# Patient Record
Sex: MALE | Race: WHITE | NOT HISPANIC OR LATINO
[De-identification: names, ages, dates, MRNs, and addresses within clinical notes are randomized per-mention and may not be internally consistent; named-entity substitution may affect disease eponyms.]

---

## 2016-11-08 LAB — GLUCOSE: 220

## 2017-01-11 ENCOUNTER — LAB SERVICES (OUTPATIENT)
Dept: OTHER | Age: 52
End: 2017-01-11

## 2017-01-12 ENCOUNTER — CHARTING TRANS (OUTPATIENT)
Dept: OTHER | Age: 52
End: 2017-01-12

## 2017-01-12 LAB
CREATININE RANDOM URINE: 102 MG/DL
HBA1C MFR BLD: 9.9 % (ref 4.5–5.6)
MICROALBUMIN UR-MCNC: 3.7 MG/DL
MICROALBUMIN/CREAT UR: 36.3 MCG/MG

## 2017-01-15 LAB
TESTOST FREE MFR SERPL: NORMAL
TESTOST FREE SERPL-MCNC: 9.77
TESTOST SERPL-MCNC: 444

## 2017-01-16 ENCOUNTER — APPOINTMENT (OUTPATIENT)
Dept: GENERAL RADIOLOGY | Age: 52
End: 2017-01-16
Attending: EMERGENCY MEDICINE
Payer: MEDICAID

## 2017-01-16 ENCOUNTER — HOSPITAL ENCOUNTER (OUTPATIENT)
Facility: HOSPITAL | Age: 52
Setting detail: OBSERVATION
Discharge: HOME | End: 2017-01-17
Attending: EMERGENCY MEDICINE | Admitting: HOSPITALIST
Payer: MEDICAID

## 2017-01-16 DIAGNOSIS — R73.9 HYPERGLYCEMIA: Primary | ICD-10-CM

## 2017-01-16 DIAGNOSIS — E10.10 TYPE 1 DIABETES MELLITUS WITH KETOACIDOSIS WITHOUT COMA (HCC): ICD-10-CM

## 2017-01-16 LAB
ALBUMIN SERPL-MCNC: 4.2 G/DL (ref 3.5–4.8)
ALP LIVER SERPL-CCNC: 76 U/L (ref 45–117)
ALT SERPL-CCNC: 70 U/L (ref 17–63)
AST SERPL-CCNC: 38 U/L (ref 15–41)
ATRIAL RATE: 113 BPM
BASOPHILS # BLD AUTO: 0.1 X10(3) UL (ref 0–0.1)
BASOPHILS NFR BLD AUTO: 1 %
BILIRUB SERPL-MCNC: 0.6 MG/DL (ref 0.1–2)
BUN BLD-MCNC: 17 MG/DL (ref 8–20)
CALCIUM BLD-MCNC: 9.8 MG/DL (ref 8.3–10.3)
CHLORIDE: 98 MMOL/L (ref 101–111)
CLARITY UR REFRACT.AUTO: CLEAR
CO2: 28 MMOL/L (ref 22–32)
COLOR UR AUTO: YELLOW
CREAT BLD-MCNC: 1.28 MG/DL (ref 0.7–1.3)
EOSINOPHIL # BLD AUTO: 0.05 X10(3) UL (ref 0–0.3)
EOSINOPHIL NFR BLD AUTO: 0.5 %
ERYTHROCYTE [DISTWIDTH] IN BLOOD BY AUTOMATED COUNT: 12.9 % (ref 11.5–16)
EST. AVERAGE GLUCOSE BLD GHB EST-MCNC: 232 MG/DL (ref 68–126)
GLUCOSE BLD-MCNC: 137 MG/DL (ref 65–99)
GLUCOSE BLD-MCNC: 151 MG/DL (ref 65–99)
GLUCOSE BLD-MCNC: 171 MG/DL (ref 65–99)
GLUCOSE BLD-MCNC: 227 MG/DL (ref 65–99)
GLUCOSE BLD-MCNC: 269 MG/DL (ref 65–99)
GLUCOSE BLD-MCNC: 321 MG/DL (ref 65–99)
GLUCOSE BLD-MCNC: 343 MG/DL (ref 70–99)
GLUCOSE UR STRIP.AUTO-MCNC: 500 MG/DL
HBA1C MFR BLD HPLC: 9.7 % (ref ?–5.7)
HCT VFR BLD AUTO: 46.1 % (ref 37–53)
HGB BLD-MCNC: 15.8 G/DL (ref 13–17)
IMMATURE GRANULOCYTE COUNT: 0.03 X10(3) UL (ref 0–1)
IMMATURE GRANULOCYTE RATIO %: 0.3 %
ISTAT BLOOD GAS BASE DEFICIT: -1 MMOL/L
ISTAT BLOOD GAS FIO2: 21 %
ISTAT BLOOD GAS HCO3: 24.2 MEQ/L (ref 22–26)
ISTAT BLOOD GAS O2 SATURATION: 68 % (ref 92–100)
ISTAT BLOOD GAS PCO2: 38 MMHG (ref 35–45)
ISTAT BLOOD GAS PH: 7.41 (ref 7.35–7.45)
ISTAT BLOOD GAS PO2: 35 MMHG (ref 80–105)
ISTAT BLOOD GAS TCO2: 25 MMOL/L (ref 22–32)
ISTAT PATIENT TEMPERATURE: 98.11 DEGREE
KETONES UR STRIP.AUTO-MCNC: 80 MG/DL
LEUKOCYTE ESTERASE UR QL STRIP.AUTO: NEGATIVE
LYMPHOCYTES # BLD AUTO: 1.95 X10(3) UL (ref 0.9–4)
LYMPHOCYTES NFR BLD AUTO: 19.5 %
M PROTEIN MFR SERPL ELPH: 8.1 G/DL (ref 6.1–8.3)
MCH RBC QN AUTO: 31.7 PG (ref 27–33.2)
MCHC RBC AUTO-ENTMCNC: 34.3 G/DL (ref 31–37)
MCV RBC AUTO: 92.4 FL (ref 80–99)
MONOCYTES # BLD AUTO: 0.75 X10(3) UL (ref 0.1–0.6)
MONOCYTES NFR BLD AUTO: 7.5 %
NEUTROPHIL ABS PRELIM: 7.12 X10 (3) UL (ref 1.3–6.7)
NEUTROPHILS # BLD AUTO: 7.12 X10(3) UL (ref 1.3–6.7)
NEUTROPHILS NFR BLD AUTO: 71.2 %
NITRITE UR QL STRIP.AUTO: NEGATIVE
P AXIS: 28 DEGREES
P-R INTERVAL: 138 MS
PH UR STRIP.AUTO: 5 [PH] (ref 4.5–8)
PLATELET # BLD AUTO: 190 10(3)UL (ref 150–450)
POTASSIUM SERPL-SCNC: 4 MMOL/L (ref 3.6–5.1)
PROT UR STRIP.AUTO-MCNC: NEGATIVE MG/DL
Q-T INTERVAL: 332 MS
QRS DURATION: 86 MS
QTC CALCULATION (BEZET): 455 MS
R AXIS: 56 DEGREES
RBC # BLD AUTO: 4.99 X10(6)UL (ref 4.3–5.7)
RBC UR QL AUTO: NEGATIVE
RED CELL DISTRIBUTION WIDTH-SD: 43.8 FL (ref 35.1–46.3)
SODIUM SERPL-SCNC: 135 MMOL/L (ref 136–144)
SP GR UR STRIP.AUTO: 1.01 (ref 1–1.03)
T AXIS: 32 DEGREES
TROPONIN: <0.046 NG/ML (ref ?–0.05)
UROBILINOGEN UR STRIP.AUTO-MCNC: 0.2 MG/DL
VENTRICULAR RATE: 113 BPM
WBC # BLD AUTO: 10 X10(3) UL (ref 4–13)

## 2017-01-16 PROCEDURE — 71020 XR CHEST PA + LAT CHEST (CPT=71020): CPT

## 2017-01-16 PROCEDURE — 99219 INITIAL OBSERVATION CARE,LEVL II: CPT | Performed by: INTERNAL MEDICINE

## 2017-01-16 RX ORDER — LISINOPRIL 20 MG/1
20 TABLET ORAL DAILY
Status: DISCONTINUED | OUTPATIENT
Start: 2017-01-16 | End: 2017-01-17

## 2017-01-16 RX ORDER — DEXTROSE MONOHYDRATE 25 G/50ML
50 INJECTION, SOLUTION INTRAVENOUS
Status: DISCONTINUED | OUTPATIENT
Start: 2017-01-16 | End: 2017-01-16

## 2017-01-16 RX ORDER — PANTOPRAZOLE SODIUM 20 MG/1
20 TABLET, DELAYED RELEASE ORAL
Status: DISCONTINUED | OUTPATIENT
Start: 2017-01-17 | End: 2017-01-17

## 2017-01-16 RX ORDER — POLYETHYLENE GLYCOL 3350 17 G/17G
17 POWDER, FOR SOLUTION ORAL DAILY PRN
Status: DISCONTINUED | OUTPATIENT
Start: 2017-01-16 | End: 2017-01-17

## 2017-01-16 RX ORDER — SODIUM CHLORIDE 450 MG/100ML
INJECTION, SOLUTION INTRAVENOUS CONTINUOUS
Status: DISCONTINUED | OUTPATIENT
Start: 2017-01-16 | End: 2017-01-17

## 2017-01-16 RX ORDER — ONDANSETRON 2 MG/ML
4 INJECTION INTRAMUSCULAR; INTRAVENOUS EVERY 6 HOURS PRN
Status: DISCONTINUED | OUTPATIENT
Start: 2017-01-16 | End: 2017-01-17

## 2017-01-16 RX ORDER — HYDROCODONE BITARTRATE AND ACETAMINOPHEN 10; 325 MG/1; MG/1
1 TABLET ORAL EVERY 4 HOURS PRN
Status: DISCONTINUED | OUTPATIENT
Start: 2017-01-16 | End: 2017-01-17

## 2017-01-16 RX ORDER — ATORVASTATIN CALCIUM 40 MG/1
40 TABLET, FILM COATED ORAL DAILY
Status: DISCONTINUED | OUTPATIENT
Start: 2017-01-16 | End: 2017-01-17

## 2017-01-16 RX ORDER — HYDROCODONE BITARTRATE AND ACETAMINOPHEN 5; 325 MG/1; MG/1
2 TABLET ORAL ONCE
Status: COMPLETED | OUTPATIENT
Start: 2017-01-16 | End: 2017-01-16

## 2017-01-16 RX ORDER — MIRTAZAPINE 15 MG/1
30 TABLET, FILM COATED ORAL NIGHTLY
Status: DISCONTINUED | OUTPATIENT
Start: 2017-01-16 | End: 2017-01-17

## 2017-01-16 RX ORDER — INSULIN ASPART 100 [IU]/ML
0.05 INJECTION, SOLUTION INTRAVENOUS; SUBCUTANEOUS ONCE
Status: DISCONTINUED | OUTPATIENT
Start: 2017-01-16 | End: 2017-01-16

## 2017-01-16 RX ORDER — DEXTROSE MONOHYDRATE 25 G/50ML
50 INJECTION, SOLUTION INTRAVENOUS
Status: DISCONTINUED | OUTPATIENT
Start: 2017-01-16 | End: 2017-01-17

## 2017-01-16 RX ORDER — ONDANSETRON 2 MG/ML
4 INJECTION INTRAMUSCULAR; INTRAVENOUS EVERY 4 HOURS PRN
Status: DISCONTINUED | OUTPATIENT
Start: 2017-01-16 | End: 2017-01-16 | Stop reason: ALTCHOICE

## 2017-01-16 RX ORDER — METOPROLOL TARTRATE 50 MG/1
50 TABLET, FILM COATED ORAL NIGHTLY
Status: DISCONTINUED | OUTPATIENT
Start: 2017-01-16 | End: 2017-01-17

## 2017-01-16 RX ORDER — BISACODYL 10 MG
10 SUPPOSITORY, RECTAL RECTAL
Status: DISCONTINUED | OUTPATIENT
Start: 2017-01-16 | End: 2017-01-17

## 2017-01-16 RX ORDER — FLUOXETINE HYDROCHLORIDE 20 MG/1
20 CAPSULE ORAL 2 TIMES DAILY
Status: DISCONTINUED | OUTPATIENT
Start: 2017-01-16 | End: 2017-01-17

## 2017-01-16 RX ORDER — INSULIN LISPRO 100 [IU]/ML
INJECTION, SOLUTION INTRAVENOUS; SUBCUTANEOUS
Status: ON HOLD | COMMUNITY
End: 2017-01-17

## 2017-01-16 RX ORDER — CLONAZEPAM 0.5 MG/1
0.5 TABLET ORAL 2 TIMES DAILY PRN
COMMUNITY

## 2017-01-16 RX ORDER — SODIUM CHLORIDE 9 MG/ML
INJECTION, SOLUTION INTRAVENOUS CONTINUOUS
Status: ACTIVE | OUTPATIENT
Start: 2017-01-16 | End: 2017-01-16

## 2017-01-16 RX ORDER — ACETAMINOPHEN 325 MG/1
650 TABLET ORAL EVERY 6 HOURS PRN
Status: DISCONTINUED | OUTPATIENT
Start: 2017-01-16 | End: 2017-01-17

## 2017-01-16 RX ORDER — CLONAZEPAM 0.5 MG/1
0.5 TABLET ORAL 2 TIMES DAILY PRN
Status: DISCONTINUED | OUTPATIENT
Start: 2017-01-16 | End: 2017-01-17

## 2017-01-16 RX ORDER — FOLIC ACID 1 MG/1
1 TABLET ORAL DAILY
Status: DISCONTINUED | OUTPATIENT
Start: 2017-01-16 | End: 2017-01-17

## 2017-01-16 RX ORDER — SODIUM PHOSPHATE, DIBASIC AND SODIUM PHOSPHATE, MONOBASIC 7; 19 G/133ML; G/133ML
1 ENEMA RECTAL ONCE AS NEEDED
Status: ACTIVE | OUTPATIENT
Start: 2017-01-16 | End: 2017-01-16

## 2017-01-16 RX ORDER — BUPROPION HYDROCHLORIDE 100 MG/1
200 TABLET, EXTENDED RELEASE ORAL 2 TIMES DAILY
Status: DISCONTINUED | OUTPATIENT
Start: 2017-01-16 | End: 2017-01-16

## 2017-01-16 RX ORDER — ENOXAPARIN SODIUM 100 MG/ML
40 INJECTION SUBCUTANEOUS DAILY
Status: DISCONTINUED | OUTPATIENT
Start: 2017-01-16 | End: 2017-01-17

## 2017-01-16 RX ORDER — GABAPENTIN 300 MG/1
300 CAPSULE ORAL 3 TIMES DAILY
Status: DISCONTINUED | OUTPATIENT
Start: 2017-01-16 | End: 2017-01-17

## 2017-01-16 NOTE — PLAN OF CARE
NURSING ADMISSION NOTE      Patient admitted via ED. Oriented to room. Safety precautions initiated. Bed in low position. Call light in reach.

## 2017-01-16 NOTE — ED PROVIDER NOTES
Patient Seen in: [facility] In [place]    History   Patient presents with:  Hyperglycemia (metabolic)    Stated Complaint: ketoacidosis per pt    HPI    This is a 28-year-old male who presents with complaints of hyperglycemia. .  The p Gastroesophageal Reflux Disease with Current Symptoms   cholecalciferol 11411 UNITS Oral Cap,  Take 1po qweek  Indications: Deficiency Disease   LISINOPRIL,  Take 20 mg by mouth daily.    Meloxicam (MOBIC) 15 MG Oral Tab,  Take 1 tablet (15 mg total) by gordy Clear to auscultation, there is no wheezing or retraction. No crackles. CV: Cardiovascular is regular without murmurs or rubs. ABD: The abdomen is soft nondistended nontender. There is no rebound. There is no guarding.     EXT: There is good pulses -----------         ------                     CBC W/ DIFFERENTIAL[[id number]]          Abnormal            Final result                 Please view results for these tests on the individual orders.    VENOUS BLOOD GAS   RAINBOW DRAW FREDY admitted. At this present time he has had no episodes of vomiting he is looks comfortable he is in no apparent respiratory distress. He is in no respiratory distress.     Disposition and Plan     Clinical Impression:  Hyperglycemia  (primary encounter ronit

## 2017-01-16 NOTE — PLAN OF CARE
Diabetes/Glucose Control    • Glucose maintained within prescribed range Progressing        Patient admitted from [place] ER  Patient alert and oriented x 3  Pleasant and cooperative  Noted to have flushed face stated ( DERMATITIS)  Blood sugar is 137

## 2017-01-16 NOTE — H&P
[facility] HOSPITALIST  History and Physical     Jerome Sibley Patient Status:  Observation    1965 MRN [id number]   [facility] 5NW-A Attending Jonnie Cedeno MD   Hosp Day # 0 PCP Chance Jameson     Chief Complaint: Hyperglycemia facility-administered medications on file prior to encounter. Current Outpatient Prescriptions on File Prior to Encounter:  MIRTAZAPINE OR Take 30 mg by mouth nightly. Disp:  Rfl:    gabapentin 300 MG Oral Cap Take 300 mg by mouth 3 (three) times daily. gallops. Equal pulses. Chest and Back: No tenderness or deformity. Abdomen: Soft, nontender, nondistended. Positive bowel sounds. No rebound, guarding or organomegaly. Neurologic: No focal neurological deficits. CNII-XII grossly intact.   Musculoskelet

## 2017-01-16 NOTE — ED NOTES
i-STAT Testing  Site: line draw  Time:11:35  Navjot's test result:NA  Clinical data: VBG  Results given to Dr Ness Post

## 2017-01-17 VITALS
BODY MASS INDEX: 25.67 KG/M2 | WEIGHT: 200 LBS | DIASTOLIC BLOOD PRESSURE: 68 MMHG | HEART RATE: 75 BPM | HEIGHT: 74 IN | TEMPERATURE: 98 F | OXYGEN SATURATION: 95 % | RESPIRATION RATE: 20 BRPM | SYSTOLIC BLOOD PRESSURE: 101 MMHG

## 2017-01-17 LAB
BASOPHILS # BLD AUTO: 0.1 X10(3) UL (ref 0–0.1)
BASOPHILS NFR BLD AUTO: 1.8 %
BUN BLD-MCNC: 12 MG/DL (ref 8–20)
CALCIUM BLD-MCNC: 8.5 MG/DL (ref 8.3–10.3)
CHLORIDE: 104 MMOL/L (ref 101–111)
CO2: 29 MMOL/L (ref 22–32)
CREAT BLD-MCNC: 0.92 MG/DL (ref 0.7–1.3)
EOSINOPHIL # BLD AUTO: 0.38 X10(3) UL (ref 0–0.3)
EOSINOPHIL NFR BLD AUTO: 6.8 %
ERYTHROCYTE [DISTWIDTH] IN BLOOD BY AUTOMATED COUNT: 12.6 % (ref 11.5–16)
GLUCOSE BLD-MCNC: 117 MG/DL (ref 65–99)
GLUCOSE BLD-MCNC: 207 MG/DL (ref 70–99)
GLUCOSE BLD-MCNC: 222 MG/DL (ref 65–99)
GLUCOSE BLD-MCNC: 243 MG/DL (ref 65–99)
GLUCOSE BLD-MCNC: 55 MG/DL (ref 65–99)
HCT VFR BLD AUTO: 39 % (ref 37–53)
HGB BLD-MCNC: 13.6 G/DL (ref 13–17)
IMMATURE GRANULOCYTE COUNT: 0.01 X10(3) UL (ref 0–1)
IMMATURE GRANULOCYTE RATIO %: 0.2 %
LYMPHOCYTES # BLD AUTO: 2.39 X10(3) UL (ref 0.9–4)
LYMPHOCYTES NFR BLD AUTO: 42.7 %
MCH RBC QN AUTO: 32.4 PG (ref 27–33.2)
MCHC RBC AUTO-ENTMCNC: 34.9 G/DL (ref 31–37)
MCV RBC AUTO: 92.9 FL (ref 80–99)
MONOCYTES # BLD AUTO: 0.5 X10(3) UL (ref 0.1–0.6)
MONOCYTES NFR BLD AUTO: 8.9 %
NEUTROPHIL ABS PRELIM: 2.22 X10 (3) UL (ref 1.3–6.7)
NEUTROPHILS # BLD AUTO: 2.22 X10(3) UL (ref 1.3–6.7)
NEUTROPHILS NFR BLD AUTO: 39.6 %
PLATELET # BLD AUTO: 139 10(3)UL (ref 150–450)
POTASSIUM SERPL-SCNC: 4.9 MMOL/L (ref 3.6–5.1)
RBC # BLD AUTO: 4.2 X10(6)UL (ref 4.3–5.7)
RED CELL DISTRIBUTION WIDTH-SD: 42.8 FL (ref 35.1–46.3)
SODIUM SERPL-SCNC: 138 MMOL/L (ref 136–144)
WBC # BLD AUTO: 5.6 X10(3) UL (ref 4–13)

## 2017-01-17 PROCEDURE — 99217 OBSERVATION CARE DISCHARGE: CPT | Performed by: INTERNAL MEDICINE

## 2017-01-17 RX ORDER — METOPROLOL SUCCINATE 50 MG/1
50 TABLET, EXTENDED RELEASE ORAL NIGHTLY
COMMUNITY
End: 2017-10-26

## 2017-01-17 RX ORDER — INSULIN LISPRO 100 [IU]/ML
INJECTION, SOLUTION INTRAVENOUS; SUBCUTANEOUS DAILY
Qty: 1 VIAL | Refills: 0 | Status: SHIPPED | COMMUNITY
Start: 2017-01-17 | End: 2018-02-14

## 2017-01-17 NOTE — CM/SW NOTE
Patient was screened during rounds and no needs are identified at this time. RN to contact SW/CM if needs arise. 01/17/17 1200   CM/SW Screening   Referral Source Social Work (self-referral);    [place] [facility] staff; Chart review

## 2017-01-17 NOTE — CONSULTS
[facility]  Diabetes Clinical Nurse Specialist Consult Note    Charline Rice Patient Status:  Observation    1965 MRN [id number]   [facility] 5NW-A Attending Nely Eason MD   Hosp Day # 1 PCP Radhika Ny     Reason for C 31.775 (54%)  Insulin to Carbohydrate Ratios:  1:7.6  Correction Insulin:  12A = 20; 12P=25; 6P=20  Total Daily Dose Bolus (14 day average):  26.625 (46%)  Target Goal(s):   Active Insulin:  4    [facility]    [facility]

## 2017-01-17 NOTE — PROGRESS NOTES
[facility]PITAL[facility]  Progress Note     Brenda Medicus Patient Status:  Observation    1965 MRN [id number]   [facility] 5NW-A Attending Gudelia Phan MD   Hosp Day # 1 PCP BRAULIO DIAMOND     Chief Complaint: hyperglycemia    S: Patie lisinopril  20 mg Oral Daily   • insulin aspart  1-68 Units Subcutaneous TID CC   • insulin aspart  2-10 Units Subcutaneous TID CC and HS   • insulin detemir  25 Units Subcutaneous Daily       ASSESSMENT / PLAN:     Hyperglycemia - appreciate endo recs, gi

## 2017-01-17 NOTE — PLAN OF CARE
Diabetes/Glucose Control    • Glucose maintained within prescribed range Progressing        Patient/Family Goals    • Patient/Family Long Term Goal Progressing    • Patient/Family Short Term Goal Progressing          PROBLEM:HYPERGLYCEMIA    ASSESSMENT:PT

## 2017-01-17 NOTE — PROGRESS NOTES
NURSING DISCHARGE NOTE    Discharged Home via Wheelchair. Accompanied by Support staff  Belongings Taken by patient/family. Patient discharged home with brother. PIV removed. Hypoglycemia resolved, ok to DC per endo.  All discharge medications, inst

## 2017-01-17 NOTE — PAYOR COMM NOTE
Attending Physician: Ro Shields MD    Review Type: ADMISSION   Reviewer: Patricia Harrell       Date: January 17, 2017 - 12:58 PM  Payor: [address] [place] [address]  Authorization Number: [id number]  Admit date: 1/16/2017 10:55 AM   Admitted from E Petra De La Rosa, RN      HYDROcodone-acetaminophen (NORCO)  MG per tab 1 tablet     Date Action Dose Route User    1/17/2017 1208 Given 1 tablet Oral Lyn Irons, RN    1/17/2017 0827 Given 1 tablet Oral Lyn Irons, RN    1/17/2017 0515 G (14) - Abnormal; Notable for the following:     Glucose 343 (*)     Alt 70 (*)     Sodium 135 (*)     Chloride 98 (*)     All other components within normal limits   URINALYSIS WITH CULTURE REFLEX - Abnormal; Notable for the following:     Glucose Urine 50 insulin pump, appreciate recs from Endo, most likely due to viral uri/illness GI upset                  Check q 1 hr accuchecks upon admission to monitor for hypoglycemia as he is dropping since er  2. DMI - per endo  3.  Viral illness - supportive tx

## 2017-01-17 NOTE — PROGRESS NOTES
[facility]  Progress Note    Robin Dacostath Patient Status:  Observation    1965 MRN [id number]   [facility] 5NW-A Attending Linda Jaramillo MD   Hosp Day # 1 PCP BRAULIO DIAMOND       SUBJECTIVE:  No acute events overnight.  01/17/2017   CO2 29.0 01/17/2017    01/17/2017   CA 8.5 01/17/2017   ALB 4.2 01/16/2017   ALKPHO 76 01/16/2017   BILT 0.6 01/16/2017   TP 8.1 01/16/2017   AST 38 01/16/2017   ALT 70 01/16/2017   TROP <0.046 01/16/2017   PGLU 243 01/17/2017 (PRINIVIL,ZESTRIL) tab 20 mg 20 mg Oral Daily   glucose (DEX4) oral liquid 15 g 15 g Oral Q15 Min PRN   Or      Glucose-Vitamin C (DEX-4) 4-0.006 G chewable tab 4 tablet 4 tablet Oral Q15 Min PRN   Or      dextrose injection 50 mL 50 mL Intravenous Q15 Min free to contact me with any questions or concerns.       Johanna Cosme MD  Endocrinology, Diabetes, and Metabolism  [facility]  1/17/2017  8:08 AM

## 2017-01-17 NOTE — DISCHARGE SUMMARY
[facility] HOSPITAL[facility]  DISCHARGE SUMMARY     Euell Asp Patient Status:  Observation    1965 MRN [id number]   [facility] 5NW-A Attending Monica Pradhan MD   [facility] Day # 1 PCP Sherif Sender     Date of Admission: 2017  Date of descriptions):  • none    Lab/Test results pending at Discharge:   · none    Consultants:  • Dr Erna Jeffers - endocrinology     Antibiotic: none   End date: none    Discharge Medication List:     Discharge Medications      CHANGE how you take these medications Meloxicam 15 MG Tabs   Commonly known as:  MOBIC        Take 1 tablet (15 mg total) by mouth daily.     Quantity:  30 tablet   Refills:  2       METOPROLOL TARTRATE OR   Last time this was given:  50 mg on 1/16/2017  8:30 PM        Take 50 mg by mouth night

## 2017-01-19 ENCOUNTER — CHARTING TRANS (OUTPATIENT)
Dept: OTHER | Age: 52
End: 2017-01-19

## 2017-01-20 LAB
ALBUMIN SERPL-MCNC: 4.2 G/DL (ref 3.6–5.1)
ALBUMIN/GLOB SERPL: 1.4 (ref 1–2.4)
ALP SERPL-CCNC: 81 UNITS/L (ref 45–117)
ALT SERPL-CCNC: 48 UNITS/L
ANION GAP SERPL CALC-SCNC: 14 MMOL/L (ref 10–20)
APPEARANCE UR: CLEAR
AST SERPL-CCNC: 36 UNITS/L
BACTERIA #/AREA URNS HPF: ABNORMAL /HPF
BASOPHILS # BLD: 0.1 K/MCL (ref 0–0.3)
BASOPHILS NFR BLD: 2 %
BILIRUB SERPL-MCNC: 0.4 MG/DL (ref 0.2–1)
BILIRUB UR QL: NEGATIVE
BUN SERPL-MCNC: 20 MG/DL (ref 10–20)
BUN/CREAT SERPL: 22 (ref 7–25)
CALCIUM SERPL-MCNC: 9.6 MG/DL (ref 8.4–10.2)
CHLORIDE SERPL-SCNC: 100 MMOL/L (ref 98–107)
CHOLEST SERPL-MCNC: 97 MG/DL (ref 100–200)
CHOLEST/HDLC SERPL: 2.9
CO2 SERPL-SCNC: 27 MMOL/L (ref 21–32)
COLOR UR: YELLOW
CREAT SERPL-MCNC: 0.89 MG/DL (ref 0.67–1.17)
DIFFERENTIAL METHOD BLD: ABNORMAL
EOSINOPHIL # BLD: 0.5 K/MCL (ref 0.1–0.5)
EOSINOPHIL NFR BLD: 8 %
ERYTHROCYTE [DISTWIDTH] IN BLOOD: 13.8 % (ref 11–15)
GLOBULIN SER-MCNC: 3.1 G/DL (ref 2–4)
GLUCOSE SERPL-MCNC: 285 MG/DL (ref 65–99)
GLUCOSE UR-MCNC: >500 MG/DL
HDLC SERPL-MCNC: 33 MG/DL
HEMATOCRIT: 45.7 % (ref 39–51)
HEMOGLOBIN: 15.2 G/DL (ref 13–17)
HYALINE CASTS #/AREA URNS LPF: ABNORMAL /LPF (ref 0–5)
KETONES UR-MCNC: NEGATIVE MG/DL
LDLC SERPL CALC-MCNC: 37 MG/DL
LENGTH OF FAST TIME PATIENT: 12 HRS
LENGTH OF FAST TIME PATIENT: 12 HRS
LYMPHOCYTES # BLD: 2.1 K/MCL (ref 1–4.8)
LYMPHOCYTES NFR BLD: 35 %
MEAN CORPUSCULAR HEMOGLOBIN: 31.7 PG (ref 26–34)
MEAN CORPUSCULAR HGB CONC: 33.3 G/DL (ref 32–36.5)
MEAN CORPUSCULAR VOLUME: 95.4 FL (ref 78–100)
MONOCYTES # BLD: 0.5 K/MCL (ref 0.3–0.9)
MONOCYTES NFR BLD: 9 %
NEUTROPHILS # BLD: 2.8 K/MCL (ref 1.8–7.7)
NEUTROPHILS NFR BLD: 46 %
NITRITE UR QL: NEGATIVE
NONHDLC SERPL-MCNC: 64 MG/DL
PH UR: 5 UNITS (ref 5–7)
PLATELET COUNT: 208 K/MCL (ref 140–450)
POTASSIUM SERPL-SCNC: 4.9 MMOL/L (ref 3.4–5.1)
PROT UR QL: NEGATIVE MG/DL
RBC #/AREA URNS HPF: ABNORMAL /HPF (ref 0–3)
RBC-URINE: NEGATIVE
RED CELL COUNT: 4.79 MIL/MCL (ref 4.5–5.9)
SODIUM SERPL-SCNC: 136 MMOL/L (ref 135–145)
SP GR UR: 1.02 (ref 1–1.03)
SPECIMEN SOURCE: ABNORMAL
SQUAMOUS #/AREA URNS HPF: ABNORMAL /HPF (ref 0–5)
TOTAL PROTEIN: 7.3 G/DL (ref 6.4–8.2)
TRIGL SERPL-MCNC: 136 MG/DL
TSH SERPL-ACNC: 1.47 MCUNITS/ML (ref 0.35–5)
UROBILINOGEN UR QL: 0.2 MG/DL (ref 0–1)
WBC #/AREA URNS HPF: ABNORMAL /HPF (ref 0–5)
WBC-URINE: NEGATIVE
WHITE BLOOD COUNT: 6 K/MCL (ref 4.2–11)

## 2017-01-21 ENCOUNTER — CHARTING TRANS (OUTPATIENT)
Dept: OTHER | Age: 52
End: 2017-01-21

## 2017-01-23 ENCOUNTER — LAB SERVICES (OUTPATIENT)
Dept: OTHER | Age: 52
End: 2017-01-23

## 2017-01-25 LAB
AMPHETAMINES UR QL SCN>500 NG/ML: NEGATIVE
BARBITURATES UR QL SCN>200 NG/ML: NEGATIVE
BENZODIAZ UR QL SCN>200 NG/ML: NEGATIVE
BZE UR QL SCN>150 NG/ML: NEGATIVE
CANNABINOIDS UR QL SCN>50 NG/ML: NEGATIVE
METHADONE UR QL SCN>300 NG/ML: NEGATIVE NG/ML
OPIATES UR QL SCN>300 NG/ML: POSITIVE
PCP UR QL SCN>25 NG/ML: NEGATIVE

## 2017-01-30 ENCOUNTER — CHARTING TRANS (OUTPATIENT)
Dept: OTHER | Age: 52
End: 2017-01-30

## 2017-02-01 ENCOUNTER — LAB SERVICES (OUTPATIENT)
Dept: OTHER | Age: 52
End: 2017-02-01

## 2017-02-01 ENCOUNTER — CHARTING TRANS (OUTPATIENT)
Dept: OTHER | Age: 52
End: 2017-02-01

## 2017-02-01 LAB — GLUCOSE: 309

## 2017-02-17 ENCOUNTER — IMAGING SERVICES (OUTPATIENT)
Dept: OTHER | Age: 52
End: 2017-02-17

## 2017-02-17 ENCOUNTER — HOSPITAL (OUTPATIENT)
Dept: OTHER | Age: 52
End: 2017-02-17

## 2017-02-17 ENCOUNTER — CHARTING TRANS (OUTPATIENT)
Dept: OTHER | Age: 52
End: 2017-02-17

## 2017-03-02 ENCOUNTER — CHARTING TRANS (OUTPATIENT)
Dept: OTHER | Age: 52
End: 2017-03-02

## 2017-03-06 ENCOUNTER — SURGERY (OUTPATIENT)
Age: 52
End: 2017-03-06

## 2017-03-06 ENCOUNTER — HOSPITAL ENCOUNTER (OUTPATIENT)
Facility: HOSPITAL | Age: 52
Setting detail: HOSPITAL OUTPATIENT SURGERY
Discharge: HOME | End: 2017-03-06
Attending: OPHTHALMOLOGY | Admitting: OPHTHALMOLOGY
Payer: MEDICAID

## 2017-03-06 VITALS
RESPIRATION RATE: 20 BRPM | TEMPERATURE: 98 F | SYSTOLIC BLOOD PRESSURE: 125 MMHG | HEART RATE: 60 BPM | DIASTOLIC BLOOD PRESSURE: 88 MMHG | BODY MASS INDEX: 26.31 KG/M2 | HEIGHT: 74 IN | OXYGEN SATURATION: 100 % | WEIGHT: 205 LBS

## 2017-03-06 LAB
GLUCOSE BLD-MCNC: 160 MG/DL (ref 65–99)
GLUCOSE BLD-MCNC: 258 MG/DL (ref 65–99)

## 2017-03-06 PROCEDURE — 82962 GLUCOSE BLOOD TEST: CPT

## 2017-03-06 PROCEDURE — 08RK3JZ REPLACEMENT OF LEFT LENS WITH SYNTHETIC SUBSTITUTE, PERCUTANEOUS APPROACH: ICD-10-PCS | Performed by: OPHTHALMOLOGY

## 2017-03-06 RX ORDER — TROPICAMIDE 10 MG/ML
SOLUTION/ DROPS OPHTHALMIC
Status: DISCONTINUED
Start: 2017-03-06 | End: 2017-03-06

## 2017-03-06 RX ORDER — FLURBIPROFEN SODIUM 0.3 MG/ML
1 SOLUTION/ DROPS OPHTHALMIC SEE ADMIN INSTRUCTIONS
Status: COMPLETED | OUTPATIENT
Start: 2017-03-06 | End: 2017-03-06

## 2017-03-06 RX ORDER — TETRACAINE HYDROCHLORIDE 5 MG/ML
1 SOLUTION OPHTHALMIC SEE ADMIN INSTRUCTIONS
Status: COMPLETED | OUTPATIENT
Start: 2017-03-06 | End: 2017-03-06

## 2017-03-06 RX ORDER — BALANCED SALT SOLUTION 6.4; .75; .48; .3; 3.9; 1.7 MG/ML; MG/ML; MG/ML; MG/ML; MG/ML; MG/ML
SOLUTION OPHTHALMIC AS NEEDED
Status: DISCONTINUED | OUTPATIENT
Start: 2017-03-06 | End: 2017-03-06 | Stop reason: HOSPADM

## 2017-03-06 RX ORDER — TROPICAMIDE 10 MG/ML
1 SOLUTION/ DROPS OPHTHALMIC SEE ADMIN INSTRUCTIONS
Status: COMPLETED | OUTPATIENT
Start: 2017-03-06 | End: 2017-03-06

## 2017-03-06 RX ORDER — MOXIFLOXACIN 5 MG/ML
SOLUTION/ DROPS OPHTHALMIC AS NEEDED
Status: DISCONTINUED | OUTPATIENT
Start: 2017-03-06 | End: 2017-03-06 | Stop reason: HOSPADM

## 2017-03-06 RX ORDER — LIDOCAINE HYDROCHLORIDE 10 MG/ML
INJECTION, SOLUTION EPIDURAL; INFILTRATION; INTRACAUDAL; PERINEURAL AS NEEDED
Status: DISCONTINUED | OUTPATIENT
Start: 2017-03-06 | End: 2017-03-06 | Stop reason: HOSPADM

## 2017-03-06 RX ORDER — TETRACAINE HYDROCHLORIDE 5 MG/ML
SOLUTION OPHTHALMIC
Status: DISCONTINUED
Start: 2017-03-06 | End: 2017-03-06

## 2017-03-06 RX ORDER — FLURBIPROFEN SODIUM 0.3 MG/ML
SOLUTION/ DROPS OPHTHALMIC
Status: DISCONTINUED
Start: 2017-03-06 | End: 2017-03-06

## 2017-03-06 RX ORDER — DIAZEPAM 5 MG/1
10 TABLET ORAL ONCE
Status: COMPLETED | OUTPATIENT
Start: 2017-03-06 | End: 2017-03-06

## 2017-03-06 RX ORDER — PREDNISOLONE ACETATE 10 MG/ML
SUSPENSION/ DROPS OPHTHALMIC AS NEEDED
Status: DISCONTINUED | OUTPATIENT
Start: 2017-03-06 | End: 2017-03-06 | Stop reason: HOSPADM

## 2017-03-06 RX ORDER — PHENYLEPHRINE HCL 2.5 %
1 DROPS OPHTHALMIC (EYE) SEE ADMIN INSTRUCTIONS
Status: COMPLETED | OUTPATIENT
Start: 2017-03-06 | End: 2017-03-06

## 2017-03-06 NOTE — OPERATIVE REPORT
[facility]    Post Procedure Staging Note    Vijay Antoine Patient Status:  Hospital Outpatient Surgery    1965 MRN [id number]   [facility] SURGERY Attending Kevin Dorantes MD   Hosp Day # 0 PCP Ricardo Milton       Procedure Da the I&A tip. The corneal wounds were hydrated, tested, and found to be nonleaking. Antibiotic and anti inflammatory eyedrops were given immediately post procedure.   The patient tolerated the procedure well and was taken to the recovery room in good condi

## 2017-03-16 ENCOUNTER — IMAGING SERVICES (OUTPATIENT)
Dept: OTHER | Age: 52
End: 2017-03-16

## 2017-03-16 ENCOUNTER — HOSPITAL (OUTPATIENT)
Dept: OTHER | Age: 52
End: 2017-03-16
Attending: INTERNAL MEDICINE

## 2017-03-16 ENCOUNTER — LAB SERVICES (OUTPATIENT)
Dept: OTHER | Age: 52
End: 2017-03-16

## 2017-03-16 ENCOUNTER — CHARTING TRANS (OUTPATIENT)
Dept: OTHER | Age: 52
End: 2017-03-16

## 2017-03-16 LAB — GLUCOSE: 247

## 2017-03-29 ENCOUNTER — IMAGING SERVICES (OUTPATIENT)
Dept: OTHER | Age: 52
End: 2017-03-29

## 2017-03-29 ENCOUNTER — HOSPITAL (OUTPATIENT)
Dept: OTHER | Age: 52
End: 2017-03-29
Attending: PODIATRIST

## 2017-04-04 ENCOUNTER — CHARTING TRANS (OUTPATIENT)
Dept: OTHER | Age: 52
End: 2017-04-04

## 2017-04-14 ENCOUNTER — OFFICE VISIT (OUTPATIENT)
Dept: SURGERY | Facility: CLINIC | Age: 52
End: 2017-04-14

## 2017-04-14 VITALS
SYSTOLIC BLOOD PRESSURE: 118 MMHG | DIASTOLIC BLOOD PRESSURE: 76 MMHG | RESPIRATION RATE: 16 BRPM | WEIGHT: 205 LBS | HEART RATE: 84 BPM | HEIGHT: 74 IN | BODY MASS INDEX: 26.31 KG/M2

## 2017-04-14 DIAGNOSIS — M54.14 THORACIC RADICULOPATHY: Primary | ICD-10-CM

## 2017-04-14 DIAGNOSIS — Z98.1 HISTORY OF LUMBAR FUSION: ICD-10-CM

## 2017-04-14 DIAGNOSIS — M48.02 NEURAL FORAMINAL STENOSIS OF CERVICAL SPINE: ICD-10-CM

## 2017-04-14 DIAGNOSIS — M54.12 CERVICAL RADICULOPATHY: ICD-10-CM

## 2017-04-14 DIAGNOSIS — Z98.890 H/O CERVICAL SPINE SURGERY: ICD-10-CM

## 2017-04-14 PROCEDURE — 99204 OFFICE O/P NEW MOD 45 MIN: CPT | Performed by: NURSE PRACTITIONER

## 2017-04-14 RX ORDER — GABAPENTIN 600 MG/1
600 TABLET ORAL 3 TIMES DAILY
COMMUNITY
End: 2017-06-26 | Stop reason: DRUGHIGH

## 2017-04-14 NOTE — PROGRESS NOTES
Location of Pain: Mid thoracic pain, radiates around left side of ribcage.   Also having left shoulder pain    Date Pain Began: Chronic but getting worse          Work Related:   No        Receiving Work Comp/Disability:   No    Numeric Rating Scale:  Pain

## 2017-04-14 NOTE — PATIENT INSTRUCTIONS
Refill policies:    • Allow 2 business days for refills; controlled substances may take longer.   • Contact your pharmacy at least 5 days prior to running out of medication and have them send an electronic request or submit request through the “request re insurance carrier to obtain pre-certification or prior authorization. Unfortunately, [facility] has seen an increase in denial of payment even though the procedure/test has been pre-certified.   You are strongly encouraged to contact your insurance carrier to v

## 2017-04-14 NOTE — H&P
NEUROSURGERY CLINIC VISIT    Leola Castleman  11/16/1965  * No surgery found *    Patient presents with:  Low Back Pain: mid back and left shoulder pain        HPI:   Th 10.  His PCP prescribes narcotics. [place]  he has been getting #180 Norco 10/325 per month. History of EtOH abuse. Review of records he has seen Dr. Shanda Paez See for EtOH abuse treatment program and treated with Suboxone.   He is trying to get an vincent 3 (three) times daily. Disp:  Rfl:    HYDROcodone-acetaminophen  MG Oral Tab Take 1 tablet by mouth every 4 (four) hours as needed for Pain. Disp:  Rfl:    ATORVASTATIN CALCIUM OR Take 40 mg by mouth daily.    Disp:  Rfl:    folic acid (FOLVITE) 1 M 5  5  5  5  5  5   Left          5        5         5          5 5  5 5  5 5      Lower extremity strength:       Iliopsoas   Hamstrings    Quads     D-flexion  P-flexion    Right        5          5        5          5  5    Left        5          5

## 2017-04-17 ENCOUNTER — TELEPHONE (OUTPATIENT)
Dept: SURGERY | Facility: CLINIC | Age: 52
End: 2017-04-17

## 2017-04-17 NOTE — TELEPHONE ENCOUNTER
Started PA [facility] for MRI Spine Thoracic (ct code [id number]), MRI Spine Cervical (cpt code 70239)  Called insurance. Spoke to rep KEVIN. PA approved  Auth# [id number]  Valid from 04/17/17 to 06/01/17  Per rep will still need clinicals faxed in.  Faxed clin

## 2017-04-24 ENCOUNTER — HOSPITAL ENCOUNTER (OUTPATIENT)
Dept: MRI IMAGING | Age: 52
Discharge: HOME | End: 2017-04-24
Attending: NURSE PRACTITIONER
Payer: MEDICAID

## 2017-04-24 DIAGNOSIS — M54.14 THORACIC RADICULOPATHY: ICD-10-CM

## 2017-04-24 DIAGNOSIS — M48.02 NEURAL FORAMINAL STENOSIS OF CERVICAL SPINE: ICD-10-CM

## 2017-04-24 DIAGNOSIS — M54.12 CERVICAL RADICULOPATHY: ICD-10-CM

## 2017-04-24 DIAGNOSIS — Z98.890 H/O CERVICAL SPINE SURGERY: ICD-10-CM

## 2017-04-24 DIAGNOSIS — Z98.1 HISTORY OF LUMBAR FUSION: ICD-10-CM

## 2017-04-24 PROCEDURE — 72146 MRI CHEST SPINE W/O DYE: CPT

## 2017-04-24 PROCEDURE — A9575 INJ GADOTERATE MEGLUMI 0.1ML: HCPCS | Performed by: NURSE PRACTITIONER

## 2017-04-24 PROCEDURE — 72156 MRI NECK SPINE W/O & W/DYE: CPT

## 2017-04-25 ENCOUNTER — CHARTING TRANS (OUTPATIENT)
Dept: OTHER | Age: 52
End: 2017-04-25

## 2017-04-25 ASSESSMENT — PAIN SCALES - GENERAL: PAINLEVEL_OUTOF10: 8

## 2017-04-26 ENCOUNTER — OFFICE VISIT (OUTPATIENT)
Dept: SURGERY | Facility: CLINIC | Age: 52
End: 2017-04-26

## 2017-04-26 VITALS
SYSTOLIC BLOOD PRESSURE: 110 MMHG | BODY MASS INDEX: 26.44 KG/M2 | HEIGHT: 74 IN | RESPIRATION RATE: 15 BRPM | HEART RATE: 88 BPM | DIASTOLIC BLOOD PRESSURE: 70 MMHG | WEIGHT: 206 LBS

## 2017-04-26 DIAGNOSIS — M50.30 DDD (DEGENERATIVE DISC DISEASE), CERVICAL: ICD-10-CM

## 2017-04-26 DIAGNOSIS — M48.02 NEURAL FORAMINAL STENOSIS OF CERVICAL SPINE: ICD-10-CM

## 2017-04-26 DIAGNOSIS — Z98.890 HISTORY OF CERVICAL SPINAL SURGERY: Primary | ICD-10-CM

## 2017-04-26 DIAGNOSIS — M54.12 CERVICAL RADICULOPATHY: ICD-10-CM

## 2017-04-26 PROCEDURE — 99214 OFFICE O/P EST MOD 30 MIN: CPT | Performed by: NURSE PRACTITIONER

## 2017-04-26 NOTE — PROGRESS NOTES
NEUROSURGERY CLINIC VISIT    Leola Castleman  11/16/1965  * No surgery found *    Patient presents with:  Test Results: follow up after mri        HPI:   Leola Castleman elbow... Denies left arm weakness. he is right handed. States left lateral rib pain comes and goes and does not radiate anterior or posterior.  Pain is stabbing, constant, ache. This has been present for years and started after lumbar surgery.  Denies right CERVICAL SURGERY    CATARACT  10/30/16    Comment right eye    SPINE SURGERY PROCEDURE UNLISTED  8/2016    Comment Posterior cervical laminectomy, Dr. Griffiths Friday        Current Outpatient Prescriptions:  Insulin Lispro 100 UNIT/ML Subcutaneous Solution Inject mmHg  Pulse 88  Resp 15  Ht 74\"  Wt 206 lb  BMI 26.44 kg/m2  GENERAL:  Patient is in no acute distress. HEENT:  Normocephalic, atraumatic  SKIN: Warm, dry, no rashes or scars.   RESPIRATORY: easy and even  NEUROLOGICAL:  This patient is alert and orientat detail. The diagnosis, treatment options, and expectations were discussed. Bilateral neural foraminal stenosis likely generator of his left arm pain but will obtain EMG to determine if this is chronic or acute radiculopathy or cubital tunnel.  Surgical opt

## 2017-05-12 ENCOUNTER — OFFICE VISIT (OUTPATIENT)
Dept: NEUROLOGY | Facility: CLINIC | Age: 52
End: 2017-05-12

## 2017-05-12 DIAGNOSIS — M54.12 CERVICAL RADICULOPATHY: Primary | ICD-10-CM

## 2017-05-12 PROCEDURE — 95910 NRV CNDJ TEST 7-8 STUDIES: CPT | Performed by: OTHER

## 2017-05-12 PROCEDURE — 95886 MUSC TEST DONE W/N TEST COMP: CPT | Performed by: OTHER

## 2017-05-12 NOTE — PROCEDURES
Date of service: 5/12/2017    Procedure: Nerve Conduction Study and Electromyography - complete EMG study in bilateral upper extremities. History: Electrodiagnostic testing on this 49year old male was performed in bilateral upper extremities.  The patie IA Fib PSW Fasc H.F. Amp Dur. PPP Pattern   L.  DELTOID N None None None None N N N N   R. DELTOID N None None None None N N N N   L. BICEPS N None None None None N N N N   R. BICEPS N None None None None N N N N   L. TRICEPS N None None None None N N N N

## 2017-05-22 ENCOUNTER — OFFICE VISIT (OUTPATIENT)
Dept: SURGERY | Facility: CLINIC | Age: 52
End: 2017-05-22

## 2017-05-22 ENCOUNTER — LAB SERVICES (OUTPATIENT)
Dept: OTHER | Age: 52
End: 2017-05-22

## 2017-05-22 VITALS — DIASTOLIC BLOOD PRESSURE: 60 MMHG | SYSTOLIC BLOOD PRESSURE: 130 MMHG | HEART RATE: 100 BPM | RESPIRATION RATE: 16 BRPM

## 2017-05-22 DIAGNOSIS — G56.03 CARPAL TUNNEL SYNDROME ON BOTH SIDES: ICD-10-CM

## 2017-05-22 DIAGNOSIS — G56.23 ULNAR NEUROPATHY OF BOTH UPPER EXTREMITIES: ICD-10-CM

## 2017-05-22 DIAGNOSIS — M50.30 DDD (DEGENERATIVE DISC DISEASE), CERVICAL: Primary | ICD-10-CM

## 2017-05-22 PROCEDURE — 99214 OFFICE O/P EST MOD 30 MIN: CPT | Performed by: NEUROLOGICAL SURGERY

## 2017-05-22 RX ORDER — CYCLOBENZAPRINE HCL 10 MG
1 TABLET ORAL NIGHTLY PRN
Refills: 1 | COMMUNITY
Start: 2017-05-17 | End: 2018-01-02

## 2017-05-22 RX ORDER — LISINOPRIL 20 MG/1
20 TABLET ORAL NIGHTLY
Refills: 3 | Status: ON HOLD | COMMUNITY
Start: 2017-04-28 | End: 2017-10-25

## 2017-05-22 RX ORDER — FLUOXETINE HYDROCHLORIDE 40 MG/1
80 CAPSULE ORAL NIGHTLY
Refills: 1 | COMMUNITY
Start: 2017-05-03 | End: 2018-09-14

## 2017-05-22 RX ORDER — MIRTAZAPINE 45 MG/1
45 TABLET, FILM COATED ORAL NIGHTLY
Refills: 1 | COMMUNITY
Start: 2017-05-22

## 2017-05-22 NOTE — PROGRESS NOTES
Pt is here to review emg results also states pain currently 9/10 in neck, shoulder and left arm. Pt states he has already taken 6 norco today.

## 2017-05-24 ENCOUNTER — TELEPHONE (OUTPATIENT)
Dept: SURGERY | Facility: CLINIC | Age: 52
End: 2017-05-24

## 2017-05-24 NOTE — TELEPHONE ENCOUNTER
Pt states he is going to [facility]) the 31st, and wanted to see if Dr. Neli Gil could prescribe [facility] 10 for him since he will be running out of this medication.   Our records do not show Dr. Neli Gil prescribing medication, asked pt who is prescribing, pt stated PCP

## 2017-05-29 ENCOUNTER — HOSPITAL ENCOUNTER (EMERGENCY)
Age: 52
Discharge: HOME | End: 2017-05-29
Attending: EMERGENCY MEDICINE
Payer: MEDICAID

## 2017-05-29 ENCOUNTER — APPOINTMENT (OUTPATIENT)
Dept: GENERAL RADIOLOGY | Age: 52
End: 2017-05-29
Attending: EMERGENCY MEDICINE
Payer: MEDICAID

## 2017-05-29 VITALS
SYSTOLIC BLOOD PRESSURE: 123 MMHG | HEART RATE: 98 BPM | DIASTOLIC BLOOD PRESSURE: 78 MMHG | WEIGHT: 205 LBS | RESPIRATION RATE: 18 BRPM | BODY MASS INDEX: 26.31 KG/M2 | TEMPERATURE: 98 F | OXYGEN SATURATION: 98 % | HEIGHT: 74 IN

## 2017-05-29 DIAGNOSIS — E86.0 DEHYDRATION: ICD-10-CM

## 2017-05-29 DIAGNOSIS — R73.9 HYPERGLYCEMIA: ICD-10-CM

## 2017-05-29 DIAGNOSIS — E13.9 DIABETES 1.5, MANAGED AS TYPE 1 (HCC): Primary | ICD-10-CM

## 2017-05-29 PROCEDURE — 82803 BLOOD GASES ANY COMBINATION: CPT

## 2017-05-29 PROCEDURE — 99285 EMERGENCY DEPT VISIT HI MDM: CPT

## 2017-05-29 PROCEDURE — 96374 THER/PROPH/DIAG INJ IV PUSH: CPT

## 2017-05-29 PROCEDURE — 80053 COMPREHEN METABOLIC PANEL: CPT | Performed by: EMERGENCY MEDICINE

## 2017-05-29 PROCEDURE — 82962 GLUCOSE BLOOD TEST: CPT

## 2017-05-29 PROCEDURE — 85025 COMPLETE CBC W/AUTO DIFF WBC: CPT | Performed by: EMERGENCY MEDICINE

## 2017-05-29 PROCEDURE — 93005 ELECTROCARDIOGRAM TRACING: CPT

## 2017-05-29 PROCEDURE — 96361 HYDRATE IV INFUSION ADD-ON: CPT

## 2017-05-29 PROCEDURE — 82009 KETONE BODYS QUAL: CPT | Performed by: EMERGENCY MEDICINE

## 2017-05-29 PROCEDURE — 81003 URINALYSIS AUTO W/O SCOPE: CPT | Performed by: EMERGENCY MEDICINE

## 2017-05-29 PROCEDURE — 71010 XR CHEST AP PORTABLE  (CPT=71010): CPT | Performed by: EMERGENCY MEDICINE

## 2017-05-29 PROCEDURE — 93010 ELECTROCARDIOGRAM REPORT: CPT

## 2017-05-29 RX ORDER — KETOROLAC TROMETHAMINE 30 MG/ML
30 INJECTION, SOLUTION INTRAMUSCULAR; INTRAVENOUS ONCE
Status: COMPLETED | OUTPATIENT
Start: 2017-05-29 | End: 2017-05-29

## 2017-05-29 NOTE — ED PROVIDER NOTES
Patient Seen in: THE [facility] Emergency Department In [place]    History   Patient presents with:  Hyperglycemia (metabolic)    Stated Complaint: elevated BS    HPI    79-year-old male comes the hospital with complaint of having difficulties with having chuy Solution,  Inject 1-68 Units into the skin daily. For insulin pump    [employer]  Basal rate: 8 units continuous  IC - 1:15  CF -  1:50    Metoprolol Succinate ER 50 MG Oral Tablet 24 Hr,  Take 50 mg by mouth nightly. ClonazePAM 0.5 MG Oral Tab,  Take 0. tachycardia  Lungs: Clear to auscultation bilaterally  Abdomen: Soft nontender nondistended normal active bowel sounds without rebound, guarding or masses noted  Back nontender without CVA tenderness  Extremity no clubbing, cyanosis or edema noted.   Full r CBC W/ DIFFERENTIAL[[id number]]          Abnormal            Final result                 Please view results for these tests on the individual orders.    SCAN SLIDE   VENOUS BLOOD GAS   RAINBOW DRAW BLUE   RAINBOW DRAW GOLD      EKG    Rate, in

## 2017-06-13 ENCOUNTER — OFFICE VISIT (OUTPATIENT)
Dept: PHYSICAL THERAPY | Age: 52
End: 2017-06-13
Attending: NEUROLOGICAL SURGERY
Payer: MEDICAID

## 2017-06-13 DIAGNOSIS — M50.30 DDD (DEGENERATIVE DISC DISEASE), CERVICAL: Primary | ICD-10-CM

## 2017-06-13 PROCEDURE — 97163 PT EVAL HIGH COMPLEX 45 MIN: CPT | Performed by: PHYSICAL THERAPIST

## 2017-06-13 PROCEDURE — 97530 THERAPEUTIC ACTIVITIES: CPT | Performed by: PHYSICAL THERAPIST

## 2017-06-13 NOTE — PROGRESS NOTES
SPINE EVALUATION:   Referring Physician: Dr. Shadi Costa  Diagnosis: DDD, cervical     Date of Service: 6/13/2017     PATIENT SUMMARY   Leola Castleman is a 49year old y/o male who presents to therapy today with complaints of cervical pain that radiates to Marsha Holbrook would benefit from skilled Physical Therapy to address the above impairments to return to previous level of function with minimal pain and restrictions.  Pt will also benefit from pain neuroscience education for return to maximal functioning with min Total Timed Treatment: 45 min     Total Treatment Time: 45 min     PLAN OF CARE:    Goals:    Pt will improve cervical AROM flexion by 30 degrees to improve tolerance for looking down to tie shoes (12 visits)  Pt will improve cervical AROM extension by 25 care.    X___________________________________________________ Date____________________    Certification From: 9/76/0846  To:9/11/2017

## 2017-06-15 ENCOUNTER — OFFICE VISIT (OUTPATIENT)
Dept: PHYSICAL THERAPY | Age: 52
End: 2017-06-15
Attending: NEUROLOGICAL SURGERY
Payer: MEDICAID

## 2017-06-15 PROCEDURE — 97110 THERAPEUTIC EXERCISES: CPT

## 2017-06-15 NOTE — PROGRESS NOTES
Dx: DDD, cervical         Authorized # of Visits:          Next MD visit: none scheduled  Fall Risk: standard         Precautions: n/a             Subjective: Pt reports feeling hopeful about outcomes with therapy.     Objective: Pt completed Revised Neurop Tx#: 4/ Date: Tx#: 5/ Date: Tx#: 6/ Date: Tx#: 7/ Date:    Tx#: 8/   Pulleys flex X 3 min         Seated upper trap stretch 30 sec X 3         Seated levator scapulae stretch w/o hand behind back 30 sec X 3         Doorway stretch 30 sec X 3

## 2017-06-19 ENCOUNTER — OFFICE VISIT (OUTPATIENT)
Dept: SURGERY | Facility: CLINIC | Age: 52
End: 2017-06-19

## 2017-06-19 VITALS — HEART RATE: 100 BPM | RESPIRATION RATE: 17 BRPM | SYSTOLIC BLOOD PRESSURE: 140 MMHG | DIASTOLIC BLOOD PRESSURE: 90 MMHG

## 2017-06-19 DIAGNOSIS — M50.30 DDD (DEGENERATIVE DISC DISEASE), CERVICAL: Primary | ICD-10-CM

## 2017-06-19 DIAGNOSIS — Z98.890 HISTORY OF CERVICAL SPINAL SURGERY: ICD-10-CM

## 2017-06-19 DIAGNOSIS — M54.12 CERVICAL RADICULOPATHY: ICD-10-CM

## 2017-06-19 DIAGNOSIS — M48.02 NEURAL FORAMINAL STENOSIS OF CERVICAL SPINE: ICD-10-CM

## 2017-06-19 PROCEDURE — 99213 OFFICE O/P EST LOW 20 MIN: CPT | Performed by: NURSE PRACTITIONER

## 2017-06-19 NOTE — PROGRESS NOTES
Neurosurgery Cervical  Follow up      [facility] PCP:  Dorys MURPHY 1965 MRN [id number]         Patient presents with:  Neck Pain: follow up after PT    REASON FOR VISIT: f/u after PT and EMG    HISTORY OF PRESENT ILLNESS:Thomas Nichols with surgery.  He no longer angela w/ Dr Ok Mcclendon as there were no additional surgical recommendations. Dr Diana Licea did L5-S1 fusion 2006.  He was referred to our office by Dr. Maryan Simental.  He has seen orthopedics for her shoulder pain in PT was rec Warm, dry, no rashes. Clubbing to fingertips. RESPIRATORY: easy and even  NEUROLOGICAL:  This patient is alert and orientated x 3. Speech fluent. Comprehension intact. Face is symmetrical. . SPINE: MOORE spontaneously.  Gait stable      DIAGNOSTIC DATA: More than 50% spent coordinating care and counseling. 6/19/2017; Dr Sandra Paz examined and reviewed   Argueta Grady.  [facility], APN  [employer]  [address] [facility] [address] [facility], [address] [facility] [address], [address]  [phone]  6/19/2017    I have seen and exam

## 2017-06-19 NOTE — PATIENT INSTRUCTIONS
Refill policies:    • Allow 2-3 business days for refills; controlled substances may take longer.   • Contact your pharmacy at least 5 days prior to running out of medication and have them send an electronic request or submit request through the [facility] have a procedure or additional testing performed. [employer] [facility]) will contact your insurance carrier to obtain pre-certification or prior authorization.     Unfortunately, [facility] has seen an increase in denial of payment even though the p

## 2017-06-20 ENCOUNTER — APPOINTMENT (OUTPATIENT)
Dept: PHYSICAL THERAPY | Age: 52
End: 2017-06-20
Attending: NEUROLOGICAL SURGERY
Payer: MEDICAID

## 2017-06-22 ENCOUNTER — OFFICE VISIT (OUTPATIENT)
Dept: PHYSICAL THERAPY | Age: 52
End: 2017-06-22
Attending: NEUROLOGICAL SURGERY
Payer: MEDICAID

## 2017-06-22 PROCEDURE — 97110 THERAPEUTIC EXERCISES: CPT

## 2017-06-22 NOTE — PROGRESS NOTES
Dx: DDD, cervical         Authorized # of Visits:          Next MD visit: none scheduled  Fall Risk: standard         Precautions: n/a             Subjective: Pt reports he could not attend last session due to increased glucose levels.  Pt reports pain at b Date:   Tx#: 4/ Date: Tx#: 5/ Date: Tx#: 6/ Date: Tx#: 7/ Date:    Tx#: 8/   Pulleys flex X 3 min Pulleys flex/abd X 6 min        Seated upper trap stretch 30 sec X 3 Seated upper trap stretch 30 sec X 3        Seated levator scapulae stretch w/o hand

## 2017-06-23 ENCOUNTER — OFFICE VISIT (OUTPATIENT)
Dept: SURGERY | Facility: CLINIC | Age: 52
End: 2017-06-23

## 2017-06-23 VITALS
DIASTOLIC BLOOD PRESSURE: 80 MMHG | HEIGHT: 74 IN | RESPIRATION RATE: 16 BRPM | WEIGHT: 200 LBS | SYSTOLIC BLOOD PRESSURE: 136 MMHG | OXYGEN SATURATION: 99 % | HEART RATE: 92 BPM | BODY MASS INDEX: 25.67 KG/M2

## 2017-06-23 DIAGNOSIS — M47.812 FACET ARTHROPATHY, CERVICAL: Primary | ICD-10-CM

## 2017-06-23 DIAGNOSIS — M47.814 FACET ARTHROPATHY, THORACIC: ICD-10-CM

## 2017-06-23 DIAGNOSIS — M96.1 FAILED BACK SURGICAL SYNDROME: ICD-10-CM

## 2017-06-23 PROCEDURE — 99215 OFFICE O/P EST HI 40 MIN: CPT | Performed by: PHYSICIAN ASSISTANT

## 2017-06-23 NOTE — PATIENT INSTRUCTIONS
Refill policies:    • Allow 2-3 business days for refills; controlled substances may take longer.   • Contact your pharmacy at least 5 days prior to running out of medication and have them send an electronic request or submit request through the [facility] have a procedure or additional testing performed. [facility]) will contact your insurance carrier to obtain pre-certification or prior authorization.     Unfortunately, [facility] has seen an increase in denial of payment even though the p

## 2017-06-23 NOTE — H&P
Name: More Enciso   : 1965   DOS: 2017     Chief complaint: Patient presents with:  Consult: neck and back pain      History of present illness:  More Enciso is a 49year old male sent to our service for injections by Dr Miguel Angel Jackson.  Pt h Pt complaining of 1. Thoracic back pain, 2. Cervical neck pain and pain going down left shoulder lateral arm and forearm with tingling in the left fingers. He has midline low back pain with left lateral thigh and knee pain.   he has had thoracic back pain • PTSD (post-traumatic stress disorder)         Current Outpatient Prescriptions:  CUSTOM MEDICATION CMPD Flurbiprofen 3.5%, Cyclobenzaprine 0.5%, Gabapentin 1%, Lidocaine 2.25%, Prilocaine 2.25%  Apply 4 grams to affected area three to four times daily fo Comment Posterior cervical laminectomy, Dr. Rony Sanderson      Family History   Problem Relation Age of Onset   • Diabetes Father    • Heart Disorder Father    • Hypertension Mother        Smoking Status: Current Every Day Smoker        Packs/Day: 0.50  Years: 2 Motor Examination:    (R)   (L)  Deltoid:      5    5  Biceps:      5    5  Triceps:      5    5  Wrist Extension:     5    5  Wrist Flexsion:     5    5  Finger Extension:     5    5  Finger Flexsion:     5    5    Deep Tendon Reflexes:            Biceps: left-sided laminotomy noted as well as mild enhancing granulation tissue versus fibrosis extending along the surgical tract into the left neural foramina, concerning for mild perineural fibrosis.   C7-T1:  No significant disc/facet abnormality, spinal steno BONY STRUCTURES:  No acute osseous abnormalities are noted. No compression deformities. No lytic, blastic or destructive osseous changes. Mild multilevel Schmorl's node changes relating to disc disease.   DISCS:  Mild to moderate multilevel degenerative dis No orders of the defined types were placed in this encounter. Return if symptoms worsen or fail to improve. Thank you for allowing me to assist in your patient's care.    Jason Larios PA-C  Pain Management [facility]

## 2017-06-25 PROBLEM — M50.30 DDD (DEGENERATIVE DISC DISEASE), CERVICAL: Status: ACTIVE | Noted: 2017-06-25

## 2017-06-25 PROBLEM — G56.23 ULNAR NEUROPATHY OF BOTH UPPER EXTREMITIES: Status: ACTIVE | Noted: 2017-06-25

## 2017-06-25 PROBLEM — G56.03 CARPAL TUNNEL SYNDROME ON BOTH SIDES: Status: ACTIVE | Noted: 2017-06-25

## 2017-06-25 NOTE — PROGRESS NOTES
[facility]   Outpatient Neurological Surgery Follow Up    More Fernie  : 1965  PCP: Wendy Jacob  Referring Provider: No ref.  provider found    REASON FOR VISIT:Patient presents with:  Test Results: follow reflexes bilateral lower extremities. There is no Zelaya's clonus or Babinski on examination. DIAGNOSTIC DATA:  Impression:  1.  There is electrophysiologic evidence of bilateral median nerve   entrapment neuropathy (as in Carpal Tunnel Syndrome) and

## 2017-06-26 ENCOUNTER — TELEPHONE (OUTPATIENT)
Dept: SURGERY | Facility: CLINIC | Age: 52
End: 2017-06-26

## 2017-06-26 ENCOUNTER — OFFICE VISIT (OUTPATIENT)
Dept: SURGERY | Facility: CLINIC | Age: 52
End: 2017-06-26

## 2017-06-26 VITALS — SYSTOLIC BLOOD PRESSURE: 100 MMHG | DIASTOLIC BLOOD PRESSURE: 70 MMHG | HEART RATE: 78 BPM

## 2017-06-26 DIAGNOSIS — Z98.1 HISTORY OF LUMBAR FUSION: ICD-10-CM

## 2017-06-26 DIAGNOSIS — M50.30 DDD (DEGENERATIVE DISC DISEASE), CERVICAL: ICD-10-CM

## 2017-06-26 DIAGNOSIS — M54.12 CERVICAL RADICULOPATHY: Primary | ICD-10-CM

## 2017-06-26 PROCEDURE — 99213 OFFICE O/P EST LOW 20 MIN: CPT | Performed by: NURSE PRACTITIONER

## 2017-06-26 NOTE — PROGRESS NOTES
Neurosurgery Cervical  Follow up      Leola Castleman PCP:  Andi MURPHY 1965 MRN [id number]         Patient presents with:   Other: f/u after pain management visit    REASON FOR VISIT: f/u after pain clinic visit    HISTORY OF PRESENT ILL dyspnea, weight loss, or fatigue. Reports he is trying to get tighter control of his blood sugars and lower his A1C.     REVIEW OF SYSTEMS:  Denies fever, chills, shortness of breath, chest pain.     PHYSICAL EXAMINATION:  /70 (BP Location: Right arm, discussed. Advised that there are no surgical options at this time. Continue to recommend injections. Would recommend he have injections prior to considering SCS.   As our pain management service will not do injections have given him a referral to an out

## 2017-06-26 NOTE — PATIENT INSTRUCTIONS
Refill policies:    • Allow 2-3 business days for refills; controlled substances may take longer.   • Contact your pharmacy at least 5 days prior to running out of medication and have them send an electronic request or submit request through the [facility] have a procedure or additional testing performed. [facility]) will contact your insurance carrier to obtain pre-certification or prior authorization.     Unfortunately, [facility]K has seen an increase in denial of payment even though the p

## 2017-06-29 ENCOUNTER — APPOINTMENT (OUTPATIENT)
Dept: PHYSICAL THERAPY | Age: 52
End: 2017-06-29
Attending: NEUROLOGICAL SURGERY
Payer: MEDICAID

## 2017-06-29 ENCOUNTER — CHARTING TRANS (OUTPATIENT)
Dept: OTHER | Age: 52
End: 2017-06-29

## 2017-07-06 ENCOUNTER — APPOINTMENT (OUTPATIENT)
Dept: PHYSICAL THERAPY | Age: 52
End: 2017-07-06
Attending: NEUROLOGICAL SURGERY

## 2017-07-11 ENCOUNTER — TELEPHONE (OUTPATIENT)
Dept: SURGERY | Facility: CLINIC | Age: 52
End: 2017-07-11

## 2017-08-07 ENCOUNTER — HOSPITAL (OUTPATIENT)
Dept: OTHER | Age: 52
End: 2017-08-07
Attending: ANESTHESIOLOGY

## 2017-08-19 ENCOUNTER — HOSPITAL ENCOUNTER (EMERGENCY)
Age: 52
Discharge: HOME | End: 2017-08-19
Attending: EMERGENCY MEDICINE
Payer: MEDICAID

## 2017-08-19 VITALS
RESPIRATION RATE: 20 BRPM | OXYGEN SATURATION: 99 % | HEIGHT: 74 IN | SYSTOLIC BLOOD PRESSURE: 134 MMHG | DIASTOLIC BLOOD PRESSURE: 77 MMHG | WEIGHT: 200 LBS | TEMPERATURE: 98 F | HEART RATE: 90 BPM | BODY MASS INDEX: 25.67 KG/M2

## 2017-08-19 DIAGNOSIS — R73.9 HYPERGLYCEMIA: Primary | ICD-10-CM

## 2017-08-19 DIAGNOSIS — E86.0 DEHYDRATION: ICD-10-CM

## 2017-08-19 LAB
ALBUMIN SERPL-MCNC: 3.4 G/DL (ref 3.5–4.8)
ALP LIVER SERPL-CCNC: 52 U/L (ref 45–117)
ALT SERPL-CCNC: 40 U/L (ref 17–63)
AST SERPL-CCNC: 26 U/L (ref 15–41)
BASOPHILS # BLD AUTO: 0.09 X10(3) UL (ref 0–0.1)
BASOPHILS NFR BLD AUTO: 1 %
BILIRUB SERPL-MCNC: 0.6 MG/DL (ref 0.1–2)
BILIRUB UR QL STRIP.AUTO: NEGATIVE
BUN BLD-MCNC: 14 MG/DL (ref 8–20)
CALCIUM BLD-MCNC: 8.7 MG/DL (ref 8.3–10.3)
CHLORIDE: 99 MMOL/L (ref 101–111)
CLARITY UR REFRACT.AUTO: CLEAR
CO2: 26 MMOL/L (ref 22–32)
COLOR UR AUTO: YELLOW
CREAT BLD-MCNC: 1.42 MG/DL (ref 0.7–1.3)
EOSINOPHIL # BLD AUTO: 0.06 X10(3) UL (ref 0–0.3)
EOSINOPHIL NFR BLD AUTO: 0.7 %
ERYTHROCYTE [DISTWIDTH] IN BLOOD BY AUTOMATED COUNT: 14.2 % (ref 11.5–16)
GLUCOSE BLD-MCNC: 395 MG/DL (ref 65–99)
GLUCOSE BLD-MCNC: 479 MG/DL (ref 70–99)
GLUCOSE UR STRIP.AUTO-MCNC: >=1000 MG/DL
HCT VFR BLD AUTO: 45.5 % (ref 37–53)
HGB BLD-MCNC: 15.2 G/DL (ref 13–17)
IMMATURE GRANULOCYTE COUNT: 0.03 X10(3) UL (ref 0–1)
IMMATURE GRANULOCYTE RATIO %: 0.3 %
KETONES UR STRIP.AUTO-MCNC: 40 MG/DL
LEUKOCYTE ESTERASE UR QL STRIP.AUTO: NEGATIVE
LYMPHOCYTES # BLD AUTO: 1.56 X10(3) UL (ref 0.9–4)
LYMPHOCYTES NFR BLD AUTO: 17.9 %
M PROTEIN MFR SERPL ELPH: 6.6 G/DL (ref 6.1–8.3)
MCH RBC QN AUTO: 30.6 PG (ref 27–33.2)
MCHC RBC AUTO-ENTMCNC: 33.4 G/DL (ref 31–37)
MCV RBC AUTO: 91.5 FL (ref 80–99)
MONOCYTES # BLD AUTO: 0.79 X10(3) UL (ref 0.1–0.6)
MONOCYTES NFR BLD AUTO: 9 %
NEUTROPHIL ABS PRELIM: 6.2 X10 (3) UL (ref 1.3–6.7)
NEUTROPHILS # BLD AUTO: 6.2 X10(3) UL (ref 1.3–6.7)
NEUTROPHILS NFR BLD AUTO: 71.1 %
NITRITE UR QL STRIP.AUTO: NEGATIVE
PH UR STRIP.AUTO: 6 [PH] (ref 4.5–8)
PLATELET # BLD AUTO: 218 10(3)UL (ref 150–450)
POTASSIUM SERPL-SCNC: 4 MMOL/L (ref 3.6–5.1)
PROT UR STRIP.AUTO-MCNC: NEGATIVE MG/DL
RBC # BLD AUTO: 4.97 X10(6)UL (ref 4.3–5.7)
RBC UR QL AUTO: NEGATIVE
RED CELL DISTRIBUTION WIDTH-SD: 47.6 FL (ref 35.1–46.3)
SODIUM SERPL-SCNC: 132 MMOL/L (ref 136–144)
SP GR UR STRIP.AUTO: 1.01 (ref 1–1.03)
UROBILINOGEN UR STRIP.AUTO-MCNC: 0.2 MG/DL
WBC # BLD AUTO: 8.7 X10(3) UL (ref 4–13)

## 2017-08-19 PROCEDURE — 81003 URINALYSIS AUTO W/O SCOPE: CPT | Performed by: PHYSICIAN ASSISTANT

## 2017-08-19 PROCEDURE — 99284 EMERGENCY DEPT VISIT MOD MDM: CPT

## 2017-08-19 PROCEDURE — 85025 COMPLETE CBC W/AUTO DIFF WBC: CPT | Performed by: PHYSICIAN ASSISTANT

## 2017-08-19 PROCEDURE — 96361 HYDRATE IV INFUSION ADD-ON: CPT

## 2017-08-19 PROCEDURE — 82962 GLUCOSE BLOOD TEST: CPT

## 2017-08-19 PROCEDURE — 80053 COMPREHEN METABOLIC PANEL: CPT | Performed by: PHYSICIAN ASSISTANT

## 2017-08-19 PROCEDURE — 96374 THER/PROPH/DIAG INJ IV PUSH: CPT

## 2017-08-19 PROCEDURE — 82009 KETONE BODYS QUAL: CPT | Performed by: PHYSICIAN ASSISTANT

## 2017-08-19 RX ORDER — MORPHINE SULFATE 15 MG/1
15 TABLET ORAL EVERY 4 HOURS PRN
COMMUNITY
End: 2017-10-02

## 2017-08-19 RX ORDER — KETOROLAC TROMETHAMINE 30 MG/ML
15 INJECTION, SOLUTION INTRAMUSCULAR; INTRAVENOUS ONCE
Status: COMPLETED | OUTPATIENT
Start: 2017-08-19 | End: 2017-08-19

## 2017-08-19 RX ORDER — MORPHINE SULFATE 15 MG/1
15 TABLET, FILM COATED, EXTENDED RELEASE ORAL 2 TIMES DAILY
COMMUNITY
End: 2018-01-02

## 2017-08-19 NOTE — ED PROVIDER NOTES
Patient Seen in: THE [facility] Emergency Department In [place]    History   Patient presents with:  Hyperglycemia (metabolic)    Stated Complaint: fatigue- diabetic- concerned for DKA    HPI    CHIEF COMPLAINT: Fatigue, polydipsia, hyperglycemia    HISTORY O Rheumatoid arthritis(714.0) ([facility] [address]    • Sleep apnea    • Type I (juvenile type) diabetes mellitus without mention of complication, not stated as uncontrolled    • Unspecified essential hypertension        Past Surgical History:  2007: BACK SURGERY  10/30/16 Release,  Take 20 mg by mouth daily.  Indications: Gastroesophageal Reflux Disease with Current Symptoms       Family History   Problem Relation Age of Onset   • Diabetes Father    • Heart Disorder Father    • Hypertension Mother        Smoking status: Curr agitation       ED Course     Labs Reviewed   COMP METABOLIC PANEL (14) - Abnormal; Notable for the following:        Result Value    Glucose 479 (*)     Creatinine 1.42 (*)     GFR 57 (*)     Albumin 3.4 (*)     Sodium 132 (*)     Chloride 99 (*)     All 1250: Patient is alert and oriented ×4 in no acute distress. Patient states that he is feeling better status post the 2 L of normal saline.   Patient's overall laboratory workup looked well and patient is not in DKA will be discharged home with close fol

## 2017-08-19 NOTE — ED INITIAL ASSESSMENT (HPI)
Pt with fatigue for the last 2 days, pt is diabetic and has an insulin pump but hasn't been checking his sugars. Checked his ketones at home and they were high.

## 2017-08-22 ENCOUNTER — HOSPITAL ENCOUNTER (OUTPATIENT)
Facility: HOSPITAL | Age: 52
Setting detail: OBSERVATION
LOS: 1 days | Discharge: HOME | End: 2017-08-23
Attending: EMERGENCY MEDICINE | Admitting: HOSPITALIST
Payer: MEDICAID

## 2017-08-22 ENCOUNTER — APPOINTMENT (OUTPATIENT)
Dept: GENERAL RADIOLOGY | Facility: HOSPITAL | Age: 52
End: 2017-08-22
Payer: MEDICAID

## 2017-08-22 DIAGNOSIS — R19.7 DIARRHEA, UNSPECIFIED TYPE: ICD-10-CM

## 2017-08-22 DIAGNOSIS — R07.9 CHEST PAIN, RULE OUT ACUTE MYOCARDIAL INFARCTION: Primary | ICD-10-CM

## 2017-08-22 LAB
ALBUMIN SERPL-MCNC: 3.5 G/DL (ref 3.5–4.8)
ALP LIVER SERPL-CCNC: 61 U/L (ref 45–117)
ALT SERPL-CCNC: 76 U/L (ref 17–63)
AST SERPL-CCNC: 57 U/L (ref 15–41)
BASOPHILS # BLD AUTO: 0.1 X10(3) UL (ref 0–0.1)
BASOPHILS NFR BLD AUTO: 1.1 %
BILIRUB SERPL-MCNC: 0.3 MG/DL (ref 0.1–2)
BILIRUB UR QL STRIP.AUTO: NEGATIVE
BUN BLD-MCNC: 9 MG/DL (ref 8–20)
CALCIUM BLD-MCNC: 9.4 MG/DL (ref 8.3–10.3)
CHLORIDE: 102 MMOL/L (ref 101–111)
CLARITY UR REFRACT.AUTO: CLEAR
CO2: 28 MMOL/L (ref 22–32)
COLOR UR AUTO: YELLOW
CREAT BLD-MCNC: 1.23 MG/DL (ref 0.7–1.3)
EOSINOPHIL # BLD AUTO: 0.36 X10(3) UL (ref 0–0.3)
EOSINOPHIL NFR BLD AUTO: 3.9 %
ERYTHROCYTE [DISTWIDTH] IN BLOOD BY AUTOMATED COUNT: 13.9 % (ref 11.5–16)
EST. AVERAGE GLUCOSE BLD GHB EST-MCNC: 243 MG/DL (ref 68–126)
GLUCOSE BLD-MCNC: 141 MG/DL (ref 65–99)
GLUCOSE BLD-MCNC: 239 MG/DL (ref 70–99)
GLUCOSE BLD-MCNC: 80 MG/DL (ref 65–99)
GLUCOSE UR STRIP.AUTO-MCNC: >=500 MG/DL
HBA1C MFR BLD HPLC: 10.1 % (ref ?–5.7)
HCT VFR BLD AUTO: 39.9 % (ref 37–53)
HGB BLD-MCNC: 13.5 G/DL (ref 13–17)
IMMATURE GRANULOCYTE COUNT: 0.02 X10(3) UL (ref 0–1)
IMMATURE GRANULOCYTE RATIO %: 0.2 %
KETONES UR STRIP.AUTO-MCNC: NEGATIVE MG/DL
LEUKOCYTE ESTERASE UR QL STRIP.AUTO: NEGATIVE
LYMPHOCYTES # BLD AUTO: 2.54 X10(3) UL (ref 0.9–4)
LYMPHOCYTES NFR BLD AUTO: 27.4 %
M PROTEIN MFR SERPL ELPH: 6.9 G/DL (ref 6.1–8.3)
MCH RBC QN AUTO: 30.1 PG (ref 27–33.2)
MCHC RBC AUTO-ENTMCNC: 33.8 G/DL (ref 31–37)
MCV RBC AUTO: 89.1 FL (ref 80–99)
MONOCYTES # BLD AUTO: 0.94 X10(3) UL (ref 0.1–0.6)
MONOCYTES NFR BLD AUTO: 10.1 %
NEUTROPHIL ABS PRELIM: 5.32 X10 (3) UL (ref 1.3–6.7)
NEUTROPHILS # BLD AUTO: 5.32 X10(3) UL (ref 1.3–6.7)
NEUTROPHILS NFR BLD AUTO: 57.3 %
NITRITE UR QL STRIP.AUTO: NEGATIVE
PH UR STRIP.AUTO: 5 [PH] (ref 4.5–8)
PLATELET # BLD AUTO: 188 10(3)UL (ref 150–450)
POTASSIUM SERPL-SCNC: 4.2 MMOL/L (ref 3.6–5.1)
PROT UR STRIP.AUTO-MCNC: NEGATIVE MG/DL
RBC # BLD AUTO: 4.48 X10(6)UL (ref 4.3–5.7)
RBC UR QL AUTO: NEGATIVE
RED CELL DISTRIBUTION WIDTH-SD: 45 FL (ref 35.1–46.3)
SODIUM SERPL-SCNC: 138 MMOL/L (ref 136–144)
SP GR UR STRIP.AUTO: 1.03 (ref 1–1.03)
TROPONIN: <0.046 NG/ML (ref ?–0.05)
UROBILINOGEN UR STRIP.AUTO-MCNC: <2 MG/DL
WBC # BLD AUTO: 9.3 X10(3) UL (ref 4–13)

## 2017-08-22 PROCEDURE — 99223 1ST HOSP IP/OBS HIGH 75: CPT | Performed by: HOSPITALIST

## 2017-08-22 PROCEDURE — 71010 XR CHEST AP PORTABLE  (CPT=71010): CPT | Performed by: EMERGENCY MEDICINE

## 2017-08-22 RX ORDER — LISINOPRIL 20 MG/1
20 TABLET ORAL DAILY
Status: DISCONTINUED | OUTPATIENT
Start: 2017-08-22 | End: 2017-08-23

## 2017-08-22 RX ORDER — ONDANSETRON 2 MG/ML
4 INJECTION INTRAMUSCULAR; INTRAVENOUS ONCE
Status: COMPLETED | OUTPATIENT
Start: 2017-08-22 | End: 2017-08-22

## 2017-08-22 RX ORDER — NITROGLYCERIN 0.4 MG/1
0.4 TABLET SUBLINGUAL EVERY 5 MIN PRN
Status: DISCONTINUED | OUTPATIENT
Start: 2017-08-22 | End: 2017-08-23

## 2017-08-22 RX ORDER — SODIUM CHLORIDE 9 MG/ML
INJECTION, SOLUTION INTRAVENOUS CONTINUOUS
Status: ACTIVE | OUTPATIENT
Start: 2017-08-22 | End: 2017-08-22

## 2017-08-22 RX ORDER — ATORVASTATIN CALCIUM 40 MG/1
40 TABLET, FILM COATED ORAL NIGHTLY
Status: DISCONTINUED | OUTPATIENT
Start: 2017-08-22 | End: 2017-08-23

## 2017-08-22 RX ORDER — CYCLOBENZAPRINE HCL 10 MG
10 TABLET ORAL NIGHTLY
Status: DISCONTINUED | OUTPATIENT
Start: 2017-08-22 | End: 2017-08-23

## 2017-08-22 RX ORDER — ENOXAPARIN SODIUM 100 MG/ML
40 INJECTION SUBCUTANEOUS DAILY
Status: DISCONTINUED | OUTPATIENT
Start: 2017-08-22 | End: 2017-08-23

## 2017-08-22 RX ORDER — FLUOXETINE HYDROCHLORIDE 20 MG/1
40 CAPSULE ORAL NIGHTLY
Status: DISCONTINUED | OUTPATIENT
Start: 2017-08-22 | End: 2017-08-23

## 2017-08-22 RX ORDER — GABAPENTIN 300 MG/1
300 CAPSULE ORAL 3 TIMES DAILY
Status: DISCONTINUED | OUTPATIENT
Start: 2017-08-22 | End: 2017-08-23

## 2017-08-22 RX ORDER — MIRTAZAPINE 15 MG/1
45 TABLET, FILM COATED ORAL DAILY
Status: DISCONTINUED | OUTPATIENT
Start: 2017-08-22 | End: 2017-08-23

## 2017-08-22 RX ORDER — DEXTROSE MONOHYDRATE 25 G/50ML
50 INJECTION, SOLUTION INTRAVENOUS
Status: DISCONTINUED | OUTPATIENT
Start: 2017-08-22 | End: 2017-08-23

## 2017-08-22 RX ORDER — ACETAMINOPHEN 325 MG/1
650 TABLET ORAL EVERY 6 HOURS PRN
Status: DISCONTINUED | OUTPATIENT
Start: 2017-08-22 | End: 2017-08-23

## 2017-08-22 RX ORDER — MORPHINE SULFATE 15 MG/1
15 TABLET ORAL EVERY 4 HOURS PRN
Status: DISCONTINUED | OUTPATIENT
Start: 2017-08-22 | End: 2017-08-23

## 2017-08-22 RX ORDER — METOPROLOL SUCCINATE 50 MG/1
50 TABLET, EXTENDED RELEASE ORAL NIGHTLY
Status: DISCONTINUED | OUTPATIENT
Start: 2017-08-22 | End: 2017-08-23

## 2017-08-22 RX ORDER — MORPHINE SULFATE 15 MG/1
15 TABLET, FILM COATED, EXTENDED RELEASE ORAL 2 TIMES DAILY
Status: DISCONTINUED | OUTPATIENT
Start: 2017-08-22 | End: 2017-08-23

## 2017-08-22 RX ORDER — BUSPIRONE HYDROCHLORIDE 15 MG/1
15 TABLET ORAL 3 TIMES DAILY
COMMUNITY
End: 2018-02-13

## 2017-08-22 RX ORDER — ASPIRIN 325 MG
325 TABLET ORAL DAILY
Status: DISCONTINUED | OUTPATIENT
Start: 2017-08-22 | End: 2017-08-23

## 2017-08-22 RX ORDER — PANTOPRAZOLE SODIUM 20 MG/1
20 TABLET, DELAYED RELEASE ORAL
Status: DISCONTINUED | OUTPATIENT
Start: 2017-08-23 | End: 2017-08-23

## 2017-08-22 RX ORDER — CLONAZEPAM 0.5 MG/1
0.5 TABLET ORAL 2 TIMES DAILY PRN
Status: DISCONTINUED | OUTPATIENT
Start: 2017-08-22 | End: 2017-08-23

## 2017-08-22 RX ORDER — HYDROMORPHONE HYDROCHLORIDE 1 MG/ML
1 INJECTION, SOLUTION INTRAMUSCULAR; INTRAVENOUS; SUBCUTANEOUS ONCE
Status: COMPLETED | OUTPATIENT
Start: 2017-08-22 | End: 2017-08-22

## 2017-08-22 NOTE — ED INITIAL ASSESSMENT (HPI)
Pt seen at PED for diarrhea and dehydration on Saturday. Reports continued diarrhea, generalized fatigue. Chest pain started 3 hours ago radiating into neck and back. Reports blood sugars have been over 200 for last week.

## 2017-08-23 ENCOUNTER — APPOINTMENT (OUTPATIENT)
Dept: CV DIAGNOSTICS | Facility: HOSPITAL | Age: 52
End: 2017-08-23
Attending: HOSPITALIST
Payer: MEDICAID

## 2017-08-23 ENCOUNTER — APPOINTMENT (OUTPATIENT)
Dept: CV DIAGNOSTICS | Facility: HOSPITAL | Age: 52
End: 2017-08-23
Attending: INTERNAL MEDICINE
Payer: MEDICAID

## 2017-08-23 VITALS
RESPIRATION RATE: 18 BRPM | WEIGHT: 203.5 LBS | TEMPERATURE: 98 F | SYSTOLIC BLOOD PRESSURE: 140 MMHG | BODY MASS INDEX: 26.12 KG/M2 | OXYGEN SATURATION: 97 % | HEART RATE: 87 BPM | DIASTOLIC BLOOD PRESSURE: 71 MMHG | HEIGHT: 74 IN

## 2017-08-23 LAB
ATRIAL RATE: 73 BPM
ATRIAL RATE: 92 BPM
CHOLEST SMN-MCNC: 56 MG/DL (ref ?–200)
D-DIMER: 0.37 UG/ML FEU (ref 0–0.49)
GLUCOSE BLD-MCNC: 115 MG/DL (ref 65–99)
GLUCOSE BLD-MCNC: 153 MG/DL (ref 65–99)
GLUCOSE BLD-MCNC: 182 MG/DL (ref 65–99)
GLUCOSE BLD-MCNC: 216 MG/DL (ref 65–99)
GLUCOSE BLD-MCNC: 63 MG/DL (ref 65–99)
GLUCOSE BLD-MCNC: 69 MG/DL (ref 65–99)
GLUCOSE BLD-MCNC: 78 MG/DL (ref 65–99)
HDLC SERPL-MCNC: 25 MG/DL (ref 45–?)
HDLC SERPL: 2.24 {RATIO} (ref ?–4.97)
LDLC SERPL CALC-MCNC: 6 MG/DL (ref ?–130)
LDLC SERPL-MCNC: 25 MG/DL (ref 5–40)
NONHDLC SERPL-MCNC: 31 MG/DL (ref ?–130)
P AXIS: 33 DEGREES
P AXIS: 4 DEGREES
P-R INTERVAL: 130 MS
P-R INTERVAL: 130 MS
Q-T INTERVAL: 330 MS
Q-T INTERVAL: 336 MS
QRS DURATION: 86 MS
QRS DURATION: 90 MS
QTC CALCULATION (BEZET): 370 MS
QTC CALCULATION (BEZET): 408 MS
R AXIS: 51 DEGREES
R AXIS: 52 DEGREES
T AXIS: 24 DEGREES
T AXIS: 26 DEGREES
TRIGLYCERIDES: 124 MG/DL (ref ?–150)
TROPONIN: <0.046 NG/ML (ref ?–0.05)
TROPONIN: <0.046 NG/ML (ref ?–0.05)
VENTRICULAR RATE: 73 BPM
VENTRICULAR RATE: 92 BPM

## 2017-08-23 PROCEDURE — 99232 SBSQ HOSP IP/OBS MODERATE 35: CPT | Performed by: HOSPITALIST

## 2017-08-23 PROCEDURE — 93018 CV STRESS TEST I&R ONLY: CPT | Performed by: INTERNAL MEDICINE

## 2017-08-23 PROCEDURE — 93306 TTE W/DOPPLER COMPLETE: CPT | Performed by: HOSPITALIST

## 2017-08-23 PROCEDURE — 93017 CV STRESS TEST TRACING ONLY: CPT | Performed by: INTERNAL MEDICINE

## 2017-08-23 PROCEDURE — 78452 HT MUSCLE IMAGE SPECT MULT: CPT | Performed by: INTERNAL MEDICINE

## 2017-08-23 RX ORDER — DEXTROSE MONOHYDRATE 25 G/50ML
50 INJECTION, SOLUTION INTRAVENOUS
Status: DISCONTINUED | OUTPATIENT
Start: 2017-08-23 | End: 2017-08-23

## 2017-08-23 NOTE — CM/SW NOTE
Patient failed inpatient criteria. Second level of review completed and supports observation. UR committee in agreement. Discussed with Dr. Samy Toro who approves observation status. Observation letter given to the patient and order written.

## 2017-08-23 NOTE — PROGRESS NOTES
Pt. Awake and alert not in any form of distress \" I feel by BS is low \" ; check BS 63 mg/dl. Hypoglycemic protocol initiated. Will monitor.

## 2017-08-23 NOTE — PROGRESS NOTES
Preliminary Lexiscan Nuclear Stress    No ECG changes noted  Denies cardiac symptoms  No ectopy/arrhythmia noted  Nuclear pending

## 2017-08-23 NOTE — PROGRESS NOTES
Pt attempted to exercise on treadmill for nuclear stress test, c/o leg cramping after 7 minutes, HR only at 70% APMHR. S/w DMG APN, order received to change to Farnaz.

## 2017-08-23 NOTE — H&P
[facility]  History and Physical     Melida Tran Patient Status:  Emergency    1965 MRN [id number]   Location [address] [facility] Attending Montana Foreman MD   Hosp Day # 0 PCP Paulo Patton     Chief Complaint: laminectomy, Dr. Bernard Bear History:  reports that he has been smoking Cigarettes. He has a 1.00 pack-year smoking history. He has never used smokeless tobacco. He reports that he does not drink alcohol or use drugs.     Family History:   Family Hist (two) times daily as needed for Anxiety. Disp:  Rfl:    gabapentin 300 MG Oral Cap Take 300 mg by mouth 3 (three) times daily. Disp:  Rfl:    ATORVASTATIN CALCIUM OR Take 40 mg by mouth daily.    Disp:  Rfl:    omeprazole (PRILOSEC) 20 MG Oral Capsule Delay Imaging data reviewed in Epic. ASSESSMENT / PLAN:     1. Diarrhea-WBC as well as C. difficile have been sent. Will wait for results before starting Lomotil  2. Dehydration continue IV fluids  3. Depression stable  4. GERD  5.  Atypical chest discomfor

## 2017-08-23 NOTE — PROGRESS NOTES
IV and tele discontinued. Pt. Received discharge instructions and follow-up information. Paper prescription given for aspirin. Questions addressed and answered. Pt. Refused transport by wheelchair and was able to ambulate to d/c transportation.

## 2017-08-23 NOTE — CONSULTS
[facility]  Diabetes Consult Note    Nakia Spry Patient Status:  Inpatient    1965 MRN [id number]   [facility] 8NE-A Attending Cyn Garcia MD   Hosp Day # 1 PCP Spring Martin     Reason for Consult:     Insulin Pump insulin. Labs:    Recent Labs   Lab  08/22/17   2034  08/23/17   0033  08/23/17   0515  08/23/17   0535  08/23/17   0717   PGLU  80  216*  63*  115*  153*       Recent Labs   08/22/17  1511  08/23/17 0717     --   --      --   --    CO2 28. this.  Again, reinforced the need to follow-up with endocrinology. The patient stated he bolused for his food this morning, but did not correct his blood glucose because of treadmill schedule for approximately noon.   He states he has urine ketone stix a

## 2017-08-23 NOTE — PAYOR COMM NOTE
--------------  ADMISSION REVIEW     Payor: Eduardo Foy #:  [id number]  Authorization Number: N/A    Admit date: N/A  Admit time: N/A       Admitting Physician: Vanesa Stahl MD  Attending Physician:  Abdulaziz Arshad Abnormal; Notable for the following:     Monocyte Absolute 0.94 (*)     Eosinophil Absolute 0.36 (*)     All other components within normal limits   TROPONIN I - Normal   CBC WITH DIFFERENTIAL WITH PLATELET    Narrative:      The following orders were creat STOOL[EP.2]     EKG    Rate, intervals and axes as noted on EKG Report. Rate: 92 BPM  Rhythm: Sinus Rhythm  Reading: Normal sinus rhythm with T-wave inversions laterally and T-wave flattening inferiorly.   When compared to EKG dated May 29 of 2017 these ar 10 mg Oral Cheng MORRELL RN      Enoxaparin Sodium (LOVENOX) 40 MG/0.4ML injection 40 mg     Date Action Dose Route User    8/23/2017 0830 Given 40 mg Subcutaneous (Left Lower Abdomen) Dani Millan RN    8/22/2017 2111 Given 40 mg Subcutaneous ([facility] chloride 0.9% IV bolus 1,000 mL     Date Action Dose Route User    8/22/2017 1511 New Bag 1000 mL Intravenous Patrick Taylor, RN        PLEASE FAX DAYS CERTIFIED AND NEXT REVIEW DATE

## 2017-08-23 NOTE — PROGRESS NOTES
[facility]  Progress Note     Jerome Sibley Patient Status:  Observation    1965 MRN [id number]   [facility] 8NE-A Attending Irina Pond MD   Hosp Day # 0 PCP Chance Jameson     Chief Complaint: chest pain  S: Patient mirtazapine  45 mg Oral Daily   • morphINE Sulfate ER  15 mg Oral BID   • Pantoprazole Sodium  20 mg Oral QAM AC   • aspirin  325 mg Oral Daily   • enoxaparin  40 mg Subcutaneous Daily   • Insulin Aspart Pen  2-10 Units Subcutaneous TID CC and HS       ASS

## 2017-08-23 NOTE — RESPIRATORY THERAPY NOTE
Patient does wear an \"oral device\" to sleep. Has refused to wear a CPAP for tonight. He states that if he is still here tomorrow that he will reconsider the CPAP while he is here.

## 2017-08-23 NOTE — CONSULTS
[facility]  Endocrinology Consultation    Abimbola Rice Patient Status:  Observation    1965 MRN [id number]   [facility] 8NE-A Attending Tyler Daley MD   Hosp Day # 0 PCP Clementina Quintero     Reason for Consultation:  T1DM, smoking history. He has never used smokeless tobacco. He reports that he does not drink alcohol or use drugs.     Medications:    Current Facility-Administered Medications:   •  atorvastatin (LIPITOR) tab 40 mg, 40 mg, Oral, Nightly  •  ClonazePAM [facility]. nausea, vomiting, or abdominal pain  : no new dysuria or hematuria  Endo: no new polyuria or polydipsia  MSK: no new muscle weakness  Neuro: no new tremors    PE    /79 (BP Location: Right arm)   Pulse 70   Temp (!) 97.4 °F (36.3 °C) (Oral)   Resp bolus via pump  Basal:  12a=1.350 units/hr  Insulin to Carbohydrate Ratios:  12a=1:8  Correction Insulin:  12a=1:20; 12p=1:20; 6p=1:20  Target Goal(s): 12a= mg/dl  Active Insulin:  4 hours  - will continue to monitor and adjust as needed  - upon disc

## 2017-08-23 NOTE — RESPIRATORY THERAPY NOTE
KLEVER - Equipment Use Daily Summary:                  . Set Mode: AUTO CPAP WITH C-FLEX                . Usage in hours: 2:03                . 90% Pressure (EPAP) level: 17.9                . 90% Insp. Pressure (IPAP): [facility]  AHI: 15.6

## 2017-08-24 NOTE — DISCHARGE SUMMARY
[facility]ITAL[facility]  DISCHARGE SUMMARY     Charline Rice Patient Status:  Observation    1965 MRN [id number]   [facility] 8NE-A Attending No att. providers found   Hosp Day # 1 PCP Radhika Ny     Date of Admission: 2017  D chills or any sick contacts. He denies using antibiotics for the past couple of months.       Brief Synopsis: admitted for chest pain and diarrhea; diarrhea resolved. Chest pain persisted but was minor; had negative stress test and low ddimer.   No compli 1     gabapentin 300 MG Caps  Commonly known as:  NEURONTIN      Take 300 mg by mouth 3 (three) times daily. Refills:  0     Insulin Lispro 100 UNIT/ML Soln  Commonly known as:  HUMALOG      Inject 1-68 Units into the skin daily.  For insulin pump  Medtro all extremities. Extremities: No edema.   -----------------------------------------------------------------------------------------------  PATIENT DISCHARGE INSTRUCTIONS: See electronic chart    Velasquez See MD 8/24/2017    Time spent:  > 30 minutes

## 2017-09-08 ENCOUNTER — HOSPITAL ENCOUNTER (EMERGENCY)
Facility: HOSPITAL | Age: 52
Discharge: HOME | End: 2017-09-08
Attending: EMERGENCY MEDICINE
Payer: MEDICAID

## 2017-09-08 ENCOUNTER — APPOINTMENT (OUTPATIENT)
Dept: CT IMAGING | Facility: HOSPITAL | Age: 52
End: 2017-09-08
Attending: EMERGENCY MEDICINE
Payer: MEDICAID

## 2017-09-08 VITALS
RESPIRATION RATE: 16 BRPM | SYSTOLIC BLOOD PRESSURE: 102 MMHG | BODY MASS INDEX: 26.31 KG/M2 | WEIGHT: 205 LBS | OXYGEN SATURATION: 95 % | TEMPERATURE: 99 F | HEIGHT: 74 IN | HEART RATE: 87 BPM | DIASTOLIC BLOOD PRESSURE: 73 MMHG

## 2017-09-08 DIAGNOSIS — K08.89 PAIN, DENTAL: Primary | ICD-10-CM

## 2017-09-08 LAB
ALBUMIN SERPL-MCNC: 3.5 G/DL (ref 3.5–4.8)
ALP LIVER SERPL-CCNC: 57 U/L (ref 45–117)
ALT SERPL-CCNC: 61 U/L (ref 17–63)
AST SERPL-CCNC: 38 U/L (ref 15–41)
BASOPHILS # BLD AUTO: 0.08 X10(3) UL (ref 0–0.1)
BASOPHILS NFR BLD AUTO: 1.1 %
BILIRUB SERPL-MCNC: 0.4 MG/DL (ref 0.1–2)
BUN BLD-MCNC: 13 MG/DL (ref 8–20)
CALCIUM BLD-MCNC: 9.1 MG/DL (ref 8.3–10.3)
CHLORIDE: 101 MMOL/L (ref 101–111)
CO2: 29 MMOL/L (ref 22–32)
CREAT BLD-MCNC: 0.97 MG/DL (ref 0.7–1.3)
EOSINOPHIL # BLD AUTO: 0.3 X10(3) UL (ref 0–0.3)
EOSINOPHIL NFR BLD AUTO: 4.1 %
ERYTHROCYTE [DISTWIDTH] IN BLOOD BY AUTOMATED COUNT: 14.2 % (ref 11.5–16)
GLUCOSE BLD-MCNC: 216 MG/DL (ref 70–99)
HCT VFR BLD AUTO: 47.9 % (ref 37–53)
HGB BLD-MCNC: 16.1 G/DL (ref 13–17)
IMMATURE GRANULOCYTE COUNT: 0.03 X10(3) UL (ref 0–1)
IMMATURE GRANULOCYTE RATIO %: 0.4 %
LYMPHOCYTES # BLD AUTO: 2.07 X10(3) UL (ref 0.9–4)
LYMPHOCYTES NFR BLD AUTO: 28.4 %
M PROTEIN MFR SERPL ELPH: 7.2 G/DL (ref 6.1–8.3)
MCH RBC QN AUTO: 30 PG (ref 27–33.2)
MCHC RBC AUTO-ENTMCNC: 33.6 G/DL (ref 31–37)
MCV RBC AUTO: 89.2 FL (ref 80–99)
MONOCYTES # BLD AUTO: 0.85 X10(3) UL (ref 0.1–0.6)
MONOCYTES NFR BLD AUTO: 11.6 %
NEUTROPHIL ABS PRELIM: 3.97 X10 (3) UL (ref 1.3–6.7)
NEUTROPHILS # BLD AUTO: 3.97 X10(3) UL (ref 1.3–6.7)
NEUTROPHILS NFR BLD AUTO: 54.4 %
PLATELET # BLD AUTO: 230 10(3)UL (ref 150–450)
POTASSIUM SERPL-SCNC: 4.3 MMOL/L (ref 3.6–5.1)
RBC # BLD AUTO: 5.37 X10(6)UL (ref 4.3–5.7)
RED CELL DISTRIBUTION WIDTH-SD: 46.1 FL (ref 35.1–46.3)
SODIUM SERPL-SCNC: 137 MMOL/L (ref 136–144)
WBC # BLD AUTO: 7.3 X10(3) UL (ref 4–13)

## 2017-09-08 PROCEDURE — 99284 EMERGENCY DEPT VISIT MOD MDM: CPT

## 2017-09-08 PROCEDURE — 96374 THER/PROPH/DIAG INJ IV PUSH: CPT

## 2017-09-08 PROCEDURE — 96375 TX/PRO/DX INJ NEW DRUG ADDON: CPT

## 2017-09-08 PROCEDURE — 70491 CT SOFT TISSUE NECK W/DYE: CPT | Performed by: EMERGENCY MEDICINE

## 2017-09-08 PROCEDURE — 85025 COMPLETE CBC W/AUTO DIFF WBC: CPT | Performed by: EMERGENCY MEDICINE

## 2017-09-08 PROCEDURE — 99285 EMERGENCY DEPT VISIT HI MDM: CPT

## 2017-09-08 PROCEDURE — 80053 COMPREHEN METABOLIC PANEL: CPT | Performed by: EMERGENCY MEDICINE

## 2017-09-08 RX ORDER — KETOROLAC TROMETHAMINE 30 MG/ML
30 INJECTION, SOLUTION INTRAMUSCULAR; INTRAVENOUS ONCE
Status: COMPLETED | OUTPATIENT
Start: 2017-09-08 | End: 2017-09-08

## 2017-09-08 RX ORDER — HYDROMORPHONE HYDROCHLORIDE 1 MG/ML
1 INJECTION, SOLUTION INTRAMUSCULAR; INTRAVENOUS; SUBCUTANEOUS ONCE
Status: COMPLETED | OUTPATIENT
Start: 2017-09-08 | End: 2017-09-08

## 2017-09-08 RX ORDER — ONDANSETRON 2 MG/ML
4 INJECTION INTRAMUSCULAR; INTRAVENOUS ONCE
Status: COMPLETED | OUTPATIENT
Start: 2017-09-08 | End: 2017-09-08

## 2017-09-08 RX ORDER — HYDROCODONE BITARTRATE AND ACETAMINOPHEN 10; 325 MG/1; MG/1
1 TABLET ORAL EVERY 4 HOURS PRN
Qty: 15 TABLET | Refills: 0 | Status: SHIPPED | OUTPATIENT
Start: 2017-09-08 | End: 2017-09-15

## 2017-09-08 RX ORDER — CLINDAMYCIN HYDROCHLORIDE 300 MG/1
300 CAPSULE ORAL EVERY 6 HOURS
COMMUNITY
End: 2017-10-26

## 2017-09-08 RX ORDER — LEVOFLOXACIN 500 MG/1
500 TABLET, FILM COATED ORAL DAILY
Qty: 10 TABLET | Refills: 0 | Status: SHIPPED | OUTPATIENT
Start: 2017-09-08 | End: 2017-09-18

## 2017-09-08 NOTE — ED PROVIDER NOTES
Patient Seen in: [facility] Emergency Department    History   Patient presents with:  Postop/Procedure Problem  Dental Problem (dental)    Stated Complaint: post procedure pain    HPI  This is a 52-year-old male who presents with dental pain.   He st Day Smoker                                                   Packs/day: 0.50      Years: 5.00         Types: Cigarettes  Smokeless tobacco: Never Used                      Alcohol use:  No                Review of Systems    Positive for stated complaint: p -----------         ------                     CBC W/ DIFFERENTIAL[[id number]]          Abnormal            Final result                 Please view results for these tests on the individual orders.    RAINBOW DRAW BLUE   RAINBOW DRAW LA the inferior floor of the maxillary sinus. OROPHARYNX:  Negative. Oral and lingual tonsils are symmetric. HYPOPHARYNX:  Negative. No mass or other visible lesion. LARYNX:  Negative. The vocal cords are symmetric and without mass.  SINUSES:  There is mu return here or pain. Or fevers. Levaquin will be added to cover more gram negatives along with the Clinda that he has now.     Disposition and Plan     Clinical Impression:  Pain, dental  (primary encounter diagnosis)    Disposition:  There is no disposit

## 2017-09-08 NOTE — ED INITIAL ASSESSMENT (HPI)
Right sided mouth/tooth pain. Patient said he had a tooth on the right side taken out by a dentist this past Tuesday. Today, patient said the pain is worse. Patient said the dentist that did his procedure was not able to take out two of the three roots.  Wa

## 2017-09-25 ENCOUNTER — HOSPITAL (OUTPATIENT)
Dept: OTHER | Age: 52
End: 2017-09-25
Attending: ANESTHESIOLOGY

## 2017-10-05 ENCOUNTER — CHARTING TRANS (OUTPATIENT)
Dept: OTHER | Age: 52
End: 2017-10-05

## 2017-10-09 ENCOUNTER — HOSPITAL (OUTPATIENT)
Dept: OTHER | Age: 52
End: 2017-10-09
Attending: INTERNAL MEDICINE

## 2017-10-19 ENCOUNTER — CHARTING TRANS (OUTPATIENT)
Dept: OTHER | Age: 52
End: 2017-10-19

## 2017-10-23 ENCOUNTER — APPOINTMENT (OUTPATIENT)
Dept: CT IMAGING | Age: 52
End: 2017-10-23
Attending: EMERGENCY MEDICINE
Payer: MEDICAID

## 2017-10-23 ENCOUNTER — HOSPITAL ENCOUNTER (OUTPATIENT)
Facility: HOSPITAL | Age: 52
Setting detail: OBSERVATION
Discharge: HOME | End: 2017-10-26
Attending: EMERGENCY MEDICINE | Admitting: HOSPITALIST
Payer: MEDICAID

## 2017-10-23 ENCOUNTER — APPOINTMENT (OUTPATIENT)
Dept: GENERAL RADIOLOGY | Age: 52
End: 2017-10-23
Attending: EMERGENCY MEDICINE
Payer: MEDICAID

## 2017-10-23 DIAGNOSIS — R55 SYNCOPE AND COLLAPSE: Primary | ICD-10-CM

## 2017-10-23 DIAGNOSIS — R10.9 PAIN IN THE ABDOMEN: ICD-10-CM

## 2017-10-23 PROCEDURE — 71020 XR CHEST PA + LAT CHEST (CPT=71020): CPT | Performed by: EMERGENCY MEDICINE

## 2017-10-23 PROCEDURE — 71275 CT ANGIOGRAPHY CHEST: CPT | Performed by: EMERGENCY MEDICINE

## 2017-10-23 PROCEDURE — 99220 INITIAL OBSERVATION CARE,LEVL III: CPT | Performed by: HOSPITALIST

## 2017-10-23 PROCEDURE — 70450 CT HEAD/BRAIN W/O DYE: CPT | Performed by: EMERGENCY MEDICINE

## 2017-10-23 RX ORDER — LISINOPRIL 20 MG/1
20 TABLET ORAL NIGHTLY
Status: DISCONTINUED | OUTPATIENT
Start: 2017-10-23 | End: 2017-10-24

## 2017-10-23 RX ORDER — MORPHINE SULFATE 15 MG/1
15 TABLET ORAL EVERY 4 HOURS PRN
Status: DISCONTINUED | OUTPATIENT
Start: 2017-10-23 | End: 2017-10-26

## 2017-10-23 RX ORDER — CYCLOBENZAPRINE HCL 10 MG
10 TABLET ORAL NIGHTLY PRN
Status: DISCONTINUED | OUTPATIENT
Start: 2017-10-23 | End: 2017-10-26

## 2017-10-23 RX ORDER — SODIUM CHLORIDE 9 MG/ML
INJECTION, SOLUTION INTRAVENOUS ONCE
Status: COMPLETED | OUTPATIENT
Start: 2017-10-23 | End: 2017-10-23

## 2017-10-23 RX ORDER — PANTOPRAZOLE SODIUM 20 MG/1
20 TABLET, DELAYED RELEASE ORAL NIGHTLY
Status: DISCONTINUED | OUTPATIENT
Start: 2017-10-23 | End: 2017-10-26

## 2017-10-23 RX ORDER — TEMAZEPAM 15 MG/1
15 CAPSULE ORAL NIGHTLY PRN
Status: DISCONTINUED | OUTPATIENT
Start: 2017-10-23 | End: 2017-10-26

## 2017-10-23 RX ORDER — DEXTROSE MONOHYDRATE 25 G/50ML
50 INJECTION, SOLUTION INTRAVENOUS
Status: DISCONTINUED | OUTPATIENT
Start: 2017-10-23 | End: 2017-10-26

## 2017-10-23 RX ORDER — MORPHINE SULFATE 15 MG/1
15 TABLET, FILM COATED, EXTENDED RELEASE ORAL 2 TIMES DAILY
Status: DISCONTINUED | OUTPATIENT
Start: 2017-10-23 | End: 2017-10-26

## 2017-10-23 RX ORDER — METOPROLOL SUCCINATE 50 MG/1
50 TABLET, EXTENDED RELEASE ORAL NIGHTLY
Status: DISCONTINUED | OUTPATIENT
Start: 2017-10-23 | End: 2017-10-25

## 2017-10-23 RX ORDER — BUSPIRONE HYDROCHLORIDE 5 MG/1
15 TABLET ORAL 3 TIMES DAILY
Status: DISCONTINUED | OUTPATIENT
Start: 2017-10-23 | End: 2017-10-26

## 2017-10-23 RX ORDER — CLONAZEPAM 0.5 MG/1
0.5 TABLET ORAL 2 TIMES DAILY PRN
Status: DISCONTINUED | OUTPATIENT
Start: 2017-10-23 | End: 2017-10-26

## 2017-10-23 RX ORDER — ATORVASTATIN CALCIUM 40 MG/1
40 TABLET, FILM COATED ORAL NIGHTLY
Status: DISCONTINUED | OUTPATIENT
Start: 2017-10-23 | End: 2017-10-26

## 2017-10-23 RX ORDER — FLUTICASONE PROPIONATE 50 MCG
2 SPRAY, SUSPENSION (ML) NASAL DAILY
COMMUNITY
End: 2018-04-17

## 2017-10-23 RX ORDER — MORPHINE SULFATE 2 MG/ML
2 INJECTION, SOLUTION INTRAMUSCULAR; INTRAVENOUS ONCE
Status: COMPLETED | OUTPATIENT
Start: 2017-10-23 | End: 2017-10-23

## 2017-10-23 RX ORDER — SODIUM CHLORIDE 9 MG/ML
INJECTION, SOLUTION INTRAVENOUS CONTINUOUS
Status: ACTIVE | OUTPATIENT
Start: 2017-10-23 | End: 2017-10-23

## 2017-10-23 RX ORDER — ACETAMINOPHEN 325 MG/1
650 TABLET ORAL EVERY 6 HOURS PRN
Status: DISCONTINUED | OUTPATIENT
Start: 2017-10-23 | End: 2017-10-26

## 2017-10-23 RX ORDER — SODIUM CHLORIDE 9 MG/ML
INJECTION, SOLUTION INTRAVENOUS ONCE
Status: COMPLETED | OUTPATIENT
Start: 2017-10-23 | End: 2017-10-24

## 2017-10-23 RX ORDER — FLUTICASONE PROPIONATE 50 MCG
2 SPRAY, SUSPENSION (ML) NASAL NIGHTLY
Status: DISCONTINUED | OUTPATIENT
Start: 2017-10-23 | End: 2017-10-26

## 2017-10-23 RX ORDER — FLUOXETINE HYDROCHLORIDE 20 MG/1
40 CAPSULE ORAL NIGHTLY
Status: DISCONTINUED | OUTPATIENT
Start: 2017-10-23 | End: 2017-10-26

## 2017-10-23 RX ORDER — NICOTINE 21 MG/24HR
1 PATCH, TRANSDERMAL 24 HOURS TRANSDERMAL DAILY
Status: DISCONTINUED | OUTPATIENT
Start: 2017-10-23 | End: 2017-10-26

## 2017-10-23 RX ORDER — MIRTAZAPINE 15 MG/1
45 TABLET, FILM COATED ORAL NIGHTLY
Status: DISCONTINUED | OUTPATIENT
Start: 2017-10-23 | End: 2017-10-26

## 2017-10-23 RX ORDER — ONDANSETRON 2 MG/ML
4 INJECTION INTRAMUSCULAR; INTRAVENOUS EVERY 4 HOURS PRN
Status: DISCONTINUED | OUTPATIENT
Start: 2017-10-23 | End: 2017-10-26

## 2017-10-23 NOTE — CONSULTS
[facility]    PATIENT'S NAME: Jenny Smith   ATTENDING PHYSICIAN: FILIBERTO Jacques: Edwin [place] M.D.    PATIENT ACCOUNT#:   [de-identified]    LOCATION:  [address] [place] [address]  MEDICAL RECORD #:   [id number]       DATE OF BIRTH: [birth year], respirations 17, pressure 128/81. HEENT:  Normocephalic, atraumatic. NECK:  Supple. LUNGS:  Clear bilaterally. HEART:  Regular rate and rhythm. ABDOMEN:  Soft. EXTREMITIES:  No cyanosis, clubbing or edema.   NEUROLOGIC:  No focal neurologic defici

## 2017-10-23 NOTE — H&P
[facility]  History and Physical     Charna Console Patient Status:  Observation    1965 MRN [id number]   [facility] 7NE-A Attending Beata Lr MD   Hosp Day # 0 PCP Rj Anderson     Chief Complaint: Patient presen Hypertension Mother      Allergies:   Penicillin G Benzat*        Comment:Redness and swelling in the hands  Medications:    No current facility-administered medications on file prior to encounter.    Current Outpatient Prescriptions on File Prior to [facility] for treatment of pain. Dispense one 480 gram tube for 30 day supply. Disp: 1 each Rfl: 11   gabapentin 300 MG Oral Cap Take 300 mg by mouth 3 (three) times daily.  Disp:  Rfl:        Review of Systems:   A comprehensive 14 point review of systems was compl from his diabetes versus overmedication with narcotics and other sedatives  3. Check orthostatics  4. Given the patient's risk factors he may benefit from a Linq implant if his workup is otherwise unremarkable  5. CTA: neg for PE  6.  Recent echo and ST don

## 2017-10-23 NOTE — ED PROVIDER NOTES
Patient Seen in: [facility] [place]    History   Patient presents with:  Syncope (cardiovascular, neurologic)  Dizziness (neurologic)    Stated Complaint: dizziness, syncope    HPI    Patient is a 60-year-old male presents emergenc Past Surgical History:  2007: BACK SURGERY      Comment: fusion L5-S1  10/30/16: CATARACT      Comment: right eye  2016: OTHER      Comment: CERVICAL SURGERY  10/12: OTHER SURGICAL HISTORY      Comment: left knee scoped  8/2016: Lv [place] or S4. No murmur. ABDOMEN: There is no focal tenderness to palpation appreciated anywhere throughout the abdomen. There is no guarding, no rebound, no mass, and no organomegaly appreciated. There is normoactive bowel sounds. There is no hernia.   EXTREMITI ============================================================  ED Course  ------------------------------------------------------------  MDM     Xr Chest Pa + Lat Chest ([id number]=[id number])    Result Date: 10/23/2017  CONCLUSION:  No focal consolidation.  Stable c will be admitted to the hospital for further care. Patient was admitted with no further new complaints.       Disposition and Plan     Clinical Impression:  Syncope and collapse  (primary encounter diagnosis)    Disposition:  Admit    Follow-up:  No follow

## 2017-10-23 NOTE — ED INITIAL ASSESSMENT (HPI)
Syncopal episode while at home on Saturday, \"out for a few seconds\", hit head, abrasion to forehead, witnessed by family, stayed in bed, today feeling weakness/fatigue, \"chronic back pain\", has insulin pump, today 186 glucose, denies chest pain, feels

## 2017-10-23 NOTE — CONSULTS
[facility]  Diabetes Clinical Nurse Specialist Consult Note    Froilan Rice Patient Status:  Observation    1965 MRN [id number]   [facility] 7NE-A Attending Al Saini MD   Hosp Day # 0 PCP Nicole Blanco     Reason for RECOMMENDATIONS:    · Patient's current PCP  · Endocrinologist (Dr. Jordin Cross with [facility] in [place])      Larry Helton, MSN, APN, ACNS-BC  Clinical Nurse Specialist  Diabetes Educator  10/23/2017  4:30 PM

## 2017-10-24 ENCOUNTER — APPOINTMENT (OUTPATIENT)
Dept: ULTRASOUND IMAGING | Facility: HOSPITAL | Age: 52
End: 2017-10-24
Attending: HOSPITALIST
Payer: MEDICAID

## 2017-10-24 ENCOUNTER — TELEPHONE (OUTPATIENT)
Dept: CARDIOLOGY UNIT | Facility: HOSPITAL | Age: 52
End: 2017-10-24

## 2017-10-24 PROCEDURE — 93880 EXTRACRANIAL BILAT STUDY: CPT | Performed by: HOSPITALIST

## 2017-10-24 PROCEDURE — 99225 SUBSEQUENT OBSERVATION CARE: CPT | Performed by: HOSPITALIST

## 2017-10-24 RX ORDER — ASPIRIN 81 MG/1
81 TABLET, CHEWABLE ORAL DAILY
Status: DISCONTINUED | OUTPATIENT
Start: 2017-10-24 | End: 2017-10-26

## 2017-10-24 RX ORDER — LISINOPRIL 5 MG/1
5 TABLET ORAL NIGHTLY
Status: DISCONTINUED | OUTPATIENT
Start: 2017-10-24 | End: 2017-10-26

## 2017-10-24 NOTE — PLAN OF CARE
METABOLIC/FLUID AND ELECTROLYTES - ADULT    • Glucose maintained within prescribed range Progressing        PAIN - ADULT    • Verbalizes/displays adequate comfort level or patient's stated pain goal Progressing        SAFETY ADULT - FALL    • Free from fal

## 2017-10-24 NOTE — PROGRESS NOTES
[facility]  Progress Note    Walter Rice Patient Status:  Observation    1965 MRN [id number]   [facility] 7NE-A Attending Bin Bernard MD   Hosp Day # 0 PCP Ravi Pinon       Assessment/Plan:52 yo with a h/o TOM, HTN rate 101 BPM   Atrial rate 101 BPM   P-R Interval 140 ms   QRS Duration 86 ms   Q-T Interval 332 ms   QTC Calculation (Bezet) 430 ms   P Axis 34 degrees   R Axis 64 degrees   T Axis 35 degrees   -PROTHROMBIN TIME (PT)   Collection Time: 10/23/17 10:13 AM Absolute Prelim 3.25 1.30 - 6.70 x10 (3) uL   Neutrophil Absolute 3.25 1.30 - 6.70 x10(3) uL   Lymphocyte Absolute 2.45 0.90 - 4.00 x10(3) uL   Monocyte Absolute 0.61 (H) 0.10 - 0.60 x10(3) uL   Eosinophil Absolute 0.18 0.00 - 0.30 x10(3) uL   Basophil Abs

## 2017-10-24 NOTE — PLAN OF CARE
Dr. Mildred Blankenship notified per Dr. Pineda Pi request to see if pt able to be DC home tonight. Per Dr. Mildred Blankenship, pt to stay to be observed overnoc.

## 2017-10-24 NOTE — PAYOR COMM NOTE
--------------  ADMISSION REVIEW     Payor: Eduardo Foy #:  [id number]  Authorization Number: N/A    Admit date:10/23/17       Admitting Physician: Zander García MD  Attending Physician:  Zander García MD  [facility] Rheumatoid arthritis(714.0)    • Sleep apnea     pt wears oral device CPAP in past   • Type I (juvenile type) diabetes mellitus without mention of complication, not stated as uncontrolled        Past Surgical History:  2007: BACK SURGERY      Comment: jorge luis organomegaly appreciated. There is normoactive bowel sounds. There is no hernia. EXTREMITIES: There is no cyanosis, clubbing, or edema appreciated. Pulses are 2+ and equal in all 4 extremities.   NEURO: Patient is awake, alert and oriented to time place an Theresa Lord MD[JZ.2]            Patient had an IV line established and blood work drawn including CBC, chemistries, BUN and creatinine, and blood sugar all of which are unremarkable. Patient's mild elevation of liver enzymes.   Patient's troponin is unremarkab Happened sat evening. He was getting up from bed to go to the bathroom and he started to feel lightheaded and he passed out. Yesterday he felt generally weak. He has had some night sweats, chills. +sore throat, no cough. No chest pain. No dyspnea.   No Appropriate mood and affect.       Diagnostic Data:      Labs:  Recent Labs   Lab  10/23/17   1013  10/23/17   1014   WBC   --   6.6   HGB   --   14.9   MCV   --   87.2   PLT   --   172.0   INR  1.03   --        Recent Labs   Lab  10/23/17   1014   GLU  248 Action Dose Route User    10/24/2017 1100 Self administered via pump 9 Units Subcutaneous (Left Lower Abdomen) Shubham Nguyen RN    10/24/2017 0700 Self administered via pump 13.9 Units Subcutaneous (Left Lower Abdomen) Shubham Nguyen RN    1 Daily Dose Basal (14 day average): 32.225 (52%)  Insulin to Carbohydrate Ratios:  1:8  Correction Insulin:  1:20  Total Daily Dose Bolus (14 day average):  30.1 (48%)  Target Goal(s):   Active Insulin:  4 hours     - continue to adjust regimen prn.

## 2017-10-24 NOTE — CONSULTS
[facility] [address] [place] [address] Cardiology  Report of Consultation    Glendy Chan Patient Status:  Observation    1965 MRN [id number]   [facility] 7NE-A Attending Yumiko Donnelly MD   Hosp Day # 0 PCP Osmel Muñiz     [facility] not stated as uncontrolled       Past Surgical History:  2007: BACK SURGERY      Comment: fusion L5-S1  10/30/16: CATARACT      Comment: right eye  2016: OTHER      Comment: CERVICAL SURGERY  10/12: OTHER SURGICAL HISTORY      Comment: left knee scoped  8/ Disp:  Rfl:    Cyclobenzaprine HCl 10 MG Oral Tab Take 1 tablet by mouth nightly as needed. Disp:  Rfl: 1   FLUoxetine HCl 40 MG Oral Cap Take 40 mg by mouth nightly. Disp:  Rfl: 1   lisinopril 20 MG Oral Tab Take 20 mg by mouth nightly.    Disp:  Rfl: 3 Normocephalic. Atraumatic. Eyes with no scleral icterus. Neck: Supple. No JVD. Carotids 2+ and equal in symmetric fashion. No bruits are noted. Cardiac: Regular rate and rhythm. There is a normal S1 and S2. No S3 or S4.   No murmurs, rubs, or gall The left atrial volume was mildly increased. 4. Aortic root: The aortic root was mildly dilated. 5. Ascending aorta: The ascending aorta was mildly dilated. 6. Mitral valve: There was trivial regurgitation. 7. Tricuspid valve:  There was mild regurgitat

## 2017-10-24 NOTE — PLAN OF CARE
NURSING ADMISSION NOTE      Patient admitted via Cart  Oriented to room. Safety precautions initiated. Bed in low position. Call light in reach. Admission complete  Pt c/o pain to his back which is chronic. Has abrasion to his left forehead.  No ot

## 2017-10-24 NOTE — CONSULTS
[facility]  Progress Note    Severiano Focrow Rice Patient Status:  Observation    1965 MRN [id number]   [facility] 7NE-A Attending Beata Lr MD   Hosp Day # 0 PCP Rj Anderson       Assessment/Plan:52 yo with a h/o TOM, HTN rate 101 BPM   P-R Interval 140 ms   QRS Duration 86 ms   Q-T Interval 332 ms   QTC Calculation (Bezet) 430 ms   P Axis 34 degrees   R Axis 64 degrees   T Axis 35 degrees   -PROTHROMBIN TIME (PT)   Collection Time: 10/23/17 10:13 AM   Result Value Ref Lenny Castellanos 6.70 x10 (3) uL   Neutrophil Absolute 3.25 1.30 - 6.70 x10(3) uL   Lymphocyte Absolute 2.45 0.90 - 4.00 x10(3) uL   Monocyte Absolute 0.61 (H) 0.10 - 0.60 x10(3) uL   Eosinophil Absolute 0.18 0.00 - 0.30 x10(3) uL   Basophil Absolute 0.10 0.00 - 0.10 x10(3

## 2017-10-24 NOTE — PROGRESS NOTES
[facility]  Progress Note     Mirtha Owens Patient Status:  Observation    1965 MRN [id number]   [facility] 7NE-A Attending Tom Starkey MD   Hosp Day # 0 PCP Ankit Ventura     Chief Complaint: syncope    S: Patient f • nicotine  1 patch Transdermal Daily       ASSESSMENT / PLAN:     1. Syncope  1. History is consistent with orthostatic hypotension but orthostatics neg  2.  Possibly secondary to dehydration versus autonomic dysfunction from his diabetes versus overmedi

## 2017-10-25 ENCOUNTER — TELEPHONE (OUTPATIENT)
Dept: CARDIOLOGY UNIT | Facility: HOSPITAL | Age: 52
End: 2017-10-25

## 2017-10-25 ENCOUNTER — APPOINTMENT (OUTPATIENT)
Dept: ULTRASOUND IMAGING | Facility: HOSPITAL | Age: 52
End: 2017-10-25
Attending: HOSPITALIST
Payer: MEDICAID

## 2017-10-25 PROBLEM — E10.8 TYPE 1 DIABETES MELLITUS WITH COMPLICATION (HCC): Status: ACTIVE | Noted: 2017-01-16

## 2017-10-25 PROCEDURE — 99225 SUBSEQUENT OBSERVATION CARE: CPT | Performed by: HOSPITALIST

## 2017-10-25 PROCEDURE — 76700 US EXAM ABDOM COMPLETE: CPT | Performed by: HOSPITALIST

## 2017-10-25 PROCEDURE — 93975 VASCULAR STUDY: CPT | Performed by: HOSPITALIST

## 2017-10-25 RX ORDER — LISINOPRIL 5 MG/1
5 TABLET ORAL NIGHTLY
Qty: 30 TABLET | Refills: 0 | Status: SHIPPED | OUTPATIENT
Start: 2017-10-25 | End: 2017-11-24

## 2017-10-25 RX ORDER — ASPIRIN 81 MG/1
81 TABLET, CHEWABLE ORAL DAILY
Qty: 30 TABLET | Refills: 0 | Status: SHIPPED | OUTPATIENT
Start: 2017-10-26 | End: 2017-11-25

## 2017-10-25 NOTE — PROGRESS NOTES
[facility] Cardiology  Progress Note    Severiano Carmine Patient Status:  Observation    1965 MRN [id number]   [facility] [place] 7NE-A Attending Raymond Ricks, DO   Hosp Day # 0 PCP BRAULIO DIAMOND     Subjective:   No chest pain t 205 lb (93 kg)      Allergies:    Penicillin G Benzat*        Comment:Redness and swelling in the hands    Physical Exam:   General:  Well-developed / Well-nourished. No acute distress. HEENT:  Normocephalic. Atraumatic. No icterus.   Neck:  There is no normal.     Systolic function was normal. Systolic pressure was within the normal     range. 3. Left atrium: The left atrial volume was mildly increased. 4. Aortic root: The aortic root was mildly dilated. 5. Ascending aorta:  The ascending aorta was mil

## 2017-10-25 NOTE — PLAN OF CARE
Pt AOx4. RA.  .  NSR. Abrasion to forehead. Insulin pump. MSO4 po pain relief. Pt NPO after midnight US Abdomen doppler. Will continue to monitor.

## 2017-10-25 NOTE — DISCHARGE SUMMARY
[facility] HOSPITALIST  DISCHARGE SUMMARY     Mirtha Owens Patient Status:  Observation    1965 MRN [id number]   [facility] 7NE-A Attending Velma Blackburn, DO   Hosp Day # 0 ANA ROSA Ventura     Date of Admission: 10/23/2017  Date o He has also had c/o left sided abd pain as well. Brief Synopsis: Patient admitted due to syncope. Cardiology consulted. Recent echo and stress test negative. CTA chest negative for PE. Orthostatics found to be positive.  Anti-hypertensive medications a treatment of pain. Dispense one 480 gram tube for 30 day supply. Quantity:  1 each  Refills:  11     CUSTOM MEDICATION      Lidocaine 2.5%, Prilocaine 2.5% cream #480 grams.    Apply 4 grams three to four times daily for treatment of pain Mitra Courser)   Allyn Summers [id number] [phone]    Schedule an appointment as soon as possible for a visit    -----------------------------------------------------------------------------------------------  PATIENT DISCHARGE INSTRUCTIONS: See electronic chart    Tamika Perez DO

## 2017-10-25 NOTE — PROGRESS NOTES
[facility]  Progress Note     Barabra Adams Patient Status:  Observation    1965 MRN [id number]   [facility] 7NE-A Attending Loy Terrell MD   Hosp Day # 0 PCP Tryese Harding     Chief Complaint: syncope    S: Patient s • nicotine  1 patch Transdermal Daily       ASSESSMENT / PLAN:     1. Syncope suspected secondary to orthostatic hypotension  1. Anti-hypertensive medications adjusted by cardiology   2. Recent Echo/ST neg  3. Check carotid US  4.  CTA: neg for PE  5. +ve

## 2017-10-25 NOTE — PROGRESS NOTES
[facility]  Progress Note    Hans Rice Patient Status:  Observation    1965 MRN [id number]   [facility] 7NE-A Attending Deborah Meléndez MD   Hosp Day # 0 PCP Reyes Nick       Assessment/Plan:50 yo with a h/o TOM, HTN -POCT GLUCOSE   Collection Time: 10/24/17  2:40 PM   Result Value Ref Range   POC Glucose 105 (H) 65 - 99 mg/dL   -EKG 12-LEAD   Collection Time: 10/24/17  3:21 PM   Result Value Ref Range   Ventricular rate 78 BPM   Atrial rate 78 BPM   P-R Interval 150

## 2017-10-26 ENCOUNTER — APPOINTMENT (OUTPATIENT)
Dept: CT IMAGING | Facility: HOSPITAL | Age: 52
End: 2017-10-26
Attending: HOSPITALIST
Payer: MEDICAID

## 2017-10-26 VITALS
OXYGEN SATURATION: 98 % | HEART RATE: 91 BPM | TEMPERATURE: 98 F | WEIGHT: 190 LBS | SYSTOLIC BLOOD PRESSURE: 109 MMHG | DIASTOLIC BLOOD PRESSURE: 82 MMHG | HEIGHT: 74 IN | BODY MASS INDEX: 24.38 KG/M2 | RESPIRATION RATE: 17 BRPM

## 2017-10-26 PROCEDURE — 99217 OBSERVATION CARE DISCHARGE: CPT | Performed by: HOSPITALIST

## 2017-10-26 PROCEDURE — 74177 CT ABD & PELVIS W/CONTRAST: CPT | Performed by: HOSPITALIST

## 2017-10-26 PROCEDURE — 71260 CT THORAX DX C+: CPT | Performed by: HOSPITALIST

## 2017-10-26 NOTE — PLAN OF CARE
Cleared for discharge pending CT of abdomen done for abd pain. Pt states he has had this pain for several months. Insulin pump per patient. Orthostatic BPs charted. Follow up cardiology in 2 weeks. Ambulating in room. No dz.

## 2017-10-26 NOTE — PROGRESS NOTES
[facility]  Progress Note     Gabe Del Rio Patient Status:  Observation    1965 MRN [id number]   [facility] 7NE-A Attending Willian Serna MD   Hosp Day # 0 PCP Ximena Cha     Chief Complaint: syncope    S: Patient s ASSESSMENT / PLAN:     1. Syncope suspected secondary to orthostatic hypotension  1. Anti-hypertensive medications adjusted by cardiology   2. Recent Echo/ST neg  3.  CTA: neg for PE  4. +ve for Asc thoracic aorta: will need monitoring  2. ? Splenic v

## 2017-10-26 NOTE — PROGRESS NOTES
[facility] Cardiology  Progress Note    Severiano Downs Patient Status:  Observation    1965 MRN [id number]   [facility] 7NE-A Attending Raymond Ricks, DO   Hosp Day # 0 PCP BRAULIO DIAMOND     Subjective:   No chest pain t Encounters:  10/23/17 : 190 lb (86.2 kg)  09/14/17 : 199 lb (90.3 kg)  09/08/17 : 205 lb (93 kg)      Allergies:    Penicillin G Benzat*        Comment:Redness and swelling in the hands    Physical Exam:   General:  Well-developed / Well-nourished.   No acu pressure was within the normal     range. 3. Left atrium: The left atrial volume was mildly increased. 4. Aortic root: The aortic root was mildly dilated. 5. Ascending aorta: The ascending aorta was mildly dilated. 6. Mitral valve:  There was trivial re

## 2017-10-26 NOTE — PROGRESS NOTES
10/26/17 0822 10/26/17 0823 10/26/17 0824   Vital Signs   Pulse 87 91 98   /73 107/72 103/64   Patient Position Lying Sitting Standing       Asymptomatic

## 2017-10-26 NOTE — PLAN OF CARE
Pt AOx4. RA.  . NSR.  MSO4 for pain relief. Abrasiont to forehead. Basil rate decreased to 1.25 units from 1.35. Will continue to monitor.

## 2017-10-26 NOTE — PROGRESS NOTES
NURSING DISCHARGE NOTE    Discharged Home via Ambulatory. Accompanied by self  Belongings Taken by patient/family. Discharge instructions reviewed with patient, he voiced understating. Declined use of wheelchair.  Ambulated self to entrance to c

## 2017-10-26 NOTE — PROGRESS NOTES
[facility]  Progress Note    Lashon Rice Patient Status:  Observation    1965 MRN [id number]   [facility] 7NE-A Attending Soha Downs MD   Hosp Day # 0 PCP aZina Gallardo       Assessment/Plan:52 yo with a h/o TOM, HTN mg/dL   -POCT GLUCOSE   Collection Time: 10/25/17  3:23 PM   Result Value Ref Range   POC Glucose 72 65 - 99 mg/dL   -POCT GLUCOSE   Collection Time: 10/25/17  4:00 PM   Result Value Ref Range   POC Glucose 141 (H) 65 - 99 mg/dL   -POCT GLUCOSE   Collectio

## 2017-11-02 ENCOUNTER — TELEPHONE (OUTPATIENT)
Dept: SURGERY | Facility: CLINIC | Age: 52
End: 2017-11-02

## 2017-11-02 ENCOUNTER — MED REC SCAN ONLY (OUTPATIENT)
Dept: SURGERY | Facility: CLINIC | Age: 52
End: 2017-11-02

## 2017-11-07 ENCOUNTER — TELEPHONE (OUTPATIENT)
Dept: SURGERY | Facility: CLINIC | Age: 52
End: 2017-11-07

## 2017-11-07 NOTE — TELEPHONE ENCOUNTER
Patient would like pain medication to get him to appointment 11/13/17 with Dr. Sirena Gutierrez. Has used Norco and morphine 30 mg Extended Release/15 mg short term.  Please call

## 2017-11-07 NOTE — TELEPHONE ENCOUNTER
Called pt to inform, our office has not prescribed these medications, reminded him of last conversation on 5/24/17, Narcotics are prescribed for surgical candidates in the post surgical phase on a temporary basis.   He is receiving narcotics from other offi

## 2017-11-13 ENCOUNTER — OFFICE VISIT (OUTPATIENT)
Dept: SURGERY | Facility: CLINIC | Age: 52
End: 2017-11-13

## 2017-11-13 VITALS
SYSTOLIC BLOOD PRESSURE: 150 MMHG | HEIGHT: 74 IN | HEART RATE: 96 BPM | DIASTOLIC BLOOD PRESSURE: 92 MMHG | WEIGHT: 195 LBS | BODY MASS INDEX: 25.03 KG/M2 | RESPIRATION RATE: 16 BRPM

## 2017-11-13 DIAGNOSIS — G89.4 CHRONIC PAIN SYNDROME: Primary | ICD-10-CM

## 2017-11-13 PROCEDURE — 99214 OFFICE O/P EST MOD 30 MIN: CPT | Performed by: NEUROLOGICAL SURGERY

## 2017-11-13 RX ORDER — FLUOXETINE HYDROCHLORIDE 20 MG/1
CAPSULE ORAL
Refills: 2 | COMMUNITY
Start: 2017-10-24 | End: 2018-01-02 | Stop reason: DRUGHIGH

## 2017-11-13 RX ORDER — BUSPIRONE HYDROCHLORIDE 10 MG/1
TABLET ORAL
Refills: 1 | COMMUNITY
Start: 2017-10-21 | End: 2018-01-02 | Stop reason: DRUGHIGH

## 2017-11-13 NOTE — PATIENT INSTRUCTIONS
Refill policies:    • Allow 2-3 business days for refills; controlled substances may take longer.   • Contact your pharmacy at least 5 days prior to running out of medication and have them send an electronic request or submit request through the [facility] have a procedure or additional testing performed. [facility]) will contact your insurance carrier to obtain pre-certification or prior authorization.     Unfortunately, [facility]NAK has seen an increase in denial of payment even though the p

## 2017-11-14 ENCOUNTER — HOSPITAL ENCOUNTER (OUTPATIENT)
Facility: HOSPITAL | Age: 52
Setting detail: HOSPITAL OUTPATIENT SURGERY
Discharge: HOME | End: 2017-11-14
Attending: INTERNAL MEDICINE | Admitting: INTERNAL MEDICINE
Payer: MEDICAID

## 2017-11-14 ENCOUNTER — SURGERY (OUTPATIENT)
Age: 52
End: 2017-11-14

## 2017-11-14 VITALS
WEIGHT: 195 LBS | HEIGHT: 74 IN | OXYGEN SATURATION: 97 % | DIASTOLIC BLOOD PRESSURE: 74 MMHG | RESPIRATION RATE: 18 BRPM | HEART RATE: 92 BPM | BODY MASS INDEX: 25.03 KG/M2 | SYSTOLIC BLOOD PRESSURE: 119 MMHG

## 2017-11-14 DIAGNOSIS — Z12.11 ENCOUNTER FOR SCREENING FOR MALIGNANT NEOPLASM OF COLON: ICD-10-CM

## 2017-11-14 DIAGNOSIS — I86.4: ICD-10-CM

## 2017-11-14 DIAGNOSIS — K86.0 ALCOHOL-INDUCED CHRONIC PANCREATITIS (HCC): ICD-10-CM

## 2017-11-14 DIAGNOSIS — I82.890 SPLENIC VEIN THROMBOSIS: ICD-10-CM

## 2017-11-14 PROCEDURE — 82962 GLUCOSE BLOOD TEST: CPT

## 2017-11-14 PROCEDURE — 0DJD8ZZ INSPECTION OF LOWER INTESTINAL TRACT, VIA NATURAL OR ARTIFICIAL OPENING ENDOSCOPIC: ICD-10-PCS | Performed by: INTERNAL MEDICINE

## 2017-11-14 PROCEDURE — 0DB48ZX EXCISION OF ESOPHAGOGASTRIC JUNCTION, VIA NATURAL OR ARTIFICIAL OPENING ENDOSCOPIC, DIAGNOSTIC: ICD-10-PCS | Performed by: INTERNAL MEDICINE

## 2017-11-14 PROCEDURE — 0DB28ZX EXCISION OF MIDDLE ESOPHAGUS, VIA NATURAL OR ARTIFICIAL OPENING ENDOSCOPIC, DIAGNOSTIC: ICD-10-PCS | Performed by: INTERNAL MEDICINE

## 2017-11-14 PROCEDURE — 88305 TISSUE EXAM BY PATHOLOGIST: CPT | Performed by: INTERNAL MEDICINE

## 2017-11-14 PROCEDURE — 88312 SPECIAL STAINS GROUP 1: CPT | Performed by: INTERNAL MEDICINE

## 2017-11-14 RX ORDER — SODIUM CHLORIDE 0.9 % (FLUSH) 0.9 %
10 SYRINGE (ML) INJECTION AS NEEDED
Status: DISCONTINUED | OUTPATIENT
Start: 2017-11-14 | End: 2017-11-14

## 2017-11-14 RX ORDER — MIDAZOLAM HYDROCHLORIDE 1 MG/ML
1 INJECTION INTRAMUSCULAR; INTRAVENOUS EVERY 5 MIN PRN
Status: DISCONTINUED | OUTPATIENT
Start: 2017-11-14 | End: 2017-11-14

## 2017-11-14 RX ORDER — SODIUM CHLORIDE, SODIUM LACTATE, POTASSIUM CHLORIDE, CALCIUM CHLORIDE 600; 310; 30; 20 MG/100ML; MG/100ML; MG/100ML; MG/100ML
INJECTION, SOLUTION INTRAVENOUS CONTINUOUS
Status: DISCONTINUED | OUTPATIENT
Start: 2017-11-14 | End: 2017-11-14

## 2017-11-14 RX ORDER — HYDROCODONE BITARTRATE AND ACETAMINOPHEN 10; 325 MG/1; MG/1
1 TABLET ORAL EVERY 6 HOURS PRN
COMMUNITY
End: 2018-01-02

## 2017-11-14 NOTE — H&P
PRE-PROCEDURE UPDATE    HPI: Oralia Gan is a 49year old male. 11/16/1965. Patient presents for a colonoscopy and gastroscopy.      ALLERGIES:   Penicillin G Benzat*        Comment:Redness and swelling in the hands      No current outpatient prescr

## 2017-11-14 NOTE — OPERATIVE REPORT
COLONOSCOPY AND ESOPHAGOGASTRODUODENOSCOPY REPORT    Patient Name:  Laila Mitchell Record #: [id number]  YOB: 1965  Date of Procedure: 11/14/2017    Referring physician: Sinan Gentile    Surgeon: Stephan Torres MD    Pre-op gastric contents and no outlet obstruction. There was no evidence of gastritis, ulcers or erosions. There were no varices but mild portal hypertensive gastropathy seen in body.    The duodenum was normal with no erosions and with a normal fold pattern by

## 2017-11-15 ENCOUNTER — TELEPHONE (OUTPATIENT)
Dept: SURGERY | Facility: CLINIC | Age: 52
End: 2017-11-15

## 2017-11-15 DIAGNOSIS — M50.30 DDD (DEGENERATIVE DISC DISEASE), CERVICAL: ICD-10-CM

## 2017-11-15 DIAGNOSIS — Z98.890 HISTORY OF CERVICAL SPINAL SURGERY: ICD-10-CM

## 2017-11-15 DIAGNOSIS — M48.02 NEURAL FORAMINAL STENOSIS OF CERVICAL SPINE: ICD-10-CM

## 2017-11-15 DIAGNOSIS — M54.12 CERVICAL RADICULOPATHY: Primary | ICD-10-CM

## 2017-11-15 NOTE — TELEPHONE ENCOUNTER
Patient needs written referral indicating him to Dr. Rylan Hoang at [address] two office visits, all MRI reports. Last office visit not dictated as of today.

## 2017-11-15 NOTE — PROGRESS NOTES
11/15/2017  [address] [place] [phone]    Dear Robert Lindsay,       Here are the biopsy/pathology findings from your recent EGD (Upper  Endoscopy):    Esophageal infection with candida (fungal)    Follow-up information:    A medication

## 2017-11-15 NOTE — TELEPHONE ENCOUNTER
Patient informed of the below information. Informed patient to call our office back if he had additional questions.     Per Eri Byrnes:  \"Dr Abdoul Murray said you can refer him to [facility]      [phone]   Tamika Barrera [address],

## 2017-11-21 NOTE — TELEPHONE ENCOUNTER
Pt calling to request last otwo office notes and imaging report be faxed. Referral was only received. Informed pt that ofc notes from 11/13 are not yet completed and will fax once available. Faxed ofc notes dated 6/26 and imaging reports.  Confirmation rece

## 2017-11-27 NOTE — PROGRESS NOTES
[facility]   Outpatient Neurological Surgery Follow Up    Nakiarowdy Mohan  : 1965  PCP: Spring Martin  Referring Provider: No ref. provider found    REASON FOR VISIT:Patient presents with:   Follow - Up: on pain been receiving large amounts of narcotics every other week for at least a year. The patient reports several years of being on narcotic analgesia. The patient is a poorly controlled diabetic.   I think much of his pain as a result of being on chronic pain

## 2017-12-04 ENCOUNTER — CHARTING TRANS (OUTPATIENT)
Dept: OTHER | Age: 52
End: 2017-12-04

## 2018-01-02 PROBLEM — K86.1 CHRONIC PANCREATITIS, UNSPECIFIED PANCREATITIS TYPE (HCC): Status: ACTIVE | Noted: 2018-01-02

## 2018-01-10 PROBLEM — R55 SYNCOPE AND COLLAPSE: Status: RESOLVED | Noted: 2017-10-23 | Resolved: 2018-01-10

## 2018-01-10 PROBLEM — R07.9 CHEST PAIN, RULE OUT ACUTE MYOCARDIAL INFARCTION: Status: RESOLVED | Noted: 2017-08-22 | Resolved: 2018-01-10

## 2018-01-10 PROBLEM — R19.7 DIARRHEA: Status: RESOLVED | Noted: 2017-08-22 | Resolved: 2018-01-10

## 2018-01-11 PROBLEM — F10.21 ALCOHOL DEPENDENCE IN REMISSION (HCC): Status: ACTIVE | Noted: 2018-01-11

## 2018-01-12 PROBLEM — R73.9 HYPERGLYCEMIA: Status: RESOLVED | Noted: 2017-01-16 | Resolved: 2018-01-12

## 2018-01-14 ENCOUNTER — HOSPITAL ENCOUNTER (EMERGENCY)
Age: 53
Discharge: HOME | End: 2018-01-14
Attending: EMERGENCY MEDICINE
Payer: MEDICAID

## 2018-01-14 VITALS
HEIGHT: 74 IN | WEIGHT: 189 LBS | OXYGEN SATURATION: 98 % | DIASTOLIC BLOOD PRESSURE: 89 MMHG | HEART RATE: 97 BPM | RESPIRATION RATE: 20 BRPM | SYSTOLIC BLOOD PRESSURE: 140 MMHG | TEMPERATURE: 98 F | BODY MASS INDEX: 24.26 KG/M2

## 2018-01-14 DIAGNOSIS — M54.5 CHRONIC LOW BACK PAIN, UNSPECIFIED BACK PAIN LATERALITY, WITH SCIATICA PRESENCE UNSPECIFIED: Primary | ICD-10-CM

## 2018-01-14 DIAGNOSIS — E10.65 TYPE 1 DIABETES MELLITUS WITH HYPERGLYCEMIA (HCC): ICD-10-CM

## 2018-01-14 DIAGNOSIS — G89.29 CHRONIC LOW BACK PAIN, UNSPECIFIED BACK PAIN LATERALITY, WITH SCIATICA PRESENCE UNSPECIFIED: Primary | ICD-10-CM

## 2018-01-14 LAB
ACETONE: NEGATIVE
ALBUMIN SERPL-MCNC: 4 G/DL (ref 3.5–4.8)
ALP LIVER SERPL-CCNC: 70 U/L (ref 45–117)
ALT SERPL-CCNC: 52 U/L (ref 17–63)
AST SERPL-CCNC: 28 U/L (ref 15–41)
BASOPHILS # BLD AUTO: 0.06 X10(3) UL (ref 0–0.1)
BASOPHILS NFR BLD AUTO: 0.7 %
BILIRUB SERPL-MCNC: 0.5 MG/DL (ref 0.1–2)
BILIRUB UR QL STRIP.AUTO: NEGATIVE
BUN BLD-MCNC: 18 MG/DL (ref 8–20)
CALCIUM BLD-MCNC: 9 MG/DL (ref 8.3–10.3)
CHLORIDE: 98 MMOL/L (ref 101–111)
CLARITY UR REFRACT.AUTO: CLEAR
CO2: 26 MMOL/L (ref 22–32)
COLOR UR AUTO: YELLOW
CREAT BLD-MCNC: 1.25 MG/DL (ref 0.7–1.3)
EOSINOPHIL # BLD AUTO: 0.02 X10(3) UL (ref 0–0.3)
EOSINOPHIL NFR BLD AUTO: 0.2 %
ERYTHROCYTE [DISTWIDTH] IN BLOOD BY AUTOMATED COUNT: 13.4 % (ref 11.5–16)
GLUCOSE BLD-MCNC: 208 MG/DL (ref 65–99)
GLUCOSE BLD-MCNC: 347 MG/DL (ref 65–99)
GLUCOSE BLD-MCNC: 461 MG/DL (ref 70–99)
GLUCOSE BLD-MCNC: 492 MG/DL (ref 65–99)
GLUCOSE UR STRIP.AUTO-MCNC: 500 MG/DL
HCT VFR BLD AUTO: 42.4 % (ref 37–53)
HGB BLD-MCNC: 14.5 G/DL (ref 13–17)
IMMATURE GRANULOCYTE COUNT: 0.03 X10(3) UL (ref 0–1)
IMMATURE GRANULOCYTE RATIO %: 0.3 %
ISTAT BLOOD GAS BASE EXCESS: 0 MMOL/L
ISTAT BLOOD GAS FIO2: 21 %
ISTAT BLOOD GAS HCO3: 25.7 MEQ/L (ref 22–26)
ISTAT BLOOD GAS O2 SATURATION: 33 % (ref 92–100)
ISTAT BLOOD GAS PCO2: 48 MMHG (ref 35–45)
ISTAT BLOOD GAS PH: 7.34 (ref 7.35–7.45)
ISTAT BLOOD GAS PO2: 22 MMHG (ref 80–105)
ISTAT BLOOD GAS TCO2: 27 MMOL/L (ref 22–32)
ISTAT PATIENT TEMPERATURE: 98.6 DEGREE
KETONES UR STRIP.AUTO-MCNC: NEGATIVE MG/DL
LEUKOCYTE ESTERASE UR QL STRIP.AUTO: NEGATIVE
LYMPHOCYTES # BLD AUTO: 1.38 X10(3) UL (ref 0.9–4)
LYMPHOCYTES NFR BLD AUTO: 16 %
M PROTEIN MFR SERPL ELPH: 7.7 G/DL (ref 6.1–8.3)
MCH RBC QN AUTO: 31 PG (ref 27–33.2)
MCHC RBC AUTO-ENTMCNC: 34.2 G/DL (ref 31–37)
MCV RBC AUTO: 90.8 FL (ref 80–99)
MONOCYTES # BLD AUTO: 0.45 X10(3) UL (ref 0.1–0.6)
MONOCYTES NFR BLD AUTO: 5.2 %
NEUTROPHIL ABS PRELIM: 6.66 X10 (3) UL (ref 1.3–6.7)
NEUTROPHILS # BLD AUTO: 6.66 X10(3) UL (ref 1.3–6.7)
NEUTROPHILS NFR BLD AUTO: 77.6 %
NITRITE UR QL STRIP.AUTO: NEGATIVE
PH UR STRIP.AUTO: 5 [PH] (ref 4.5–8)
PLATELET # BLD AUTO: 216 10(3)UL (ref 150–450)
POTASSIUM SERPL-SCNC: 4 MMOL/L (ref 3.6–5.1)
PROT UR STRIP.AUTO-MCNC: NEGATIVE MG/DL
RBC # BLD AUTO: 4.67 X10(6)UL (ref 4.3–5.7)
RBC UR QL AUTO: NEGATIVE
RED CELL DISTRIBUTION WIDTH-SD: 45.1 FL (ref 35.1–46.3)
SODIUM SERPL-SCNC: 131 MMOL/L (ref 136–144)
SP GR UR STRIP.AUTO: 1.01 (ref 1–1.03)
UROBILINOGEN UR STRIP.AUTO-MCNC: 0.2 MG/DL
WBC # BLD AUTO: 8.6 X10(3) UL (ref 4–13)

## 2018-01-14 PROCEDURE — 96374 THER/PROPH/DIAG INJ IV PUSH: CPT

## 2018-01-14 PROCEDURE — 82803 BLOOD GASES ANY COMBINATION: CPT

## 2018-01-14 PROCEDURE — 96361 HYDRATE IV INFUSION ADD-ON: CPT

## 2018-01-14 PROCEDURE — 85025 COMPLETE CBC W/AUTO DIFF WBC: CPT | Performed by: EMERGENCY MEDICINE

## 2018-01-14 PROCEDURE — 96375 TX/PRO/DX INJ NEW DRUG ADDON: CPT

## 2018-01-14 PROCEDURE — 99284 EMERGENCY DEPT VISIT MOD MDM: CPT

## 2018-01-14 PROCEDURE — 80053 COMPREHEN METABOLIC PANEL: CPT | Performed by: EMERGENCY MEDICINE

## 2018-01-14 PROCEDURE — 81003 URINALYSIS AUTO W/O SCOPE: CPT | Performed by: EMERGENCY MEDICINE

## 2018-01-14 PROCEDURE — 82962 GLUCOSE BLOOD TEST: CPT

## 2018-01-14 PROCEDURE — 82009 KETONE BODYS QUAL: CPT | Performed by: EMERGENCY MEDICINE

## 2018-01-14 RX ORDER — ONDANSETRON 2 MG/ML
4 INJECTION INTRAMUSCULAR; INTRAVENOUS ONCE
Status: COMPLETED | OUTPATIENT
Start: 2018-01-14 | End: 2018-01-14

## 2018-01-14 RX ORDER — MORPHINE SULFATE 10 MG/ML
10 INJECTION, SOLUTION INTRAMUSCULAR; INTRAVENOUS ONCE
Status: COMPLETED | OUTPATIENT
Start: 2018-01-14 | End: 2018-01-14

## 2018-01-14 NOTE — ED INITIAL ASSESSMENT (HPI)
Had spinal cortizone injection Friday at [facility] blood sugars have been elevated since, this morning 564

## 2018-01-14 NOTE — ED PROVIDER NOTES
Patient Seen in: [facility] In [place]    History   Patient presents with:  Hyperglycemia (metabolic)    Stated Complaint: hyperglycemia after receiving cortisone injection, has insulin pump    HPI    71-year-old male presents to the Procedure: COLONOSCOPY;  Surgeon: Jenny Che[place] MD;  Location: [address] [place] [address] ENDOSCOPY  11/14/2017: EGD  2016: OTHER      Comment: CERVICAL SURGERY  10/12: OTHER SURGICAL HISTORY      Comment: left knee scoped  8/2016: SPINE SURGERY PROCEDURE UNL He has a normal mood and affect. Nursing note and vitals reviewed.            ED Course     Labs Reviewed   COMP METABOLIC PANEL (14) - Abnormal; Notable for the following:        Result Value    Glucose 461 (*)     Sodium 131 (*)     Chloride 98 (*) decrease. After an hour and was still elevated but coming down nicely. He gave himself another bolus of insulin and he received another liter of fluid. He was evaluated again and his blood sugar came down to the 200s. He has no ketones.   He is not acid

## 2018-01-14 NOTE — ED NOTES
i-STAT Testing  Site: vein - line draw  Time 0734  Navjot's test result  N/A  Clinical data   VBG  Results given to Dr Margarito Hill and SHARLENE

## 2018-01-16 ENCOUNTER — HOSPITAL ENCOUNTER (EMERGENCY)
Facility: HOSPITAL | Age: 53
Discharge: HOME | End: 2018-01-16
Attending: EMERGENCY MEDICINE
Payer: MEDICAID

## 2018-01-16 ENCOUNTER — APPOINTMENT (OUTPATIENT)
Dept: GENERAL RADIOLOGY | Facility: HOSPITAL | Age: 53
End: 2018-01-16
Attending: EMERGENCY MEDICINE
Payer: MEDICAID

## 2018-01-16 ENCOUNTER — APPOINTMENT (OUTPATIENT)
Dept: MRI IMAGING | Facility: HOSPITAL | Age: 53
End: 2018-01-16
Attending: EMERGENCY MEDICINE
Payer: MEDICAID

## 2018-01-16 VITALS
HEART RATE: 96 BPM | DIASTOLIC BLOOD PRESSURE: 85 MMHG | OXYGEN SATURATION: 96 % | TEMPERATURE: 98 F | RESPIRATION RATE: 15 BRPM | HEIGHT: 74 IN | SYSTOLIC BLOOD PRESSURE: 124 MMHG | WEIGHT: 190 LBS | BODY MASS INDEX: 24.38 KG/M2

## 2018-01-16 DIAGNOSIS — E10.65 TYPE 1 DIABETES MELLITUS WITH HYPERGLYCEMIA (HCC): Primary | ICD-10-CM

## 2018-01-16 LAB
ALBUMIN SERPL-MCNC: 4 G/DL (ref 3.5–4.8)
ALLENS TEST: POSITIVE
ALP LIVER SERPL-CCNC: 60 U/L (ref 45–117)
ALT SERPL-CCNC: 39 U/L (ref 17–63)
ARTERIAL BLD GAS O2 SATURATION: 90 % (ref 92–100)
ARTERIAL BLOOD GAS BASE EXCESS: -0.4
ARTERIAL BLOOD GAS HCO3: 24.6 MEQ/L (ref 22–26)
ARTERIAL BLOOD GAS PCO2: 42 MM HG (ref 35–45)
ARTERIAL BLOOD GAS PH: 7.39 (ref 7.35–7.45)
ARTERIAL BLOOD GAS PO2: 71 MM HG (ref 80–105)
AST SERPL-CCNC: 18 U/L (ref 15–41)
BASOPHILS # BLD AUTO: 0.1 X10(3) UL (ref 0–0.1)
BASOPHILS NFR BLD AUTO: 1.1 %
BILIRUB SERPL-MCNC: 0.3 MG/DL (ref 0.1–2)
BILIRUB UR QL STRIP.AUTO: NEGATIVE
BUN BLD-MCNC: 19 MG/DL (ref 8–20)
CALCIUM BLD-MCNC: 9.3 MG/DL (ref 8.3–10.3)
CALCULATED O2 SATURATION: 94 % (ref 92–100)
CARBOXYHEMOGLOBIN: 4.7 % SAT (ref 0–3)
CHLORIDE: 103 MMOL/L (ref 101–111)
CLARITY UR REFRACT.AUTO: CLEAR
CO2: 21 MMOL/L (ref 22–32)
COLOR UR AUTO: YELLOW
CREAT BLD-MCNC: 0.98 MG/DL (ref 0.7–1.3)
EOSINOPHIL # BLD AUTO: 0.09 X10(3) UL (ref 0–0.3)
EOSINOPHIL NFR BLD AUTO: 1 %
ERYTHROCYTE [DISTWIDTH] IN BLOOD BY AUTOMATED COUNT: 13.1 % (ref 11.5–16)
FIO2: 21 %
GLUCOSE BLD-MCNC: 115 MG/DL (ref 65–99)
GLUCOSE BLD-MCNC: 217 MG/DL (ref 65–99)
GLUCOSE BLD-MCNC: 219 MG/DL (ref 70–99)
GLUCOSE UR STRIP.AUTO-MCNC: 150 MG/DL
HCT VFR BLD AUTO: 42.2 % (ref 37–53)
HGB BLD-MCNC: 14.8 G/DL (ref 13–17)
IMMATURE GRANULOCYTE COUNT: 0.03 X10(3) UL (ref 0–1)
IMMATURE GRANULOCYTE RATIO %: 0.3 %
IONIZED CALCIUM: 1.21 MMOL/L (ref 1.12–1.32)
KETONES UR STRIP.AUTO-MCNC: 20 MG/DL
LACTIC ACID ARTERIAL: <1.3 MMOL/L (ref 0.5–2)
LEUKOCYTE ESTERASE UR QL STRIP.AUTO: NEGATIVE
LYMPHOCYTES # BLD AUTO: 2.05 X10(3) UL (ref 0.9–4)
LYMPHOCYTES NFR BLD AUTO: 21.8 %
M PROTEIN MFR SERPL ELPH: 7.6 G/DL (ref 6.1–8.3)
MCH RBC QN AUTO: 31.2 PG (ref 27–33.2)
MCHC RBC AUTO-ENTMCNC: 35.1 G/DL (ref 31–37)
MCV RBC AUTO: 88.8 FL (ref 80–99)
METHEMOGLOBIN: 0.4 % SAT (ref 0.4–1.5)
MONOCYTES # BLD AUTO: 0.64 X10(3) UL (ref 0.1–0.6)
MONOCYTES NFR BLD AUTO: 6.8 %
NEUTROPHIL ABS PRELIM: 6.49 X10 (3) UL (ref 1.3–6.7)
NEUTROPHILS # BLD AUTO: 6.49 X10(3) UL (ref 1.3–6.7)
NEUTROPHILS NFR BLD AUTO: 69 %
NITRITE UR QL STRIP.AUTO: NEGATIVE
P/F RATIO: 341.1 MMHG
PATIENT TEMPERATURE: 98.5 F
PH UR STRIP.AUTO: 5 [PH] (ref 4.5–8)
PLATELET # BLD AUTO: 228 10(3)UL (ref 150–450)
POTASSIUM BLOOD GAS: 3.5 MMOL/L (ref 3.6–5.1)
POTASSIUM SERPL-SCNC: 3.9 MMOL/L (ref 3.6–5.1)
PROT UR STRIP.AUTO-MCNC: NEGATIVE MG/DL
RBC # BLD AUTO: 4.75 X10(6)UL (ref 4.3–5.7)
RBC UR QL AUTO: NEGATIVE
RED CELL DISTRIBUTION WIDTH-SD: 42.7 FL (ref 35.1–46.3)
SODIUM BLOOD GAS: 136 MMOL/L (ref 136–144)
SODIUM SERPL-SCNC: 135 MMOL/L (ref 136–144)
SP GR UR STRIP.AUTO: 1.03 (ref 1–1.03)
TOTAL HEMOGLOBIN: 13.5 G/DL (ref 13.2–17.3)
UROBILINOGEN UR STRIP.AUTO-MCNC: <2 MG/DL
WBC # BLD AUTO: 9.4 X10(3) UL (ref 4–13)

## 2018-01-16 PROCEDURE — 85025 COMPLETE CBC W/AUTO DIFF WBC: CPT | Performed by: EMERGENCY MEDICINE

## 2018-01-16 PROCEDURE — A9575 INJ GADOTERATE MEGLUMI 0.1ML: HCPCS | Performed by: EMERGENCY MEDICINE

## 2018-01-16 PROCEDURE — 84132 ASSAY OF SERUM POTASSIUM: CPT | Performed by: EMERGENCY MEDICINE

## 2018-01-16 PROCEDURE — 82803 BLOOD GASES ANY COMBINATION: CPT | Performed by: EMERGENCY MEDICINE

## 2018-01-16 PROCEDURE — 82962 GLUCOSE BLOOD TEST: CPT

## 2018-01-16 PROCEDURE — 96374 THER/PROPH/DIAG INJ IV PUSH: CPT

## 2018-01-16 PROCEDURE — 80053 COMPREHEN METABOLIC PANEL: CPT | Performed by: EMERGENCY MEDICINE

## 2018-01-16 PROCEDURE — 84295 ASSAY OF SERUM SODIUM: CPT | Performed by: EMERGENCY MEDICINE

## 2018-01-16 PROCEDURE — 99285 EMERGENCY DEPT VISIT HI MDM: CPT

## 2018-01-16 PROCEDURE — 87040 BLOOD CULTURE FOR BACTERIA: CPT | Performed by: EMERGENCY MEDICINE

## 2018-01-16 PROCEDURE — 36600 WITHDRAWAL OF ARTERIAL BLOOD: CPT | Performed by: EMERGENCY MEDICINE

## 2018-01-16 PROCEDURE — 82375 ASSAY CARBOXYHB QUANT: CPT | Performed by: EMERGENCY MEDICINE

## 2018-01-16 PROCEDURE — 83050 HGB METHEMOGLOBIN QUAN: CPT | Performed by: EMERGENCY MEDICINE

## 2018-01-16 PROCEDURE — 82330 ASSAY OF CALCIUM: CPT | Performed by: EMERGENCY MEDICINE

## 2018-01-16 PROCEDURE — 96361 HYDRATE IV INFUSION ADD-ON: CPT

## 2018-01-16 PROCEDURE — 72156 MRI NECK SPINE W/O & W/DYE: CPT | Performed by: EMERGENCY MEDICINE

## 2018-01-16 PROCEDURE — 36415 COLL VENOUS BLD VENIPUNCTURE: CPT

## 2018-01-16 PROCEDURE — 83605 ASSAY OF LACTIC ACID: CPT | Performed by: EMERGENCY MEDICINE

## 2018-01-16 PROCEDURE — 85018 HEMOGLOBIN: CPT | Performed by: EMERGENCY MEDICINE

## 2018-01-16 PROCEDURE — 96376 TX/PRO/DX INJ SAME DRUG ADON: CPT

## 2018-01-16 PROCEDURE — 71046 X-RAY EXAM CHEST 2 VIEWS: CPT | Performed by: EMERGENCY MEDICINE

## 2018-01-16 PROCEDURE — 81003 URINALYSIS AUTO W/O SCOPE: CPT | Performed by: EMERGENCY MEDICINE

## 2018-01-16 RX ORDER — HYDROMORPHONE HYDROCHLORIDE 1 MG/ML
1 INJECTION, SOLUTION INTRAMUSCULAR; INTRAVENOUS; SUBCUTANEOUS ONCE
Status: COMPLETED | OUTPATIENT
Start: 2018-01-16 | End: 2018-01-16

## 2018-01-16 RX ORDER — HYDROMORPHONE HYDROCHLORIDE 1 MG/ML
INJECTION, SOLUTION INTRAMUSCULAR; INTRAVENOUS; SUBCUTANEOUS
Status: COMPLETED
Start: 2018-01-16 | End: 2018-01-16

## 2018-01-16 RX ORDER — SODIUM CHLORIDE 9 MG/ML
INJECTION, SOLUTION INTRAVENOUS ONCE
Status: COMPLETED | OUTPATIENT
Start: 2018-01-16 | End: 2018-01-16

## 2018-01-16 RX ORDER — HYDROMORPHONE HYDROCHLORIDE 1 MG/ML
1 INJECTION, SOLUTION INTRAMUSCULAR; INTRAVENOUS; SUBCUTANEOUS ONCE
Status: DISCONTINUED | OUTPATIENT
Start: 2018-01-16 | End: 2018-01-16

## 2018-01-16 NOTE — ED NOTES
Pt awake and alert, appears comfortable. Pt c/o increasing blood sugars, abdominal pain, generalized weakness and neck pain since Saturday.  Pt states neck pain is worse since receiving a steriod injection from pain management doctor at [facility] [place] [address]

## 2018-01-16 NOTE — ED NOTES
Pt appears comfortable, requesting pain medication for neck back and shoulder pain. Pt states he normally takes norco 10's but last had this morning.

## 2018-01-17 NOTE — ED NOTES
Unable to print AVS after Epic hard stops corrected. Charge nurse, Leandro Sutherland, aware. Patient given follow-up doc information and states will check instructions on My Chart. Ambulated to door with family without difficulty.

## 2018-01-17 NOTE — ED NOTES
RT notified of need for ABG. Pt appears comfortable on cart and states he is feeling better. MRI screening form completed at this time, family at bedside.

## 2018-01-17 NOTE — ED NOTES
No answer at MRI at this time. To recall shortly. Pt states his neck pain is normally 3/10 so pt informed this should be our goal for treatment. Pt remains awake and alert, skin still flushed, warm and dry, resps reg/unlabored.  Pt appears comfortable and i

## 2018-01-17 NOTE — ED PROVIDER NOTES
Patient Seen in: [facility] Emergency Department    History   Patient presents with: Other    Stated Complaint: shoulder pain, back pain,. Pt states He feels like hes going into DKA.  Pt has m*    HPI    45-year-old white male presents emergency kayden fusion L5-S1  10/30/16: CATARACT      Comment: right eye  11/14/2017: COLONOSCOPY N/A      Comment: Procedure: COLONOSCOPY;  Surgeon: Cyndee Bucio MD;  Location: [address] [place] [address] ENDOSCOPY  11/14/2017: EGD  2016: OTHER      Comment: CERVICAL SURGER without murmur gallop or rub    Abdomen is soft nondistended nontender to deep palpation there is no rebound or guarding noted no hepatosplenomegaly is noted. No masses are noted. No hernias are palpated.     Extremity exam reveals no clubbing cyanosis or Please view results for these tests on the individual orders.    RAINBOW DRAW BLUE   RAINBOW DRAW LAVENDER   RAINBOW DRAW LIGHT GREEN   RAINBOW DRAW GOLD   BLOOD CULTURE   BLOOD CULTURE   MRI of the cervical spine revealed:  FINDINGS:       Minim 2202  ------------------------------------------------------------       MDM   Patient had an IV established here in the emergency was given IV foods and had laboratory studies sent.   Laboratory workup does reveal elevated blood sugar but it does not appea

## 2018-01-17 NOTE — ED NOTES
Per MD, pt is to remain NPO, would like to r/o abscess with MRI from pt recent steroid injection. Pt made aware. MD attempting to void via urinal at this time. Pt denies dizziness or lightheaded.

## 2018-01-17 NOTE — ED NOTES
Pt awake and alert, appears comfortable, states he is feeling better. pt would like water.  To discuss with MD.

## 2018-01-27 ENCOUNTER — APPOINTMENT (OUTPATIENT)
Dept: GENERAL RADIOLOGY | Age: 53
End: 2018-01-27
Attending: EMERGENCY MEDICINE
Payer: MEDICAID

## 2018-01-27 ENCOUNTER — HOSPITAL ENCOUNTER (EMERGENCY)
Age: 53
Discharge: HOME | End: 2018-01-27
Attending: EMERGENCY MEDICINE
Payer: MEDICAID

## 2018-01-27 VITALS
OXYGEN SATURATION: 97 % | DIASTOLIC BLOOD PRESSURE: 67 MMHG | HEART RATE: 90 BPM | TEMPERATURE: 98 F | WEIGHT: 190 LBS | HEIGHT: 74 IN | SYSTOLIC BLOOD PRESSURE: 122 MMHG | BODY MASS INDEX: 24.38 KG/M2 | RESPIRATION RATE: 18 BRPM

## 2018-01-27 DIAGNOSIS — E86.0 DEHYDRATION: ICD-10-CM

## 2018-01-27 DIAGNOSIS — E10.65 TYPE 1 DIABETES MELLITUS WITH HYPERGLYCEMIA (HCC): Primary | ICD-10-CM

## 2018-01-27 LAB
ALBUMIN SERPL-MCNC: 3.9 G/DL (ref 3.5–4.8)
ALP LIVER SERPL-CCNC: 80 U/L (ref 45–117)
ALT SERPL-CCNC: 34 U/L (ref 17–63)
AST SERPL-CCNC: 18 U/L (ref 15–41)
BASOPHILS # BLD AUTO: 0.06 X10(3) UL (ref 0–0.1)
BASOPHILS NFR BLD AUTO: 0.7 %
BILIRUB SERPL-MCNC: 0.3 MG/DL (ref 0.1–2)
BILIRUB UR QL STRIP.AUTO: NEGATIVE
BUN BLD-MCNC: 21 MG/DL (ref 8–20)
CALCIUM BLD-MCNC: 8.7 MG/DL (ref 8.3–10.3)
CHLORIDE: 103 MMOL/L (ref 101–111)
CLARITY UR REFRACT.AUTO: CLEAR
CO2: 26 MMOL/L (ref 22–32)
COLOR UR AUTO: YELLOW
CREAT BLD-MCNC: 1.29 MG/DL (ref 0.7–1.3)
EOSINOPHIL # BLD AUTO: 0.27 X10(3) UL (ref 0–0.3)
EOSINOPHIL NFR BLD AUTO: 3.1 %
ERYTHROCYTE [DISTWIDTH] IN BLOOD BY AUTOMATED COUNT: 13.6 % (ref 11.5–16)
GLUCOSE BLD-MCNC: 186 MG/DL (ref 65–99)
GLUCOSE BLD-MCNC: 277 MG/DL (ref 65–99)
GLUCOSE BLD-MCNC: 287 MG/DL (ref 70–99)
GLUCOSE UR STRIP.AUTO-MCNC: >=1000 MG/DL
HCT VFR BLD AUTO: 41.3 % (ref 37–53)
HGB BLD-MCNC: 13.9 G/DL (ref 13–17)
IMMATURE GRANULOCYTE COUNT: 0.03 X10(3) UL (ref 0–1)
IMMATURE GRANULOCYTE RATIO %: 0.3 %
LEUKOCYTE ESTERASE UR QL STRIP.AUTO: NEGATIVE
LYMPHOCYTES # BLD AUTO: 2.37 X10(3) UL (ref 0.9–4)
LYMPHOCYTES NFR BLD AUTO: 26.9 %
M PROTEIN MFR SERPL ELPH: 7 G/DL (ref 6.1–8.3)
MCH RBC QN AUTO: 31.2 PG (ref 27–33.2)
MCHC RBC AUTO-ENTMCNC: 33.7 G/DL (ref 31–37)
MCV RBC AUTO: 92.6 FL (ref 80–99)
MONOCYTES # BLD AUTO: 0.87 X10(3) UL (ref 0.1–0.6)
MONOCYTES NFR BLD AUTO: 9.9 %
NEUTROPHIL ABS PRELIM: 5.22 X10 (3) UL (ref 1.3–6.7)
NEUTROPHILS # BLD AUTO: 5.22 X10(3) UL (ref 1.3–6.7)
NEUTROPHILS NFR BLD AUTO: 59.1 %
NITRITE UR QL STRIP.AUTO: NEGATIVE
PH UR STRIP.AUTO: 5.5 [PH] (ref 4.5–8)
PLATELET # BLD AUTO: 235 10(3)UL (ref 150–450)
POTASSIUM SERPL-SCNC: 4.1 MMOL/L (ref 3.6–5.1)
PROT UR STRIP.AUTO-MCNC: NEGATIVE MG/DL
RBC # BLD AUTO: 4.46 X10(6)UL (ref 4.3–5.7)
RBC UR QL AUTO: NEGATIVE
RED CELL DISTRIBUTION WIDTH-SD: 46.5 FL (ref 35.1–46.3)
SODIUM SERPL-SCNC: 136 MMOL/L (ref 136–144)
SP GR UR STRIP.AUTO: 1.02 (ref 1–1.03)
UROBILINOGEN UR STRIP.AUTO-MCNC: 0.2 MG/DL
WBC # BLD AUTO: 8.8 X10(3) UL (ref 4–13)

## 2018-01-27 PROCEDURE — 80053 COMPREHEN METABOLIC PANEL: CPT | Performed by: EMERGENCY MEDICINE

## 2018-01-27 PROCEDURE — 71046 X-RAY EXAM CHEST 2 VIEWS: CPT | Performed by: EMERGENCY MEDICINE

## 2018-01-27 PROCEDURE — 93005 ELECTROCARDIOGRAM TRACING: CPT

## 2018-01-27 PROCEDURE — 96361 HYDRATE IV INFUSION ADD-ON: CPT

## 2018-01-27 PROCEDURE — 81003 URINALYSIS AUTO W/O SCOPE: CPT | Performed by: EMERGENCY MEDICINE

## 2018-01-27 PROCEDURE — 96374 THER/PROPH/DIAG INJ IV PUSH: CPT

## 2018-01-27 PROCEDURE — 85025 COMPLETE CBC W/AUTO DIFF WBC: CPT | Performed by: EMERGENCY MEDICINE

## 2018-01-27 PROCEDURE — 93010 ELECTROCARDIOGRAM REPORT: CPT

## 2018-01-27 PROCEDURE — 99285 EMERGENCY DEPT VISIT HI MDM: CPT

## 2018-01-27 PROCEDURE — 82962 GLUCOSE BLOOD TEST: CPT

## 2018-01-27 RX ORDER — MORPHINE SULFATE 4 MG/ML
4 INJECTION, SOLUTION INTRAMUSCULAR; INTRAVENOUS ONCE
Status: COMPLETED | OUTPATIENT
Start: 2018-01-27 | End: 2018-01-27

## 2018-01-28 NOTE — ED INITIAL ASSESSMENT (HPI)
Pt presents to the ED with elevated blood sugars that have been ongoing since a steroid injection in his back 8 days PTA.

## 2018-01-28 NOTE — ED PROVIDER NOTES
Patient Seen in: [facility] In [place]    History   Patient presents with:  Hyperglycemia (metabolic)  Hypotension (cardiovascular)  Dizziness (neurologic)    Stated Complaint: dizziness, blood sugars high and low both, low blood pres N/A      Comment: Procedure: COLONOSCOPY;  Surgeon: Trever Chand MD;  Location: [facility] ENDOSCOPY  11/14/2017: EGD  2016: OTHER      Comment: CERVICAL SURGERY  10/12: OTHER SURGICAL HISTORY      Comment: left knee scoped  8/2016: SPINE SULEMA deficit. Mild tremors noted. Patient has some generalized weakness without any lateralization or focal features. Skin: Skin is warm and dry. No rash noted. Nursing note and vitals reviewed.            ED Course     Labs Reviewed   COMP METABOLIC PANE (Reviewed)  ------------------------------------------------------------  Time: 01/27 1953  Value: CREATININE: 1.29  Comment: Slightly higher vs 13 days ago.  ------------------------------------------------------------  Time: 01/27 1954  Value: Pulse: 104 diagnosis)  Dehydration    Disposition:  Discharge  1/27/2018  8:42 pm    Follow-up:  Katie Ibrahim MD  [place] [id number] [facility] [phone]    In 2 days      Leslie Mcpherson MD  [address]  [phone]

## 2018-01-29 LAB
ATRIAL RATE: 112 BPM
P AXIS: 34 DEGREES
P-R INTERVAL: 134 MS
Q-T INTERVAL: 324 MS
QRS DURATION: 82 MS
QTC CALCULATION (BEZET): 442 MS
R AXIS: 61 DEGREES
T AXIS: 41 DEGREES
VENTRICULAR RATE: 112 BPM

## 2018-02-09 ENCOUNTER — APPOINTMENT (OUTPATIENT)
Dept: CT IMAGING | Age: 53
End: 2018-02-09
Attending: PHYSICIAN ASSISTANT
Payer: MEDICAID

## 2018-02-09 ENCOUNTER — HOSPITAL ENCOUNTER (EMERGENCY)
Age: 53
Discharge: HOME | End: 2018-02-09
Attending: EMERGENCY MEDICINE
Payer: MEDICAID

## 2018-02-09 ENCOUNTER — APPOINTMENT (OUTPATIENT)
Dept: GENERAL RADIOLOGY | Age: 53
End: 2018-02-09
Attending: PHYSICIAN ASSISTANT
Payer: MEDICAID

## 2018-02-09 VITALS
HEIGHT: 74 IN | BODY MASS INDEX: 24.38 KG/M2 | SYSTOLIC BLOOD PRESSURE: 118 MMHG | RESPIRATION RATE: 16 BRPM | WEIGHT: 190 LBS | OXYGEN SATURATION: 99 % | DIASTOLIC BLOOD PRESSURE: 74 MMHG | TEMPERATURE: 98 F | HEART RATE: 87 BPM

## 2018-02-09 DIAGNOSIS — R19.7 NAUSEA VOMITING AND DIARRHEA: ICD-10-CM

## 2018-02-09 DIAGNOSIS — M54.5 CHRONIC LOW BACK PAIN, UNSPECIFIED BACK PAIN LATERALITY, WITH SCIATICA PRESENCE UNSPECIFIED: ICD-10-CM

## 2018-02-09 DIAGNOSIS — G89.29 CHRONIC LOW BACK PAIN, UNSPECIFIED BACK PAIN LATERALITY, WITH SCIATICA PRESENCE UNSPECIFIED: ICD-10-CM

## 2018-02-09 DIAGNOSIS — K86.1 CHRONIC PANCREATITIS, UNSPECIFIED PANCREATITIS TYPE (HCC): Primary | ICD-10-CM

## 2018-02-09 DIAGNOSIS — R11.2 NAUSEA VOMITING AND DIARRHEA: ICD-10-CM

## 2018-02-09 LAB
ALBUMIN SERPL-MCNC: 3.9 G/DL (ref 3.5–4.8)
ALP LIVER SERPL-CCNC: 62 U/L (ref 45–117)
ALT SERPL-CCNC: 43 U/L (ref 17–63)
AST SERPL-CCNC: 25 U/L (ref 15–41)
BASOPHILS # BLD AUTO: 0.01 X10(3) UL (ref 0–0.1)
BASOPHILS NFR BLD AUTO: 0.2 %
BILIRUB SERPL-MCNC: 0.8 MG/DL (ref 0.1–2)
BUN BLD-MCNC: 22 MG/DL (ref 8–20)
CALCIUM BLD-MCNC: 8.9 MG/DL (ref 8.3–10.3)
CHLORIDE: 96 MMOL/L (ref 101–111)
CO2: 27 MMOL/L (ref 22–32)
CREAT BLD-MCNC: 1.3 MG/DL (ref 0.7–1.3)
EOSINOPHIL # BLD AUTO: 0 X10(3) UL (ref 0–0.3)
EOSINOPHIL NFR BLD AUTO: 0 %
ERYTHROCYTE [DISTWIDTH] IN BLOOD BY AUTOMATED COUNT: 13.3 % (ref 11.5–16)
GLUCOSE BLD-MCNC: 219 MG/DL (ref 65–99)
GLUCOSE BLD-MCNC: 331 MG/DL (ref 70–99)
GLUCOSE BLD-MCNC: 355 MG/DL (ref 65–99)
HCT VFR BLD AUTO: 45.4 % (ref 37–53)
HGB BLD-MCNC: 15.5 G/DL (ref 13–17)
IMMATURE GRANULOCYTE COUNT: 0.02 X10(3) UL (ref 0–1)
IMMATURE GRANULOCYTE RATIO %: 0.3 %
LIPASE: 35 U/L (ref 73–393)
LYMPHOCYTES # BLD AUTO: 1.1 X10(3) UL (ref 0.9–4)
LYMPHOCYTES NFR BLD AUTO: 17 %
M PROTEIN MFR SERPL ELPH: 8 G/DL (ref 6.1–8.3)
MCH RBC QN AUTO: 31.3 PG (ref 27–33.2)
MCHC RBC AUTO-ENTMCNC: 34.1 G/DL (ref 31–37)
MCV RBC AUTO: 91.5 FL (ref 80–99)
MONOCYTES # BLD AUTO: 0.42 X10(3) UL (ref 0.1–1)
MONOCYTES NFR BLD AUTO: 6.5 %
NEUTROPHIL ABS PRELIM: 4.91 X10 (3) UL (ref 1.3–6.7)
NEUTROPHILS # BLD AUTO: 4.91 X10(3) UL (ref 1.3–6.7)
NEUTROPHILS NFR BLD AUTO: 76 %
PLATELET # BLD AUTO: 200 10(3)UL (ref 150–450)
POTASSIUM SERPL-SCNC: 3.9 MMOL/L (ref 3.6–5.1)
RBC # BLD AUTO: 4.96 X10(6)UL (ref 4.3–5.7)
RED CELL DISTRIBUTION WIDTH-SD: 45.1 FL (ref 35.1–46.3)
SODIUM SERPL-SCNC: 132 MMOL/L (ref 136–144)
WBC # BLD AUTO: 6.5 X10(3) UL (ref 4–13)

## 2018-02-09 PROCEDURE — 96376 TX/PRO/DX INJ SAME DRUG ADON: CPT

## 2018-02-09 PROCEDURE — 85025 COMPLETE CBC W/AUTO DIFF WBC: CPT | Performed by: PHYSICIAN ASSISTANT

## 2018-02-09 PROCEDURE — 82962 GLUCOSE BLOOD TEST: CPT

## 2018-02-09 PROCEDURE — 71046 X-RAY EXAM CHEST 2 VIEWS: CPT | Performed by: PHYSICIAN ASSISTANT

## 2018-02-09 PROCEDURE — 99284 EMERGENCY DEPT VISIT MOD MDM: CPT

## 2018-02-09 PROCEDURE — 74176 CT ABD & PELVIS W/O CONTRAST: CPT | Performed by: PHYSICIAN ASSISTANT

## 2018-02-09 PROCEDURE — 96361 HYDRATE IV INFUSION ADD-ON: CPT

## 2018-02-09 PROCEDURE — 80053 COMPREHEN METABOLIC PANEL: CPT | Performed by: PHYSICIAN ASSISTANT

## 2018-02-09 PROCEDURE — 96375 TX/PRO/DX INJ NEW DRUG ADDON: CPT

## 2018-02-09 PROCEDURE — 83690 ASSAY OF LIPASE: CPT | Performed by: PHYSICIAN ASSISTANT

## 2018-02-09 PROCEDURE — 96374 THER/PROPH/DIAG INJ IV PUSH: CPT

## 2018-02-09 RX ORDER — MORPHINE SULFATE 4 MG/ML
4 INJECTION, SOLUTION INTRAMUSCULAR; INTRAVENOUS ONCE
Status: COMPLETED | OUTPATIENT
Start: 2018-02-09 | End: 2018-02-09

## 2018-02-09 RX ORDER — MORPHINE SULFATE 2 MG/ML
INJECTION, SOLUTION INTRAMUSCULAR; INTRAVENOUS
Status: COMPLETED
Start: 2018-02-09 | End: 2018-02-09

## 2018-02-09 RX ORDER — ONDANSETRON 2 MG/ML
4 INJECTION INTRAMUSCULAR; INTRAVENOUS ONCE
Status: COMPLETED | OUTPATIENT
Start: 2018-02-09 | End: 2018-02-09

## 2018-02-09 RX ORDER — MORPHINE SULFATE 2 MG/ML
2 INJECTION, SOLUTION INTRAMUSCULAR; INTRAVENOUS ONCE
Status: COMPLETED | OUTPATIENT
Start: 2018-02-09 | End: 2018-02-09

## 2018-02-09 NOTE — ED INITIAL ASSESSMENT (HPI)
Pt to the ED for c/o chest congestion for two days. Pt states last night he began vomiting. PT is diabetic.

## 2018-02-09 NOTE — ED PROVIDER NOTES
Patient Seen in: [facility] In [place]    History   Patient presents with:  Nausea/Vomiting/Diarrhea (gastrointestinal)    Stated Complaint: N/V/D    HPI    60-year-old diabetic with a history of pancreatitis here with complaint of na noncontributory to the presenting problem, except as indicated as above.     Smoking status: Current Every Day Smoker                                                   Packs/day: 0.50      Years: 5.00         Types: Cigarettes  Smokeless tobacco: Never Used Abnormal; Notable for the following:        Result Value    Lipase 35 (*)     All other components within normal limits   COMP METABOLIC PANEL (14) - Abnormal; Notable for the following:     Glucose 331 (*)     BUN 22 (*)     Sodium 132 (*)     Chloride 96 + LAT CHEST (CPT=71046)  INDICATIONS:  dizziness, blood sugars high and low both, low blood pressure, back pain  COMPARISON:  [facility] , XR CHEST PA + LAT CHEST (CPT=71046), 1/16/2018, 17:54. TECHNIQUE:  PA and lateral chest radiographs were obtained.   MARC Images acquired in sagittal and axial planes. Intravenous gadolinium was administered followed by multiplanar post-infusion T1 weighted sequences.    PATIENT STATED HISTORY:(As transcribed by Technologist)  Patient states Upper Body Pain after Cortisone in 10/25/2017, 8:19. [place], CT ANGIOGRAPHY, CHEST (CPT=71275), 10/23/2017, 11:17. INDICATIONS:  N/V/D  TECHNIQUE:  Unenhanced multislice CT scanning was performed from the dome of the diaphragm to the pubic symphysis.   Dose reduction techniques were us represent duodenitis/enteritis. 2.  Pancreatic ductal dilatation measuring 6 mm with atrophy of the pancreatic body and tail. MRCP is recommended to further evaluate.   There are small calcifications within the pancreatic head and uncinate process which ma

## 2018-02-13 ENCOUNTER — HOSPITAL ENCOUNTER (OUTPATIENT)
Facility: HOSPITAL | Age: 53
Setting detail: OBSERVATION
Discharge: HOME | DRG: 638 | End: 2018-02-14
Attending: EMERGENCY MEDICINE | Admitting: INTERNAL MEDICINE
Payer: MEDICAID

## 2018-02-13 ENCOUNTER — OFFICE VISIT (OUTPATIENT)
Dept: SURGERY | Facility: CLINIC | Age: 53
End: 2018-02-13

## 2018-02-13 VITALS — SYSTOLIC BLOOD PRESSURE: 118 MMHG | DIASTOLIC BLOOD PRESSURE: 80 MMHG | HEART RATE: 100 BPM

## 2018-02-13 DIAGNOSIS — M54.6 CHRONIC MIDLINE THORACIC BACK PAIN: ICD-10-CM

## 2018-02-13 DIAGNOSIS — M25.512 CHRONIC LEFT SHOULDER PAIN: ICD-10-CM

## 2018-02-13 DIAGNOSIS — M47.812 FACET ARTHROPATHY, CERVICAL: ICD-10-CM

## 2018-02-13 DIAGNOSIS — G89.4 CHRONIC PAIN SYNDROME: Primary | ICD-10-CM

## 2018-02-13 DIAGNOSIS — G89.29 CHRONIC MIDLINE THORACIC BACK PAIN: ICD-10-CM

## 2018-02-13 DIAGNOSIS — M48.02 NEURAL FORAMINAL STENOSIS OF CERVICAL SPINE: ICD-10-CM

## 2018-02-13 DIAGNOSIS — E10.65 TYPE 1 DIABETES MELLITUS WITH HYPERGLYCEMIA (HCC): ICD-10-CM

## 2018-02-13 DIAGNOSIS — Z98.890 HISTORY OF CERVICAL SPINAL SURGERY: ICD-10-CM

## 2018-02-13 DIAGNOSIS — M50.30 DDD (DEGENERATIVE DISC DISEASE), CERVICAL: ICD-10-CM

## 2018-02-13 DIAGNOSIS — E87.2 METABOLIC ACIDOSIS: Primary | ICD-10-CM

## 2018-02-13 DIAGNOSIS — G89.29 CHRONIC LEFT SHOULDER PAIN: ICD-10-CM

## 2018-02-13 PROBLEM — E87.20 METABOLIC ACIDOSIS: Status: ACTIVE | Noted: 2018-02-13

## 2018-02-13 LAB
ALBUMIN SERPL-MCNC: 3.9 G/DL (ref 3.5–4.8)
ALP LIVER SERPL-CCNC: 105 U/L (ref 45–117)
ALT SERPL-CCNC: 61 U/L (ref 17–63)
AST SERPL-CCNC: 27 U/L (ref 15–41)
BASOPHILS # BLD AUTO: 0.05 X10(3) UL (ref 0–0.1)
BASOPHILS NFR BLD AUTO: 0.6 %
BILIRUB SERPL-MCNC: 0.6 MG/DL (ref 0.1–2)
BUN BLD-MCNC: 23 MG/DL (ref 8–20)
CALCIUM BLD-MCNC: 9.6 MG/DL (ref 8.3–10.3)
CHLORIDE: 93 MMOL/L (ref 101–111)
CO2: 18 MMOL/L (ref 22–32)
CREAT BLD-MCNC: 1.36 MG/DL (ref 0.7–1.3)
EOSINOPHIL # BLD AUTO: 0.1 X10(3) UL (ref 0–0.3)
EOSINOPHIL NFR BLD AUTO: 1.3 %
ERYTHROCYTE [DISTWIDTH] IN BLOOD BY AUTOMATED COUNT: 12.7 % (ref 11.5–16)
GLUCOSE BLD-MCNC: 235 MG/DL (ref 65–99)
GLUCOSE BLD-MCNC: 371 MG/DL (ref 65–99)
GLUCOSE BLD-MCNC: 472 MG/DL (ref 70–99)
HCT VFR BLD AUTO: 41.3 % (ref 37–53)
HGB BLD-MCNC: 14.5 G/DL (ref 13–17)
IMMATURE GRANULOCYTE COUNT: 0.02 X10(3) UL (ref 0–1)
IMMATURE GRANULOCYTE RATIO %: 0.3 %
LIPASE: 41 U/L (ref 73–393)
LYMPHOCYTES # BLD AUTO: 1.62 X10(3) UL (ref 0.9–4)
LYMPHOCYTES NFR BLD AUTO: 20.6 %
M PROTEIN MFR SERPL ELPH: 8 G/DL (ref 6.1–8.3)
MCH RBC QN AUTO: 31.4 PG (ref 27–33.2)
MCHC RBC AUTO-ENTMCNC: 35.1 G/DL (ref 31–37)
MCV RBC AUTO: 89.4 FL (ref 80–99)
MONOCYTES # BLD AUTO: 0.65 X10(3) UL (ref 0.1–1)
MONOCYTES NFR BLD AUTO: 8.3 %
NEUTROPHIL ABS PRELIM: 5.41 X10 (3) UL (ref 1.3–6.7)
NEUTROPHILS # BLD AUTO: 5.41 X10(3) UL (ref 1.3–6.7)
NEUTROPHILS NFR BLD AUTO: 68.9 %
PLATELET # BLD AUTO: 212 10(3)UL (ref 150–450)
POTASSIUM SERPL-SCNC: 4.5 MMOL/L (ref 3.6–5.1)
RBC # BLD AUTO: 4.62 X10(6)UL (ref 4.3–5.7)
RED CELL DISTRIBUTION WIDTH-SD: 41.8 FL (ref 35.1–46.3)
SODIUM SERPL-SCNC: 129 MMOL/L (ref 136–144)
VENOUS BASE EXCESS: -7.6
VENOUS BLOOD GAS HCO3: 18 MEQ/L (ref 23–27)
VENOUS O2 SAT CALC: 82 % (ref 72–78)
VENOUS O2 SATURATION: 81 % (ref 72–78)
VENOUS PCO2: 37 MM HG (ref 38–50)
VENOUS PH: 7.3 (ref 7.33–7.43)
VENOUS PO2: 52 MM HG (ref 30–50)
WBC # BLD AUTO: 7.9 X10(3) UL (ref 4–13)

## 2018-02-13 PROCEDURE — 99215 OFFICE O/P EST HI 40 MIN: CPT | Performed by: PHYSICIAN ASSISTANT

## 2018-02-13 RX ORDER — PANTOPRAZOLE SODIUM 40 MG/1
40 TABLET, DELAYED RELEASE ORAL DAILY
Status: DISCONTINUED | OUTPATIENT
Start: 2018-02-14 | End: 2018-02-14

## 2018-02-13 RX ORDER — ONDANSETRON 4 MG/1
4 TABLET, ORALLY DISINTEGRATING ORAL EVERY 8 HOURS PRN
Status: DISCONTINUED | OUTPATIENT
Start: 2018-02-13 | End: 2018-02-14

## 2018-02-13 RX ORDER — CLONAZEPAM 0.5 MG/1
0.5 TABLET ORAL 2 TIMES DAILY PRN
Status: DISCONTINUED | OUTPATIENT
Start: 2018-02-13 | End: 2018-02-14

## 2018-02-13 RX ORDER — FLUTICASONE PROPIONATE 50 MCG
2 SPRAY, SUSPENSION (ML) NASAL DAILY
Status: DISCONTINUED | OUTPATIENT
Start: 2018-02-14 | End: 2018-02-14

## 2018-02-13 RX ORDER — MORPHINE SULFATE 2 MG/ML
1 INJECTION, SOLUTION INTRAMUSCULAR; INTRAVENOUS EVERY 2 HOUR PRN
Status: DISCONTINUED | OUTPATIENT
Start: 2018-02-13 | End: 2018-02-14

## 2018-02-13 RX ORDER — DEXTROSE MONOHYDRATE 25 G/50ML
50 INJECTION, SOLUTION INTRAVENOUS
Status: DISCONTINUED | OUTPATIENT
Start: 2018-02-13 | End: 2018-02-14

## 2018-02-13 RX ORDER — SODIUM CHLORIDE 9 MG/ML
1000 INJECTION, SOLUTION INTRAVENOUS ONCE
Status: COMPLETED | OUTPATIENT
Start: 2018-02-13 | End: 2018-02-13

## 2018-02-13 RX ORDER — LISINOPRIL 20 MG/1
20 TABLET ORAL DAILY
Status: DISCONTINUED | OUTPATIENT
Start: 2018-02-14 | End: 2018-02-14

## 2018-02-13 RX ORDER — HYDROCODONE BITARTRATE AND ACETAMINOPHEN 5; 325 MG/1; MG/1
2 TABLET ORAL ONCE
Status: COMPLETED | OUTPATIENT
Start: 2018-02-13 | End: 2018-02-13

## 2018-02-13 RX ORDER — ATORVASTATIN CALCIUM 40 MG/1
40 TABLET, FILM COATED ORAL NIGHTLY
Status: DISCONTINUED | OUTPATIENT
Start: 2018-02-13 | End: 2018-02-14

## 2018-02-13 RX ORDER — DEXTROAMPHETAMINE SACCHARATE, AMPHETAMINE ASPARTATE, DEXTROAMPHETAMINE SULFATE AND AMPHETAMINE SULFATE 2.5; 2.5; 2.5; 2.5 MG/1; MG/1; MG/1; MG/1
10 TABLET ORAL
Status: DISCONTINUED | OUTPATIENT
Start: 2018-02-14 | End: 2018-02-14

## 2018-02-13 RX ORDER — BUSPIRONE HYDROCHLORIDE 10 MG/1
20 TABLET ORAL 3 TIMES DAILY
COMMUNITY

## 2018-02-13 RX ORDER — SODIUM CHLORIDE 9 MG/ML
INJECTION, SOLUTION INTRAVENOUS CONTINUOUS
Status: DISCONTINUED | OUTPATIENT
Start: 2018-02-13 | End: 2018-02-14

## 2018-02-13 RX ORDER — FLUOXETINE HYDROCHLORIDE 20 MG/1
80 CAPSULE ORAL NIGHTLY
Status: DISCONTINUED | OUTPATIENT
Start: 2018-02-13 | End: 2018-02-14

## 2018-02-13 RX ORDER — MIRTAZAPINE 15 MG/1
45 TABLET, FILM COATED ORAL NIGHTLY
Status: DISCONTINUED | OUTPATIENT
Start: 2018-02-13 | End: 2018-02-14

## 2018-02-13 RX ORDER — SODIUM CHLORIDE 9 MG/ML
INJECTION, SOLUTION INTRAVENOUS CONTINUOUS
Status: ACTIVE | OUTPATIENT
Start: 2018-02-13 | End: 2018-02-13

## 2018-02-13 RX ORDER — CYCLOBENZAPRINE HCL 10 MG
10 TABLET ORAL 3 TIMES DAILY PRN
Status: DISCONTINUED | OUTPATIENT
Start: 2018-02-13 | End: 2018-02-14

## 2018-02-13 RX ORDER — GABAPENTIN 300 MG/1
300 CAPSULE ORAL 3 TIMES DAILY
Status: DISCONTINUED | OUTPATIENT
Start: 2018-02-13 | End: 2018-02-14

## 2018-02-13 RX ORDER — MORPHINE SULFATE 2 MG/ML
4 INJECTION, SOLUTION INTRAMUSCULAR; INTRAVENOUS EVERY 2 HOUR PRN
Status: DISCONTINUED | OUTPATIENT
Start: 2018-02-13 | End: 2018-02-14

## 2018-02-13 RX ORDER — ATORVASTATIN CALCIUM 40 MG/1
40 TABLET, FILM COATED ORAL NIGHTLY
COMMUNITY
End: 2018-04-17

## 2018-02-13 RX ORDER — DEXTROSE MONOHYDRATE 25 G/50ML
50 INJECTION, SOLUTION INTRAVENOUS
Status: DISCONTINUED | OUTPATIENT
Start: 2018-02-13 | End: 2018-02-13

## 2018-02-13 RX ORDER — MORPHINE SULFATE 2 MG/ML
2 INJECTION, SOLUTION INTRAMUSCULAR; INTRAVENOUS EVERY 2 HOUR PRN
Status: DISCONTINUED | OUTPATIENT
Start: 2018-02-13 | End: 2018-02-14

## 2018-02-13 RX ORDER — INSULIN ASPART 100 [IU]/ML
12 INJECTION, SOLUTION INTRAVENOUS; SUBCUTANEOUS ONCE
Status: COMPLETED | OUTPATIENT
Start: 2018-02-13 | End: 2018-02-13

## 2018-02-13 RX ORDER — CYCLOBENZAPRINE HCL 10 MG
10 TABLET ORAL 3 TIMES DAILY PRN
COMMUNITY
End: 2018-03-19

## 2018-02-13 RX ORDER — METOPROLOL SUCCINATE 50 MG/1
50 TABLET, EXTENDED RELEASE ORAL
Status: DISCONTINUED | OUTPATIENT
Start: 2018-02-14 | End: 2018-02-14

## 2018-02-13 RX ORDER — HYDROCODONE BITARTRATE AND ACETAMINOPHEN 10; 325 MG/1; MG/1
1 TABLET ORAL EVERY 6 HOURS PRN
Status: DISCONTINUED | OUTPATIENT
Start: 2018-02-13 | End: 2018-02-14

## 2018-02-13 RX ORDER — GABAPENTIN 300 MG/1
300 CAPSULE ORAL 3 TIMES DAILY
COMMUNITY
End: 2018-05-11

## 2018-02-13 RX ORDER — HEPARIN SODIUM 5000 [USP'U]/ML
5000 INJECTION, SOLUTION INTRAVENOUS; SUBCUTANEOUS EVERY 8 HOURS SCHEDULED
Status: DISCONTINUED | OUTPATIENT
Start: 2018-02-13 | End: 2018-02-14

## 2018-02-13 RX ORDER — METOPROLOL SUCCINATE 50 MG/1
50 TABLET, EXTENDED RELEASE ORAL DAILY
COMMUNITY
End: 2018-05-11

## 2018-02-13 RX ORDER — BUSPIRONE HYDROCHLORIDE 5 MG/1
20 TABLET ORAL 3 TIMES DAILY
Status: DISCONTINUED | OUTPATIENT
Start: 2018-02-13 | End: 2018-02-14

## 2018-02-13 RX ORDER — HYDROCODONE BITARTRATE AND ACETAMINOPHEN 10; 325 MG/1; MG/1
1 TABLET ORAL ONCE
Status: COMPLETED | OUTPATIENT
Start: 2018-02-13 | End: 2018-02-13

## 2018-02-13 NOTE — ED PROVIDER NOTES
Patient Seen in: [facility] Emergency Department    History   Patient presents with:  Hyperglycemia (metabolic)    Stated Complaint: hyperglycemia/sugars running in 500's/has insulin pump    HPI    This is a 50-year-old male with past medical histor renal failure ([facility]    • Alcohol dependence in remission ([facility] 1/11/2018   • Back pain    • Back problem    • Cataract     left eye   • Chronic pancreatitis, unspecified pancreatitis type ([facility] 1/2/2018   • Depression    • Esophageal reflux    • Essential (36.7 °C) (Oral)   Resp 16   Ht 188 cm (6' 2\")   Wt 84.4 kg   SpO2 95%   BMI 23.88 kg/m²         Physical Exam    GENERAL: Awake, alert oriented x3, nontoxic appearing. SKIN: Normal, warm, and dry. Facial flushing.   HEENT:  Pupils equally round and forest Please view results for these tests on the individual orders.    URINALYSIS WITH CULTURE REFLEX   MICROALB/CREAT RATIO, RANDOM URINE   HEMOGLOBIN P2M   BASIC METABOLIC PANEL (8)   CBC WITH DIFFERENTIAL WITH PLATELET   RAINBOW DRAW BLUE   RAINBOW DRAW LA

## 2018-02-13 NOTE — PROGRESS NOTES
Neurosurgery Cervical  Follow up      REASON FOR VISIT: Pain management      HISTORY OF PRESENT ILLNESS:Thomas Smalls is a 47year old s/p C5-6, C6-7 hemilaminotomy (Dr. Ra Espinosa, 2016), s/p L5-S1 fusion (Dr. Josie Hardwick, 2007). Patient is right handed.  Who pr patient had the door open, and was laying on the bed with his arms covering his face. RESPIRATORY: Easy and even   CARDIAC: Regular rate and rhythm   HEENT:  Normocephalic, atraumatic  NEUROLOGICAL:  This patient is alert and orientated x 3.   Speech flue CERVICAL DISC LEVELS:  C2-C3:  No significant disc/facet abnormality, spinal stenosis, or foraminal narrowing. C3-C4:  Minimal disc bulge without spinal canal or neural foraminal stenosis.   C4-C5:  Left paracentral posterior disc osteophyte complex is n stenosis, or foraminal narrowing. C4-C5:  There is mild disc desiccation and mild broad-based annular disc bulge. No significant stenosis suggested. C5-C6:  There is mild desiccation, mild disc height loss and broad-based disc bulge.  There is associated degenerative changes. However no significant stenosis is identified. Dictated by: Willy Lucia DO on 4/25/2017 at 9:05       Approved by: Willy Lucia DO         PROCEDURE:  MRI OF THE THORACIC SPINE WITHOUT CONTRAST  COMPARISON:  None.      INDICATIONS: sooner for any new, worsening or concerning signs or symptoms   2. Medication: None prescribed   3. Imaging:    - Reviewed today:    - MRI cervical and thoracic:     - Shows cervical DDD with some spinal stenosis and foraminal stenosis.  Agree with radiolog to satisfaction. I will have nursing call the patient to discuss follow up and reassessment with Dr. Ruth Ann Johansen and XR cervical flex/ext. Total visit time: 40 min  More than 50% spent coordinating care and counseling. Miguel Waller M.S., PALIZZY  E

## 2018-02-14 ENCOUNTER — TELEPHONE (OUTPATIENT)
Dept: SURGERY | Facility: CLINIC | Age: 53
End: 2018-02-14

## 2018-02-14 VITALS
BODY MASS INDEX: 23.87 KG/M2 | RESPIRATION RATE: 17 BRPM | WEIGHT: 186 LBS | TEMPERATURE: 98 F | DIASTOLIC BLOOD PRESSURE: 71 MMHG | SYSTOLIC BLOOD PRESSURE: 112 MMHG | HEIGHT: 74 IN | OXYGEN SATURATION: 93 % | HEART RATE: 79 BPM

## 2018-02-14 LAB
BASOPHILS # BLD AUTO: 0.05 X10(3) UL (ref 0–0.1)
BASOPHILS NFR BLD AUTO: 0.8 %
BUN BLD-MCNC: 18 MG/DL (ref 8–20)
CALCIUM BLD-MCNC: 9.5 MG/DL (ref 8.3–10.3)
CHLORIDE: 99 MMOL/L (ref 101–111)
CO2: 27 MMOL/L (ref 22–32)
CREAT BLD-MCNC: 1.04 MG/DL (ref 0.7–1.3)
EOSINOPHIL # BLD AUTO: 0.21 X10(3) UL (ref 0–0.3)
EOSINOPHIL NFR BLD AUTO: 3.5 %
ERYTHROCYTE [DISTWIDTH] IN BLOOD BY AUTOMATED COUNT: 13 % (ref 11.5–16)
EST. AVERAGE GLUCOSE BLD GHB EST-MCNC: 266 MG/DL (ref 68–126)
GLUCOSE BLD-MCNC: 113 MG/DL (ref 65–99)
GLUCOSE BLD-MCNC: 283 MG/DL (ref 65–99)
GLUCOSE BLD-MCNC: 327 MG/DL (ref 65–99)
GLUCOSE BLD-MCNC: 361 MG/DL (ref 70–99)
HBA1C MFR BLD HPLC: 10.9 % (ref ?–5.7)
HCT VFR BLD AUTO: 40.5 % (ref 37–53)
HGB BLD-MCNC: 14.2 G/DL (ref 13–17)
IMMATURE GRANULOCYTE COUNT: 0.01 X10(3) UL (ref 0–1)
IMMATURE GRANULOCYTE RATIO %: 0.2 %
LYMPHOCYTES # BLD AUTO: 1.7 X10(3) UL (ref 0.9–4)
LYMPHOCYTES NFR BLD AUTO: 28.2 %
MCH RBC QN AUTO: 31.4 PG (ref 27–33.2)
MCHC RBC AUTO-ENTMCNC: 35.1 G/DL (ref 31–37)
MCV RBC AUTO: 89.6 FL (ref 80–99)
MONOCYTES # BLD AUTO: 0.63 X10(3) UL (ref 0.1–1)
MONOCYTES NFR BLD AUTO: 10.5 %
NEUTROPHIL ABS PRELIM: 3.42 X10 (3) UL (ref 1.3–6.7)
NEUTROPHILS # BLD AUTO: 3.42 X10(3) UL (ref 1.3–6.7)
NEUTROPHILS NFR BLD AUTO: 56.8 %
PLATELET # BLD AUTO: 224 10(3)UL (ref 150–450)
POTASSIUM SERPL-SCNC: 4.5 MMOL/L (ref 3.6–5.1)
RBC # BLD AUTO: 4.52 X10(6)UL (ref 4.3–5.7)
RED CELL DISTRIBUTION WIDTH-SD: 42.3 FL (ref 35.1–46.3)
SODIUM SERPL-SCNC: 133 MMOL/L (ref 136–144)
WBC # BLD AUTO: 6 X10(3) UL (ref 4–13)

## 2018-02-14 PROCEDURE — 99233 SBSQ HOSP IP/OBS HIGH 50: CPT | Performed by: CLINICAL NURSE SPECIALIST

## 2018-02-14 RX ORDER — IBUPROFEN 600 MG/1
600 TABLET ORAL EVERY 6 HOURS PRN
Status: DISCONTINUED | OUTPATIENT
Start: 2018-02-14 | End: 2018-02-14

## 2018-02-14 RX ORDER — INSULIN ASPART 100 [IU]/ML
INJECTION, SOLUTION INTRAVENOUS; SUBCUTANEOUS
Qty: 2 VIAL | Refills: 0 | Status: SHIPPED | OUTPATIENT
Start: 2018-02-14 | End: 2018-03-30

## 2018-02-14 RX ORDER — HYDROCODONE BITARTRATE AND ACETAMINOPHEN 10; 325 MG/1; MG/1
1 TABLET ORAL EVERY 4 HOURS PRN
Status: DISCONTINUED | OUTPATIENT
Start: 2018-02-14 | End: 2018-02-14

## 2018-02-14 RX ORDER — CALCIUM CARB/VITAMIN D3/VIT K1 500-100-40
TABLET,CHEWABLE ORAL
Qty: 100 EACH | Refills: 0 | Status: SHIPPED | OUTPATIENT
Start: 2018-02-14 | End: 2018-03-19

## 2018-02-14 NOTE — TELEPHONE ENCOUNTER
Per Miguel,  \"        Please advise the patient that I reviewed his imaging and discussed his case with Dr. Chanel Tovar. Dr. Chanel Tovar would like the patient to follow up with him as well as have an XR cervical flex/ext, prior to follow up. I placed the order.  He

## 2018-02-14 NOTE — CONSULTS
ENDOCRINOLOGY CONSULTATION    Attending physician:  Kayli Wilkerson MD  Consulting physican:  Nini Davison MD    Admission Date:  2/13/2018  Consultation Date:  2/14/2018      Reason for consultation: Mgmt of Type 1 DM    Chief Complaint:   Ad statin. 2. Neuro:   No CVA or TIA  3.  Vascular: No PVD      All other review of systems is negative        Past Medical History:   Diagnosis Date   • Acute renal failure ([facility]    • Alcohol dependence in remission ([facility] 1/11/2018   • Back pain    • Back pr Amphetamine-Dextroamphet ER 20 MG Oral Capsule SR 24 Hr Take 10 mg by mouth 2 (two) times daily. Disp:  Rfl:    HYDROcodone-acetaminophen  MG Oral Tab Take 1 tablet by mouth every 6 (six) hours as needed for Pain.  Disp: 120 tablet Rfl: 0   Fluticas UNIT/ML flextouch 36 Units 36 Units Subcutaneous Once   amphetamine-dextroamphetamine (ADDERALL) tab 10 mg 10 mg Oral BID AC   atorvastatin (LIPITOR) tab 40 mg 40 mg Oral Nightly   BusPIRone HCl (BUSPAR) tab 20 mg 20 mg Oral TID   ClonazePAM (KLONOPIN) tab hands  Shellfish-Derived P*    Rash    Comment:Pt has had CTs with dye, no reactions noted.         Social History    Marital status: Single              Spouse name:                       Years of education:                 Number of children: - 1.30 mg/dL 1.04   GFR, Non-      >=60 83   GFR, -American      >=60 96   CALCIUM      8.3 - 10.3 mg/dL 9.5   Sodium      136 - 144 mmol/L 133 (L)   Potassium      3.6 - 5.1 mmol/L 4.5   Chloride      101 - 111 mmol/L 99 (L)   Carbo 8 gms of carbs    Correction factor: 1 Unit for every 20 mg/dl above a target glucose of  mg/dl    Insulin action: 4 hours    6. I already completed my part of the order reconciliation for discharge.  Script was sent electronically to the patient's ph

## 2018-02-14 NOTE — DIETARY NOTE
NUTRITION INITIAL ASSESSMENT    Pt is at moderate nutrition risk. Pt does not meet malnutrition criteria.     NUTRITION DIAGNOSIS/PROBLEM:    Predicted suboptimal energy intake related to unintentional wt loss as evidenced by pt report of 20 lb (10%) wt los 86.2 kg (190 lb)  01/16/18 : 86.2 kg (190 lb)      NUTRITION:  Diet: 1800 ADA  Oral Supplements: none    FOOD/NUTRITION RELATED HISTORY:  Appetite: Good  Intake: %  Intake Meeting Needs: Yes  Food Allergies: Shellfish  Cultural/Ethnic/Adventism Prefe

## 2018-02-14 NOTE — PAYOR COMM NOTE
--------------  ADMISSION REVIEW       2/14      ED        Stated Complaint: hyperglycemia/sugars running in 500's/has insulin pump        This is a 81-year-old male with past medical history of with recent epidural injections, awaiting pain pump insertion   Venous pH 7.30 (*)       Venous pCO2 37 (*)       Venous pO2 52 (*)       Venous O2 Saturation 81 (*)       Venous O2 Sat.  Calc. 82 (*)       Venous Bicarbonate 18.0 (*)       All other components within normal limits   LIPASE - Abnormal; Notable for t

## 2018-02-14 NOTE — PLAN OF CARE
Diabetes/Glucose Control    • Glucose maintained within prescribed range Progressing        PAIN - ADULT    • Verbalizes/displays adequate comfort level or patient's stated pain goal Progressing        Patient/Family Goals    • Patient/Family [facility]

## 2018-02-14 NOTE — H&P
[facility] Patient Status:  Inpatient    1965 MRN [id number]   [facility] 5NW-A Attending Sylvia Walsh MD   Hosp Day # 1 PCP Mallika Ivan MD     Cc: elevated blood sugars    Hi Neuropathy     feet and toes   • Osteoarthritis    • Pneumonia due to organism    • PTSD (post-traumatic stress disorder)    • Rheumatoid arthritis(714.0)    • Sleep apnea     pt wears oral device CPAP in past   • Type I (juvenile type) diabetes mellitus w Pantoprazole Sodium 40 MG Oral Tab EC Take 1 tablet (40 mg total) by mouth daily. Before meal Disp: 30 tablet Rfl: 11 2/12/2018 at 2100   lisinopril 20 MG Oral Tab Take 1 tablet (20 mg total) by mouth daily.  Disp: 30 tablet Rfl: 0 2/12/2018 at 2100   ond -  1:50  Disp: 1 vial Rfl: 0 2/13/2018 at Unknown time   ClonazePAM 0.5 MG Oral Tab Take 0.5 mg by mouth 2 (two) times daily as needed for Anxiety.  Disp:  Rfl:  2/12/2018 at Unknown time   Varenicline Tartrate (CHANTIX STARTING MONTH PAK) 0.5 MG X 11 & 1 M hyperglycemia  - endocrinology and clinical nurse specialist consults   - insulin pump in place, states he does not have endocrinologist as outpatient, will need to establish care    # Hyponatremia  - mild at 133, continue to follow during admission    # H

## 2018-02-14 NOTE — CONSULTS
[facility]  CNS Consult Note    Jerome Sibley Patient Status:  Inpatient    1965 MRN [id number]   [facility] 5NW-A Attending Domenic Mayer MD   Hosp Day # 1 PCP Chance Jameson MD     Reason for Consult:     Insulin P Dr. Latrell Johnson and he is currently on subcutaneous basal/bolus insulin as there was question as to the pump working. The patient had removed the battery in the insulin pump as it was continuously beeping. Obtained a new battery and started the pump.   The scr insulin pump settings to restart his pump as pump interrogation for current settings is not possible. Dr. Reyna Points stated she will discuss these options with the patient and determine the plan and will notify me to restart the pump tomorrow if needed.

## 2018-02-15 NOTE — PAYOR COMM NOTE
--------------  DISCHARGE REVIEW    Payor: Eduardo Foy #:  [id number]  Authorization Number: [id number]    Admit date: 2/13/18  Admit time:  1959  Discharge Date: 2/14/2018  4:34 PM     Admitting Physician: Mark Garcia

## 2018-02-22 NOTE — PAYOR COMM NOTE
--------------  DISCHARGE REVIEW    Payor: Eduardo Foy #:  [id number]  Authorization Number: [id number]    Admit date: 2/13/18  Admit time:  1959  Discharge Date: 2/14/2018  4:34 PM     Admitting Physician: Yobani Townsend

## 2018-02-23 PROBLEM — E78.5 DYSLIPIDEMIA: Status: ACTIVE | Noted: 2018-02-23

## 2018-02-23 PROBLEM — E08.9 DIABETES MELLITUS DUE TO UNDERLYING CONDITION (HCC): Status: ACTIVE | Noted: 2018-02-23

## 2018-02-28 PROCEDURE — 36415 COLL VENOUS BLD VENIPUNCTURE: CPT | Performed by: INTERNAL MEDICINE

## 2018-02-28 PROCEDURE — 86341 ISLET CELL ANTIBODY: CPT | Performed by: INTERNAL MEDICINE

## 2018-02-28 PROCEDURE — 83516 IMMUNOASSAY NONANTIBODY: CPT | Performed by: INTERNAL MEDICINE

## 2018-03-04 PROBLEM — E10.42 TYPE 1 DIABETES MELLITUS WITH DIABETIC POLYNEUROPATHY (HCC): Status: ACTIVE | Noted: 2018-03-04

## 2018-03-04 PROBLEM — Z96.41 INSULIN PUMP STATUS: Status: ACTIVE | Noted: 2018-03-04

## 2018-03-16 PROBLEM — M51.36 DEGENERATIVE LUMBAR DISC: Status: ACTIVE | Noted: 2018-03-16

## 2018-03-16 PROBLEM — F43.10 PTSD (POST-TRAUMATIC STRESS DISORDER): Status: ACTIVE | Noted: 2018-03-16

## 2018-03-16 PROBLEM — E08.9 DIABETES MELLITUS DUE TO UNDERLYING CONDITION (HCC): Status: RESOLVED | Noted: 2018-02-23 | Resolved: 2018-03-16

## 2018-03-19 PROBLEM — K21.9 GASTROESOPHAGEAL REFLUX DISEASE WITHOUT ESOPHAGITIS: Status: ACTIVE | Noted: 2018-03-19

## 2018-04-13 PROBLEM — S36.209S DIABETES MELLITUS DUE TO PANCREATIC INJURY (HCC): Status: ACTIVE | Noted: 2018-04-13

## 2018-04-13 PROBLEM — E13.9 DIABETES MELLITUS DUE TO PANCREATIC INJURY (HCC): Status: ACTIVE | Noted: 2018-04-13

## 2018-04-25 PROCEDURE — 84402 ASSAY OF FREE TESTOSTERONE: CPT | Performed by: INTERNAL MEDICINE

## 2018-04-25 PROCEDURE — 81003 URINALYSIS AUTO W/O SCOPE: CPT | Performed by: INTERNAL MEDICINE

## 2018-04-25 PROCEDURE — 82607 VITAMIN B-12: CPT | Performed by: INTERNAL MEDICINE

## 2018-04-25 PROCEDURE — 82746 ASSAY OF FOLIC ACID SERUM: CPT | Performed by: INTERNAL MEDICINE

## 2018-04-25 PROCEDURE — 84403 ASSAY OF TOTAL TESTOSTERONE: CPT | Performed by: INTERNAL MEDICINE

## 2018-05-09 PROBLEM — Z98.890 S/P INSERTION OF INTRATHECAL PUMP: Status: ACTIVE | Noted: 2018-05-09

## 2018-05-09 PROBLEM — E10.42 TYPE 1 DIABETES MELLITUS WITH DIABETIC POLYNEUROPATHY (HCC): Status: ACTIVE | Noted: 2018-05-09

## 2018-07-23 NOTE — ED PROVIDER NOTES
[facility], [address] [place] [address]  Phone: [phone]  Fax [phone]    Physical Therapy Daily Treatment Note  Date:  2018    Patient Name:  Clarita Yousif    :  1938  MRN: [id number]  Restrictions/Precautions:    Physician Information:  Referring Practitioner: Dr. Veto Osborne  Medical/Treatment Diagnosis Information:  · Diagnosis: Right Knee OA (M17.11) s/p Right TKR 18  · Treatment Diagnosis: Right Knee Pain (M25.561) / Difficulty Walking (R26.2) / Right Knee Stiffness (M25.661)                    [] Conservative / [x] Surgical - DOS: 18  Therapy Diagnosis/Practice Pattern:  Practice Pattern A: Primary Prevention  Insurance/Certification information:  [facility]  Plan of care signed: [] YES  [x] NO  Number of Comorbidities:  []0     [x]1-2    []3+  Date of Patient follow up with Physician:     G-Code (if applicable):      Date G-Code Applied:  18  PT G-Codes  Functional Assessment Tool Used: LEFS  Score: 74%  Functional Limitation: Mobility: Walking and moving around  Mobility: Walking and Moving Around Current Status (): At least 60 percent but less than 80 percent impaired, limited or restricted  Mobility: Walking and Moving Around Goal Status (): At least 40 percent but less than 60 percent impaired, limited or restricted    Progress Note: [x]  Yes  []  No  Next due by: Visit #10        Latex Allergy:  [x]NO      []YES  Preferred Language for Healthcare:   [x]English       []other:    Visit # Insurance Allowable Reporting Period   5 12 visits Orthonet   (KAYDEN) Begin Date: 2018               End Date:      RECERT DUE BY: 12 weeks    SUBJECTIVE: Patient reports her knee is progressing slowly. She was a little sore over the weekend while at a family reunion, but was not in as much pain as she anticipated.  Patient indicated she feels it is more Patient Seen in: [facility] Emergency Department    History   Patient presents with:  Chest Pain Angina (cardiovascular)  Nausea/Vomiting/Diarrhea (gastrointestinal)    Stated Complaint: chest pain, nausea, diarrhea    HPI    60-year-old white male Take 15 mg by mouth every 4 (four) hours as needed for Pain. morphINE Sulfate ER 15 MG Oral Tab CR,  Take 15 mg by mouth 2 (two) times daily. CUSTOM MEDICATION,  Lidocaine 2.5%, Prilocaine 2.5% cream #480 grams.     Apply 4 grams three to four times santos stiff than pain. Pleased with progress. OBJECTIVE:   Observation: patient ambulating into clinic with cane   Palpation: decreased tightness of hamstrings and medial quad     Test used Initial score Current Score   Pain Summary VAS 5/10    Functional questionnaire LEFS 74%    ROM flexion 101 deg 108    extension 0 deg after stretching 0 upon arrival   Strength Quad Poor +     ABD      flexion          RESTRICTIONS/PRECAUTIONS: Osteopenia    Exercises/Interventions: MH x 5' before bike    Therapeutic Ex Sets/reps Notes   Long sitting HS stretch 4 x 20\" HEP   gastroc belt stretch 4 x 20\" HEP   Quad set 2 x 15 x 5\" HEP   Seated heel slide with belt/HS ball 2 X15 x 5\" HEP   LAQ 2 x 10   HEP   EOB knee AA flexion 10 x 5\" HEP   SLR  HEP   bike 7' Able to make revolutions both directions; low bike 15   clamshells X 10 Bilat; HEP   Piriformis stretch 3 x 20\"         HR/ TR X 20HEP   Mini Squats X20HEP   Side-step 1 lap HEP   FSU 4\" x 10 trialed 6\", but difficult        Manual Intervention     Patella mobs, PROM, gs/hs stretch X 8'    STM hamstrings, back of knee X 4' SL   SL quad stretch  Held 7/16   Extension mobs               NMR re-education                                                      Therapeutic Exercise and NMR EXR  [x] (19019) Provided verbal/tactile cueing for activities related to strengthening, flexibility, endurance, ROM for improvements in LE, proximal hip, and core control with self care, mobility, lifting, ambulation.  [] (37897) Provided verbal/tactile cueing for activities related to improving balance, coordination, kinesthetic sense, posture, motor skill, proprioception  to assist with LE, proximal hip, and core control in self care, mobility, lifting, ambulation and eccentric single leg control.      NMR and Therapeutic Activities:    [x] (49366 or 23618) Provided verbal/tactile cueing for activities related to improving balance, coordination, kinesthetic sense, posture, motor skill, systems are as noted in HPI. Constitutional and vital signs reviewed. All other systems reviewed and negative except as noted above. PSFH elements reviewed from today and agreed except as otherwise stated in HPI.     Physical Exam   ED Triage Vital normal limits   TROPONIN I - Normal   CBC WITH DIFFERENTIAL WITH PLATELET    Narrative: The following orders were created for panel order CBC WITH DIFFERENTIAL WITH PLATELET.   Procedure                               Abnormality         Status with T-wave inversions laterally and T-wave flattening inferiorly. When compared to EKG dated May 29 of 2017 these are new changes. I agree with computer report for rate axes and intervals.          Chest x-ray reveals:  FINDINGS:  Normal heart size and p ICD-10-CM Noted POA    Chest pain, rule out acute myocardial infarction R07.9 8/22/2017 Unknown

## 2018-08-28 ENCOUNTER — HOSPITAL ENCOUNTER (EMERGENCY)
Age: 53
Discharge: HOME | End: 2018-08-28
Attending: EMERGENCY MEDICINE
Payer: MEDICAID

## 2018-08-28 VITALS
HEIGHT: 74 IN | WEIGHT: 200 LBS | HEART RATE: 78 BPM | TEMPERATURE: 98 F | BODY MASS INDEX: 25.67 KG/M2 | SYSTOLIC BLOOD PRESSURE: 125 MMHG | OXYGEN SATURATION: 98 % | RESPIRATION RATE: 18 BRPM | DIASTOLIC BLOOD PRESSURE: 70 MMHG

## 2018-08-28 DIAGNOSIS — R73.9 HYPERGLYCEMIA: Primary | ICD-10-CM

## 2018-08-28 LAB
ALBUMIN SERPL-MCNC: 4 G/DL (ref 3.5–4.8)
ALBUMIN/GLOB SERPL: 1.2 {RATIO} (ref 1–2)
ALP LIVER SERPL-CCNC: 80 U/L (ref 45–117)
ALT SERPL-CCNC: 27 U/L (ref 17–63)
ANION GAP SERPL CALC-SCNC: 10 MMOL/L (ref 0–18)
AST SERPL-CCNC: 14 U/L (ref 15–41)
BASOPHILS # BLD AUTO: 0.16 X10(3) UL (ref 0–0.1)
BASOPHILS NFR BLD AUTO: 3 %
BILIRUB SERPL-MCNC: 0.5 MG/DL (ref 0.1–2)
BILIRUB UR QL STRIP.AUTO: NEGATIVE
BUN BLD-MCNC: 26 MG/DL (ref 8–20)
BUN/CREAT SERPL: 18.1 (ref 10–20)
CALCIUM BLD-MCNC: 8.8 MG/DL (ref 8.3–10.3)
CHLORIDE SERPL-SCNC: 94 MMOL/L (ref 101–111)
CLARITY UR REFRACT.AUTO: CLEAR
CO2 SERPL-SCNC: 26 MMOL/L (ref 22–32)
COLOR UR AUTO: YELLOW
CREAT BLD-MCNC: 1.44 MG/DL (ref 0.7–1.3)
EOSINOPHIL # BLD AUTO: 0.23 X10(3) UL (ref 0–0.3)
EOSINOPHIL NFR BLD AUTO: 4.3 %
ERYTHROCYTE [DISTWIDTH] IN BLOOD BY AUTOMATED COUNT: 12.5 % (ref 11.5–16)
GLOBULIN PLAS-MCNC: 3.4 G/DL (ref 2.5–4)
GLUCOSE BLD-MCNC: 271 MG/DL (ref 65–99)
GLUCOSE BLD-MCNC: 305 MG/DL (ref 65–99)
GLUCOSE BLD-MCNC: 454 MG/DL (ref 65–99)
GLUCOSE BLD-MCNC: 475 MG/DL (ref 70–99)
GLUCOSE UR STRIP.AUTO-MCNC: 500 MG/DL
HAV IGM SER QL: 1.9 MG/DL (ref 1.8–2.5)
HCT VFR BLD AUTO: 43.7 % (ref 37–53)
HGB BLD-MCNC: 14.8 G/DL (ref 13–17)
IMMATURE GRANULOCYTE COUNT: 0.01 X10(3) UL (ref 0–1)
IMMATURE GRANULOCYTE RATIO %: 0.2 %
ISTAT BLOOD GAS BASE DEFICIT: -1 MMOL/L
ISTAT BLOOD GAS HCO3: 24.5 MEQ/L (ref 22–26)
ISTAT BLOOD GAS O2 SATURATION: 82 % (ref 92–100)
ISTAT BLOOD GAS PCO2: 42 MMHG (ref 35–45)
ISTAT BLOOD GAS PH: 7.37 (ref 7.35–7.45)
ISTAT BLOOD GAS PO2: <70 MMHG (ref 80–105)
ISTAT BLOOD GAS TCO2: 26 MMOL/L (ref 22–32)
KETONES UR STRIP.AUTO-MCNC: 40 MG/DL
LEUKOCYTE ESTERASE UR QL STRIP.AUTO: NEGATIVE
LIPASE: 58 U/L (ref 73–393)
LYMPHOCYTES # BLD AUTO: 1.7 X10(3) UL (ref 0.9–4)
LYMPHOCYTES NFR BLD AUTO: 32 %
M PROTEIN MFR SERPL ELPH: 7.4 G/DL (ref 6.1–8.3)
MCH RBC QN AUTO: 30.1 PG (ref 27–33.2)
MCHC RBC AUTO-ENTMCNC: 33.9 G/DL (ref 31–37)
MCV RBC AUTO: 88.8 FL (ref 80–99)
MONOCYTES # BLD AUTO: 0.63 X10(3) UL (ref 0.1–1)
MONOCYTES NFR BLD AUTO: 11.9 %
NEUTROPHIL ABS PRELIM: 2.58 X10 (3) UL (ref 1.3–6.7)
NEUTROPHILS # BLD AUTO: 2.58 X10(3) UL (ref 1.3–6.7)
NEUTROPHILS NFR BLD AUTO: 48.6 %
NITRITE UR QL STRIP.AUTO: NEGATIVE
OSMOLALITY SERPL CALC.SUM OF ELEC: 296 MOSM/KG (ref 275–295)
PH UR STRIP.AUTO: 5 [PH] (ref 4.5–8)
PHOSPHATE SERPL-MCNC: 3.4 MG/DL (ref 2.5–4.9)
PLATELET # BLD AUTO: 208 10(3)UL (ref 150–450)
POTASSIUM SERPL-SCNC: 4.3 MMOL/L (ref 3.6–5.1)
PROT UR STRIP.AUTO-MCNC: NEGATIVE MG/DL
RBC # BLD AUTO: 4.92 X10(6)UL (ref 4.3–5.7)
RBC UR QL AUTO: NEGATIVE
RED CELL DISTRIBUTION WIDTH-SD: 40.8 FL (ref 35.1–46.3)
SODIUM SERPL-SCNC: 130 MMOL/L (ref 136–144)
SP GR UR STRIP.AUTO: 1.01 (ref 1–1.03)
UROBILINOGEN UR STRIP.AUTO-MCNC: 0.2 MG/DL
WBC # BLD AUTO: 5.3 X10(3) UL (ref 4–13)

## 2018-08-28 PROCEDURE — 99284 EMERGENCY DEPT VISIT MOD MDM: CPT

## 2018-08-28 PROCEDURE — 80053 COMPREHEN METABOLIC PANEL: CPT | Performed by: EMERGENCY MEDICINE

## 2018-08-28 PROCEDURE — 84100 ASSAY OF PHOSPHORUS: CPT | Performed by: EMERGENCY MEDICINE

## 2018-08-28 PROCEDURE — 96360 HYDRATION IV INFUSION INIT: CPT

## 2018-08-28 PROCEDURE — 85025 COMPLETE CBC W/AUTO DIFF WBC: CPT | Performed by: EMERGENCY MEDICINE

## 2018-08-28 PROCEDURE — 83690 ASSAY OF LIPASE: CPT | Performed by: EMERGENCY MEDICINE

## 2018-08-28 PROCEDURE — 96361 HYDRATE IV INFUSION ADD-ON: CPT

## 2018-08-28 PROCEDURE — 82962 GLUCOSE BLOOD TEST: CPT

## 2018-08-28 PROCEDURE — 82010 KETONE BODYS QUAN: CPT | Performed by: EMERGENCY MEDICINE

## 2018-08-28 PROCEDURE — 96372 THER/PROPH/DIAG INJ SC/IM: CPT

## 2018-08-28 PROCEDURE — 83735 ASSAY OF MAGNESIUM: CPT | Performed by: EMERGENCY MEDICINE

## 2018-08-28 PROCEDURE — 81003 URINALYSIS AUTO W/O SCOPE: CPT | Performed by: EMERGENCY MEDICINE

## 2018-08-28 PROCEDURE — 82803 BLOOD GASES ANY COMBINATION: CPT

## 2018-08-28 RX ORDER — INSULIN ASPART 100 [IU]/ML
0.1 INJECTION, SOLUTION INTRAVENOUS; SUBCUTANEOUS ONCE
Status: COMPLETED | OUTPATIENT
Start: 2018-08-28 | End: 2018-08-28

## 2018-08-29 NOTE — ED PROVIDER NOTES
Patient Seen in: [facility] In [place]    History   Patient presents with:  Hyperglycemia (metabolic)    Stated Complaint: elevated BS; r/o DKA    HPI    Patient is a 60-year-old male with complex medical history including insulin-dep SPINE SURGERY PROCEDURE UNLISTED      Comment: Posterior cervical laminectomy, Dr. Ayah Tejdaa        Smoking status: Current Every Day Smoker                                                   Packs/day: 0.50      Years: 5.00         Types: Cigarettes     Start 1.44 (*)     Calculated Osmolality 296 (*)     GFR, Non- 56 (*)     AST 14 (*)     All other components within normal limits   URINALYSIS WITH CULTURE REFLEX - Abnormal; Notable for the following:     Glucose Urine 500  (*)     Ketones Urin greater than 500. He feels vaguely weak and fatigued. He suspects that his insulin pump is not working because this has happened to him before. His vitals are normal on arrival with no tachycardia, and he does not appear significantly dehydrated.   [facility]

## 2018-08-29 NOTE — RESPIRATORY THERAPY NOTE
i-STAT Testing  Site Vein  Time 2032  Navjot's test result N/A    Clinical data  PH 7.37  PCO2 42.4  PO2 48  BE -1  HCO3 24.5  TCO2 26  sO2 82%

## 2018-08-31 LAB — BETA-HYDROXYBUTYRIC ACID: 12.7 MG/DL

## 2018-09-14 PROCEDURE — 82787 IGG 1 2 3 OR 4 EACH: CPT | Performed by: INTERNAL MEDICINE

## 2018-09-14 PROCEDURE — 81479 UNLISTED MOLECULAR PATHOLOGY: CPT | Performed by: INTERNAL MEDICINE

## 2018-09-14 PROCEDURE — 81223 CFTR GENE FULL SEQUENCE: CPT | Performed by: INTERNAL MEDICINE

## 2018-09-14 PROCEDURE — 36415 COLL VENOUS BLD VENIPUNCTURE: CPT | Performed by: INTERNAL MEDICINE

## 2018-09-14 PROCEDURE — 81404 MOPATH PROCEDURE LEVEL 5: CPT | Performed by: INTERNAL MEDICINE

## 2018-09-18 PROBLEM — E10.42 TYPE 1 DIABETES MELLITUS WITH DIABETIC POLYNEUROPATHY (HCC): Status: RESOLVED | Noted: 2018-05-09 | Resolved: 2018-09-18

## 2018-10-22 ENCOUNTER — HOSPITAL ENCOUNTER (INPATIENT)
Facility: HOSPITAL | Age: 53
LOS: 2 days | Discharge: HOME | DRG: 638 | End: 2018-10-24
Attending: EMERGENCY MEDICINE | Admitting: INTERNAL MEDICINE
Payer: MEDICAID

## 2018-10-22 ENCOUNTER — APPOINTMENT (OUTPATIENT)
Dept: GENERAL RADIOLOGY | Age: 53
DRG: 638 | End: 2018-10-22
Attending: NURSE PRACTITIONER
Payer: MEDICAID

## 2018-10-22 DIAGNOSIS — E10.10 TYPE 1 DIABETES MELLITUS WITH KETOACIDOSIS WITHOUT COMA (HCC): Primary | ICD-10-CM

## 2018-10-22 DIAGNOSIS — J06.9 UPPER RESPIRATORY TRACT INFECTION, UNSPECIFIED TYPE: ICD-10-CM

## 2018-10-22 PROBLEM — E11.10 DKA (DIABETIC KETOACIDOSES): Status: ACTIVE | Noted: 2018-10-22

## 2018-10-22 PROBLEM — R79.89 AZOTEMIA: Status: ACTIVE | Noted: 2018-10-22

## 2018-10-22 PROBLEM — R73.9 HYPERGLYCEMIA: Status: ACTIVE | Noted: 2018-10-22

## 2018-10-22 PROBLEM — E87.1 HYPONATREMIA: Status: ACTIVE | Noted: 2018-10-22

## 2018-10-22 PROCEDURE — 71045 X-RAY EXAM CHEST 1 VIEW: CPT | Performed by: NURSE PRACTITIONER

## 2018-10-22 PROCEDURE — 99291 CRITICAL CARE FIRST HOUR: CPT | Performed by: NURSE PRACTITIONER

## 2018-10-22 RX ORDER — SODIUM CHLORIDE 9 MG/ML
INJECTION, SOLUTION INTRAVENOUS CONTINUOUS
Status: DISCONTINUED | OUTPATIENT
Start: 2018-10-22 | End: 2018-10-22

## 2018-10-22 RX ORDER — DEXTROSE MONOHYDRATE 25 G/50ML
50 INJECTION, SOLUTION INTRAVENOUS
Status: DISCONTINUED | OUTPATIENT
Start: 2018-10-22 | End: 2018-10-24

## 2018-10-22 RX ORDER — HYDROCODONE BITARTRATE AND ACETAMINOPHEN 5; 325 MG/1; MG/1
1 TABLET ORAL ONCE
Status: COMPLETED | OUTPATIENT
Start: 2018-10-22 | End: 2018-10-22

## 2018-10-22 RX ORDER — ENOXAPARIN SODIUM 100 MG/ML
40 INJECTION SUBCUTANEOUS NIGHTLY
Status: DISCONTINUED | OUTPATIENT
Start: 2018-10-22 | End: 2018-10-24

## 2018-10-22 RX ORDER — SODIUM CHLORIDE 9 MG/ML
INJECTION, SOLUTION INTRAVENOUS ONCE
Status: COMPLETED | OUTPATIENT
Start: 2018-10-22 | End: 2018-10-22

## 2018-10-22 RX ORDER — IPRATROPIUM BROMIDE AND ALBUTEROL SULFATE 2.5; .5 MG/3ML; MG/3ML
3 SOLUTION RESPIRATORY (INHALATION) ONCE
Status: COMPLETED | OUTPATIENT
Start: 2018-10-22 | End: 2018-10-22

## 2018-10-22 RX ORDER — ACETAMINOPHEN 325 MG/1
650 TABLET ORAL EVERY 6 HOURS PRN
Status: DISCONTINUED | OUTPATIENT
Start: 2018-10-22 | End: 2018-10-24

## 2018-10-22 RX ORDER — METOCLOPRAMIDE HYDROCHLORIDE 5 MG/ML
10 INJECTION INTRAMUSCULAR; INTRAVENOUS EVERY 8 HOURS PRN
Status: DISCONTINUED | OUTPATIENT
Start: 2018-10-22 | End: 2018-10-24

## 2018-10-22 RX ORDER — ONDANSETRON 2 MG/ML
4 INJECTION INTRAMUSCULAR; INTRAVENOUS EVERY 4 HOURS PRN
Status: DISCONTINUED | OUTPATIENT
Start: 2018-10-22 | End: 2018-10-22

## 2018-10-22 RX ORDER — SODIUM CHLORIDE 9 MG/ML
INJECTION, SOLUTION INTRAVENOUS CONTINUOUS
Status: DISCONTINUED | OUTPATIENT
Start: 2018-10-23 | End: 2018-10-24

## 2018-10-22 RX ORDER — SODIUM CHLORIDE 9 MG/ML
INJECTION, SOLUTION INTRAVENOUS CONTINUOUS
Status: ACTIVE | OUTPATIENT
Start: 2018-10-22 | End: 2018-10-23

## 2018-10-22 RX ORDER — ONDANSETRON 2 MG/ML
4 INJECTION INTRAMUSCULAR; INTRAVENOUS EVERY 6 HOURS PRN
Status: DISCONTINUED | OUTPATIENT
Start: 2018-10-22 | End: 2018-10-24

## 2018-10-22 RX ORDER — DEXTROSE AND SODIUM CHLORIDE 5; .9 G/100ML; G/100ML
INJECTION, SOLUTION INTRAVENOUS CONTINUOUS
Status: DISCONTINUED | OUTPATIENT
Start: 2018-10-22 | End: 2018-10-23

## 2018-10-22 NOTE — ED NOTES
I reviewed that chart and discussed the case. I have examined the patient and noted patient is a 60-year-old male who presents emergency room with a history of being sick for the last several days.   The patient has had cough and cold symptoms which have b motor or sensory deficits appreciated. Cranial nerves II through XII are intact. Patient is answering all questions appropriately.         Xr Chest Ap Portable  (cpt=71045)    Result Date: 10/22/2018  CONCLUSION:  Negative chest.     Dictated by: Tena Litten

## 2018-10-22 NOTE — ED PROVIDER NOTES
Patient Seen in: THE [facility] Emergency Department In [place]    History   Patient presents with:  Cough/URI  Fatigue (constitutional, neurologic)    Stated Complaint: cough    52-year-old male who presents to the emergency room with complaints of worsening • Rheumatoid arthritis(714.0)    • Sleep apnea     pt wears oral device CPAP in past   • Type 1 diabetes ([facility]     Dx at age 40   • Type 1 diabetes mellitus with diabetic polyneuropathy ([facility] 5/9/2018       Past Surgical History:   Procedure Laterality D other systems reviewed and negative except as noted above.     Physical Exam     ED Triage Vitals [10/22/18 1647]   /86   Pulse 95   Resp 18   Temp 98.3 °F (36.8 °C)   Temp src Temporal   SpO2 94 %   O2 Device None (Room air)       Current:/81 check CBC, CMP, lipase, troponin, lactic acid, blood culture x2, ABG, urinalysis and will get a chest x-ray PA and lateral.  Labs Reviewed   COMP METABOLIC PANEL (14) - Abnormal; Notable for the following components:       Result Value    Glucose 331 (*) result                 Please view results for these tests on the individual orders.    ARTERIAL BLOOD GAS   BLOOD CULTURE   BLOOD CULTURE   RESPIRATORY PANEL FLU EXPANDED              MDM     Admission disposition: 10/22/2018  6:57 PM                 Dispo

## 2018-10-23 ENCOUNTER — APPOINTMENT (OUTPATIENT)
Dept: GENERAL RADIOLOGY | Facility: HOSPITAL | Age: 53
DRG: 638 | End: 2018-10-23
Attending: INTERNAL MEDICINE
Payer: MEDICAID

## 2018-10-23 PROCEDURE — 71045 X-RAY EXAM CHEST 1 VIEW: CPT | Performed by: INTERNAL MEDICINE

## 2018-10-23 RX ORDER — BUSPIRONE HYDROCHLORIDE 5 MG/1
20 TABLET ORAL 3 TIMES DAILY
Status: DISCONTINUED | OUTPATIENT
Start: 2018-10-23 | End: 2018-10-24

## 2018-10-23 RX ORDER — METOPROLOL SUCCINATE 50 MG/1
50 TABLET, EXTENDED RELEASE ORAL
Status: DISCONTINUED | OUTPATIENT
Start: 2018-10-23 | End: 2018-10-24

## 2018-10-23 RX ORDER — FLUOXETINE HYDROCHLORIDE 20 MG/1
80 CAPSULE ORAL DAILY
Status: DISCONTINUED | OUTPATIENT
Start: 2018-10-23 | End: 2018-10-24

## 2018-10-23 RX ORDER — POTASSIUM CHLORIDE 20 MEQ/1
40 TABLET, EXTENDED RELEASE ORAL EVERY 4 HOURS
Status: COMPLETED | OUTPATIENT
Start: 2018-10-23 | End: 2018-10-23

## 2018-10-23 RX ORDER — PANTOPRAZOLE SODIUM 40 MG/1
40 TABLET, DELAYED RELEASE ORAL
Status: DISCONTINUED | OUTPATIENT
Start: 2018-10-23 | End: 2018-10-24

## 2018-10-23 RX ORDER — HYDROCODONE BITARTRATE AND ACETAMINOPHEN 10; 325 MG/1; MG/1
1 TABLET ORAL 2 TIMES DAILY PRN
Status: DISCONTINUED | OUTPATIENT
Start: 2018-10-23 | End: 2018-10-24

## 2018-10-23 RX ORDER — CLONAZEPAM 0.5 MG/1
0.5 TABLET ORAL 2 TIMES DAILY PRN
Status: DISCONTINUED | OUTPATIENT
Start: 2018-10-23 | End: 2018-10-24

## 2018-10-23 RX ORDER — IPRATROPIUM BROMIDE AND ALBUTEROL SULFATE 2.5; .5 MG/3ML; MG/3ML
3 SOLUTION RESPIRATORY (INHALATION) EVERY 4 HOURS PRN
Status: DISCONTINUED | OUTPATIENT
Start: 2018-10-23 | End: 2018-10-24

## 2018-10-23 RX ORDER — FLUTICASONE PROPIONATE 50 MCG
2 SPRAY, SUSPENSION (ML) NASAL DAILY
Status: DISCONTINUED | OUTPATIENT
Start: 2018-10-23 | End: 2018-10-24

## 2018-10-23 RX ORDER — MIRTAZAPINE 15 MG/1
45 TABLET, FILM COATED ORAL NIGHTLY
Status: DISCONTINUED | OUTPATIENT
Start: 2018-10-23 | End: 2018-10-24

## 2018-10-23 NOTE — PAYOR COMM NOTE
--------------  ADMISSION REVIEW     Payor: Eduardo Foy #:  [id number]  Authorization Number: [id number]    Admit date: 10/22/18  Admit time: 2129       Admitting Physician: Alyssa Raymond MD  Attending Phys reviewed and agreed (except as otherwise stated in the HPI). The patient's family history reviewed and determined to be noncontributory to the presenting problem.               Past Medical History:   Diagnosis Date   • Acute renal failure ([facility]    • Alcoh use: No      Alcohol/week: 0.0 oz      Comment: no alcohol in over 3 yrs     Drug use: No      Comment: cocaine; Review of Systems   Constitutional: Positive for chills and fatigue. Negative for fever.    HENT: Positive for congestion and postnasal dr He has wheezes in the right upper field and the left upper field. He has rhonchi in the right lower field and the left lower field. Abdominal: Soft. Bowel sounds are normal. He exhibits no distension and no mass. There is no tenderness.  There is no rebou POC Glucose 324 (*)     All other components within normal limits   CBC W/ DIFFERENTIAL - Abnormal; Notable for the following components:    Neutrophil Absolute Prelim 8.50 (*)     Neutrophil Absolute 8.50 (*)     All other components within normal limi Sodium (LOVENOX) 40 MG/0.4ML injection 40 mg     Date Action Dose Route User    10/22/2018 2300 Given 40 mg Subcutaneous (Left Upper Arm) Jaz Anguiano RN      HYDROcodone-acetaminophen (NORCO) 5-325 MG per tab 1 tablet     Date Action Dose Route Use nebulizer solution 3 mL     Date Action Dose Route User    10/22/2018 1717 Given 3 mL Nebulization Ulysses  F, RCP      0.9%  NaCl infusion     Date Action Dose Route User    10/22/2018 1849 New Bag (none) Intravenous Nanette Smart RN      0.9%  NaCl

## 2018-10-23 NOTE — CONSULTS
[facility]    PATIENT'S NAME: Jenny Smith   ATTENDING PHYSICIAN: FILIBERTO Richter: Michael Julian M.D.    PATIENT ACCOUNT#:   [de-identified]    LOCATION:  [address] [place] [address]  MEDICAL RECORD #:   [id number]       DATE Bud Carrillo cyclobenzaprine, pantoprazole 40 mg daily, metoprolol ER 25 mg daily, Flonase, omeprazole, BuSpar 10 mg, mirtazapine 45 mg daily, clonazepam 0.5 mg b.i.d. ALLERGIES:  Penicillin and shell fish. FAMILY HISTORY:  Mom with hypertension.   Father had diab [address] [id number]  MD/

## 2018-10-23 NOTE — H&P
[facility] Hospitalist History and Physical      Patient presents with:  Cough/URI  Fatigue (constitutional, neurologic)       PCP: Arsenio Gavin MD      History of Present Illness: Patient is a 47year old male with PMH sig for chronic back pain w intrath SURGERY   • OTHER SURGICAL HISTORY  10/12    left knee scoped   • SPINE SURGERY PROCEDURE UNLISTED  8/2016    Posterior cervical laminectomy, Dr. Steve Dooley        ALL:    Penicillin G Benzat*        Comment:Redness and swelling in the hands  Shellfish-Derived sounds normal. No masses,  No organomegaly. Non distended   Extremities: Extremities normal, atraumatic, no cyanosis or edema. Skin: Skin color, texture, turgor normal. No rashes or lesions.     Neurologic: Normal strength, no focal deficit appreciated

## 2018-10-23 NOTE — PROGRESS NOTES
ICU  Critical Care APRN H & P    NAME: Dane Walden - ROOM: [address] [place], [place] [address]A - MRN: [id number] - Age: 47year old - :1965    History Of Present Illness:  Dane Walden is a 47year old male with PMHx significant for chronic lumbar, cervical pain--r organism    • PTSD (post-traumatic stress disorder)    • Rheumatoid arthritis(714.0)    • Sleep apnea     pt wears oral device CPAP in past   • Type 1 diabetes ([facility]     Dx at age 40   • Type 1 diabetes mellitus with diabetic polyneuropathy ([facility] 5/9/2018 and rhythm, S1 and S2 normal, no murmur, rub or gallop  Abdomen: Soft, non-tender, bowel sounds active all four quadrants, no masses, no organomegaly, intrathecal pump palpable to RLQ  Extremities: Extremities normal, atraumatic, no cyanosis or edema,capil insulin gtt  -Follow electrolytes and replete as indicated  -ice and sips--advance with endocrines okay    Proph:  -SQ lovenox  -SCD    Dispo:   -Full code  -ICU for insulin gtt management    Plan of care discussed with intensivist on-call, Dr Martha Jacobs

## 2018-10-23 NOTE — PLAN OF CARE
METABOLIC/FLUID AND ELECTROLYTES - ADULT    • Glucose maintained within prescribed range Progressing    • Electrolytes maintained within normal limits Progressing        PAIN - ADULT    • Verbalizes/displays adequate comfort level or patient's stated pain

## 2018-10-23 NOTE — CONSULTS
[address] Patient Status:  Inpatient    1965 MRN [id number]   [facility] 4SW-A Attending Bety Shah MD   [facility] Day # 1 PCP Miguelina Pike MD     Date of Admission: 10/22/2018  Admission Diagno 11/14/2017    Procedure: COLONOSCOPY;  Surgeon: Mela Ogden MD;  Location: [facility] ENDOSCOPY   • COLONOSCOPY N/A 11/14/2017    Performed by Mela Ogden MD at [facility] ENDOSCOPY   • EGD  11/14/2017   • ESOPHAGOGASTRODUODENOSCOPY (EGD) N/A 11/14/2017 42 UNITS UNDER THE SKIN DAILY IN CASE OF PUMP MALFUNCTION Disp: 10 pen Rfl: 1   Cyclobenzaprine HCl 10 MG Oral Tab Take by mouth. Disp:  Rfl:    Pantoprazole Sodium 40 MG Oral Tab EC Take 40 mg by mouth 2 (two) times daily.  Disp:  Rfl:    Metoprolol Succin Subcutaneous, TID CC and HS  •  Insulin Aspart Pen (NOVOLOG) 100 UNIT/ML flexpen 1-20 Units, 1-20 Units, Subcutaneous, TID CC and HS  •  Potassium Chloride ER (K-DUR M20) CR tab 40 mEq, 40 mEq, Oral, Q4H  •  Enoxaparin Sodium (LOVENOX) 40 MG/0.4ML injectio OBJECTIVE:  /77   Pulse 67   Temp 97.3 °F (36.3 °C) (Temporal)   Resp 12   Ht 6' 2\" (1.88 m)   Wt 175 lb 7.8 oz (79.6 kg)   SpO2 97%   BMI 22.53 kg/m²      Ventilator Settings: 2L      Wt Readings from Last 3 Encounters:  10/22/18 : 175 lb 7.8 oz cardiopulmonary process identified     ASSESSMENT/PLAN:  1. Diabetic ketoacidosis: 2/2 medication noncompliance in setting of viral URI   -IVF  -gap closed and pt now off insulin gtt and transitioned to SQ insulin.   Patient does not want to resume his insu

## 2018-10-24 VITALS
DIASTOLIC BLOOD PRESSURE: 92 MMHG | SYSTOLIC BLOOD PRESSURE: 142 MMHG | OXYGEN SATURATION: 97 % | RESPIRATION RATE: 16 BRPM | TEMPERATURE: 98 F | HEIGHT: 74 IN | HEART RATE: 64 BPM | BODY MASS INDEX: 22.52 KG/M2 | WEIGHT: 175.5 LBS

## 2018-10-24 RX ORDER — METOPROLOL SUCCINATE 50 MG/1
50 TABLET, EXTENDED RELEASE ORAL DAILY
Qty: 30 TABLET | Refills: 0 | Status: ON HOLD | OUTPATIENT
Start: 2018-10-24 | End: 2019-03-24

## 2018-10-24 NOTE — PROGRESS NOTES
Pulmonary Progress Note        NAME: Ele Naranjo - ROOM: 520/520-A - MRN: [id number] - Age: 47year old - : 1965        Last 24hrs: No events overnight, feels better this AM, states that when he wears O2 at night he feels better rested    OBJE 10/23/18   0424  10/24/18   0657   WBC  11.2  6.8  4.3   HGB  15.1  12.5*  12.7*   MCV  90.1  88.2  88.6   PLT  212.0  152.0  148.0*       Recent Labs      10/22/18   1701  10/22/18   2217  10/23/18   0424  10/24/18   0657   NA  131*  134*  138  138   K  4

## 2018-10-24 NOTE — PAYOR COMM NOTE
--------------  DISCHARGE REVIEW    Payor: Eduardo Foy #:  [id number]  Authorization Number: [id number]    Admit date: 10/22/18  Admit time:  2129  Discharge Date: 10/24/2018 12:49 PM     Admitting Physician: Nadine Sal DKA  - improved overnight w IVF and insulin per endo recs  - to resume insulin pump on dc     Entero/rhino virus  - supportive care  - no clinical evidence of pna     HTN  - cont home meds     Depression/ptsd  - cont home meds            Consults: KI NARAYAN cream #480 grams. Apply 4 grams three to four times daily for treatment of pain (PAINNORM)    mirtazapine 45 MG Oral Tab  Take 45 mg by mouth nightly. ClonazePAM 0.5 MG Oral Tab  Take 0.5 mg by mouth 2 (two) times daily as needed for Anxiety.     BA

## 2018-10-24 NOTE — PLAN OF CARE
METABOLIC/FLUID AND ELECTROLYTES - ADULT    • Glucose maintained within prescribed range Adequate for Discharge    • Electrolytes maintained within normal limits Adequate for Discharge        PAIN - ADULT    • Verbalizes/displays adequate comfort level or

## 2018-10-24 NOTE — PROGRESS NOTES
[facility]  Progress Note    Radha Rice Patient Status:  Inpatient    1965 MRN [id number]   [facility] 5NW-A Attending Elida Robert MD   Hosp Day # 2 PCP Joyce Butler MD     Assessment/Plan:  Patient Active primary service. Subjective:  No complaints      Objective/Physical Exam:  Physical Exam:     General: Alert. Cooperative. No apparent distress.   Vital Signs:  Blood pressure (!) 142/92, pulse 64, temperature 98.2 °F (36.8 °C), temperature source Or

## 2018-10-24 NOTE — PROGRESS NOTES
Spoke with Dr. Kristen Best, stated that patient was okay to DC from her perspective as long as patient put himself back on his insulin pump.

## 2018-10-24 NOTE — DISCHARGE SUMMARY
General Medicine Discharge Summary     Patient ID:  Gabe Del Rio  47year old  11/16/1965    Admit date: 10/22/2018    Discharge date and time: 10/24/2018.      Attending Physician: Ro Caba times daily as needed for Pain.     insulin aspart (NOVOLOG) 100 UNIT/ML Subcutaneous Solution  INJECT SUBCUTANEOUS AS DIRECTED WITH MEAL MAX OF 50 UNITS PER DAY    FLUoxetine HCl 20 MG Oral Cap  TK 4 CS PO D    BASAGLAR KWIKPEN 100 UNIT/ML Subcutaneous Sol CN inact, no focal deficits      Total time coordinating care for discharge: Greater than 30 minutes    . [facility]  [facility]  [phone]

## 2018-10-24 NOTE — PROGRESS NOTES
NURSING DISCHARGE NOTE    Discharged Home via Ambulatory. Accompanied by Family member  Belongings Taken by patient/family. Pt assessed and ready for dc. Instructions gone over with patient, no questions or concerns at this time. Iv dc'd.  To matthias

## 2018-11-02 VITALS
TEMPERATURE: 97.8 F | WEIGHT: 193.19 LBS | DIASTOLIC BLOOD PRESSURE: 81 MMHG | RESPIRATION RATE: 16 BRPM | HEIGHT: 72 IN | SYSTOLIC BLOOD PRESSURE: 101 MMHG | BODY MASS INDEX: 26.17 KG/M2 | HEART RATE: 82 BPM

## 2018-11-02 VITALS
BODY MASS INDEX: 26.3 KG/M2 | HEIGHT: 72 IN | TEMPERATURE: 98.2 F | RESPIRATION RATE: 16 BRPM | WEIGHT: 194.19 LBS | SYSTOLIC BLOOD PRESSURE: 114 MMHG | DIASTOLIC BLOOD PRESSURE: 75 MMHG | HEART RATE: 100 BPM

## 2018-11-02 VITALS
HEIGHT: 72 IN | SYSTOLIC BLOOD PRESSURE: 124 MMHG | OXYGEN SATURATION: 98 % | HEART RATE: 112 BPM | DIASTOLIC BLOOD PRESSURE: 88 MMHG | WEIGHT: 197 LBS | BODY MASS INDEX: 26.68 KG/M2 | TEMPERATURE: 97.8 F | RESPIRATION RATE: 18 BRPM

## 2018-11-03 VITALS
BODY MASS INDEX: 27.94 KG/M2 | HEIGHT: 72 IN | TEMPERATURE: 97.8 F | OXYGEN SATURATION: 97 % | WEIGHT: 206.31 LBS | HEART RATE: 112 BPM | RESPIRATION RATE: 18 BRPM

## 2018-11-03 VITALS
WEIGHT: 204.44 LBS | DIASTOLIC BLOOD PRESSURE: 88 MMHG | SYSTOLIC BLOOD PRESSURE: 128 MMHG | RESPIRATION RATE: 16 BRPM | HEIGHT: 72 IN | HEART RATE: 101 BPM | BODY MASS INDEX: 27.69 KG/M2 | TEMPERATURE: 97.7 F

## 2018-11-04 VITALS
DIASTOLIC BLOOD PRESSURE: 88 MMHG | WEIGHT: 203.56 LBS | BODY MASS INDEX: 27.57 KG/M2 | TEMPERATURE: 98 F | SYSTOLIC BLOOD PRESSURE: 114 MMHG | HEIGHT: 72 IN | RESPIRATION RATE: 18 BRPM | HEART RATE: 110 BPM

## 2018-11-05 VITALS
SYSTOLIC BLOOD PRESSURE: 140 MMHG | HEART RATE: 81 BPM | TEMPERATURE: 97.9 F | RESPIRATION RATE: 16 BRPM | BODY MASS INDEX: 28.17 KG/M2 | DIASTOLIC BLOOD PRESSURE: 83 MMHG | WEIGHT: 208 LBS | HEIGHT: 72 IN

## 2018-11-05 VITALS
HEART RATE: 106 BPM | OXYGEN SATURATION: 96 % | BODY MASS INDEX: 27.8 KG/M2 | DIASTOLIC BLOOD PRESSURE: 80 MMHG | RESPIRATION RATE: 18 BRPM | SYSTOLIC BLOOD PRESSURE: 110 MMHG | WEIGHT: 205.25 LBS | TEMPERATURE: 98.1 F | HEIGHT: 72 IN

## 2018-11-05 VITALS
DIASTOLIC BLOOD PRESSURE: 95 MMHG | HEART RATE: 87 BPM | BODY MASS INDEX: 27.23 KG/M2 | HEIGHT: 72 IN | WEIGHT: 201.06 LBS | SYSTOLIC BLOOD PRESSURE: 125 MMHG

## 2018-11-05 VITALS
HEART RATE: 91 BPM | DIASTOLIC BLOOD PRESSURE: 86 MMHG | BODY MASS INDEX: 27.01 KG/M2 | HEIGHT: 72 IN | TEMPERATURE: 97.5 F | RESPIRATION RATE: 16 BRPM | WEIGHT: 199.44 LBS | SYSTOLIC BLOOD PRESSURE: 118 MMHG

## 2018-11-05 VITALS
HEIGHT: 72 IN | HEART RATE: 96 BPM | SYSTOLIC BLOOD PRESSURE: 114 MMHG | WEIGHT: 198.56 LBS | RESPIRATION RATE: 16 BRPM | TEMPERATURE: 97.8 F | BODY MASS INDEX: 26.9 KG/M2 | DIASTOLIC BLOOD PRESSURE: 89 MMHG

## 2018-12-05 ENCOUNTER — HOSPITAL ENCOUNTER (OUTPATIENT)
Facility: HOSPITAL | Age: 53
Setting detail: OBSERVATION
Discharge: HOME | End: 2018-12-08
Attending: EMERGENCY MEDICINE | Admitting: INTERNAL MEDICINE
Payer: MEDICAID

## 2018-12-05 ENCOUNTER — APPOINTMENT (OUTPATIENT)
Dept: GENERAL RADIOLOGY | Age: 53
End: 2018-12-05
Attending: EMERGENCY MEDICINE
Payer: MEDICAID

## 2018-12-05 ENCOUNTER — APPOINTMENT (OUTPATIENT)
Dept: CT IMAGING | Age: 53
End: 2018-12-05
Attending: EMERGENCY MEDICINE
Payer: MEDICAID

## 2018-12-05 DIAGNOSIS — J18.9 COMMUNITY ACQUIRED PNEUMONIA, UNSPECIFIED LATERALITY: Primary | ICD-10-CM

## 2018-12-05 DIAGNOSIS — R73.9 HYPERGLYCEMIA: ICD-10-CM

## 2018-12-05 DIAGNOSIS — K52.9 FREQUENT STOOLS: ICD-10-CM

## 2018-12-05 PROCEDURE — 93010 ELECTROCARDIOGRAM REPORT: CPT

## 2018-12-05 PROCEDURE — 83516 IMMUNOASSAY NONANTIBODY: CPT | Performed by: INTERNAL MEDICINE

## 2018-12-05 PROCEDURE — 84484 ASSAY OF TROPONIN QUANT: CPT | Performed by: EMERGENCY MEDICINE

## 2018-12-05 PROCEDURE — 74177 CT ABD & PELVIS W/CONTRAST: CPT | Performed by: EMERGENCY MEDICINE

## 2018-12-05 PROCEDURE — 96361 HYDRATE IV INFUSION ADD-ON: CPT

## 2018-12-05 PROCEDURE — 96365 THER/PROPH/DIAG IV INF INIT: CPT

## 2018-12-05 PROCEDURE — 82962 GLUCOSE BLOOD TEST: CPT

## 2018-12-05 PROCEDURE — 81003 URINALYSIS AUTO W/O SCOPE: CPT | Performed by: EMERGENCY MEDICINE

## 2018-12-05 PROCEDURE — 99285 EMERGENCY DEPT VISIT HI MDM: CPT

## 2018-12-05 PROCEDURE — 93005 ELECTROCARDIOGRAM TRACING: CPT

## 2018-12-05 PROCEDURE — 71045 X-RAY EXAM CHEST 1 VIEW: CPT | Performed by: EMERGENCY MEDICINE

## 2018-12-05 PROCEDURE — 80053 COMPREHEN METABOLIC PANEL: CPT | Performed by: EMERGENCY MEDICINE

## 2018-12-05 PROCEDURE — 84145 PROCALCITONIN (PCT): CPT | Performed by: INTERNAL MEDICINE

## 2018-12-05 PROCEDURE — 96375 TX/PRO/DX INJ NEW DRUG ADDON: CPT

## 2018-12-05 PROCEDURE — 85025 COMPLETE CBC W/AUTO DIFF WBC: CPT | Performed by: EMERGENCY MEDICINE

## 2018-12-05 PROCEDURE — 82009 KETONE BODYS QUAL: CPT | Performed by: EMERGENCY MEDICINE

## 2018-12-05 RX ORDER — METOPROLOL SUCCINATE 50 MG/1
50 TABLET, EXTENDED RELEASE ORAL
Status: DISCONTINUED | OUTPATIENT
Start: 2018-12-06 | End: 2018-12-08

## 2018-12-05 RX ORDER — ENOXAPARIN SODIUM 100 MG/ML
40 INJECTION SUBCUTANEOUS DAILY
Status: DISCONTINUED | OUTPATIENT
Start: 2018-12-05 | End: 2018-12-08

## 2018-12-05 RX ORDER — METOCLOPRAMIDE HYDROCHLORIDE 5 MG/ML
10 INJECTION INTRAMUSCULAR; INTRAVENOUS EVERY 8 HOURS PRN
Status: DISCONTINUED | OUTPATIENT
Start: 2018-12-05 | End: 2018-12-08

## 2018-12-05 RX ORDER — HYDROMORPHONE HYDROCHLORIDE 1 MG/ML
0.5 INJECTION, SOLUTION INTRAMUSCULAR; INTRAVENOUS; SUBCUTANEOUS ONCE
Status: COMPLETED | OUTPATIENT
Start: 2018-12-05 | End: 2018-12-05

## 2018-12-05 RX ORDER — FLUOXETINE HYDROCHLORIDE 20 MG/1
20 CAPSULE ORAL DAILY
Status: DISCONTINUED | OUTPATIENT
Start: 2018-12-06 | End: 2018-12-08

## 2018-12-05 RX ORDER — PANTOPRAZOLE SODIUM 20 MG/1
20 TABLET, DELAYED RELEASE ORAL
Status: DISCONTINUED | OUTPATIENT
Start: 2018-12-06 | End: 2018-12-05

## 2018-12-05 RX ORDER — MIRTAZAPINE 15 MG/1
45 TABLET, FILM COATED ORAL NIGHTLY
Status: DISCONTINUED | OUTPATIENT
Start: 2018-12-05 | End: 2018-12-08

## 2018-12-05 RX ORDER — ACETAMINOPHEN 325 MG/1
650 TABLET ORAL EVERY 6 HOURS PRN
Status: DISCONTINUED | OUTPATIENT
Start: 2018-12-05 | End: 2018-12-08

## 2018-12-05 RX ORDER — HYDROCODONE BITARTRATE AND ACETAMINOPHEN 10; 325 MG/1; MG/1
1 TABLET ORAL 2 TIMES DAILY PRN
Status: DISCONTINUED | OUTPATIENT
Start: 2018-12-05 | End: 2018-12-08

## 2018-12-05 RX ORDER — ONDANSETRON 2 MG/ML
4 INJECTION INTRAMUSCULAR; INTRAVENOUS EVERY 6 HOURS PRN
Status: DISCONTINUED | OUTPATIENT
Start: 2018-12-05 | End: 2018-12-08

## 2018-12-05 RX ORDER — CLONAZEPAM 0.5 MG/1
0.5 TABLET ORAL 2 TIMES DAILY PRN
Status: DISCONTINUED | OUTPATIENT
Start: 2018-12-05 | End: 2018-12-08

## 2018-12-05 RX ORDER — BUSPIRONE HYDROCHLORIDE 5 MG/1
20 TABLET ORAL 3 TIMES DAILY
Status: DISCONTINUED | OUTPATIENT
Start: 2018-12-05 | End: 2018-12-08

## 2018-12-05 RX ORDER — PANTOPRAZOLE SODIUM 40 MG/1
40 TABLET, DELAYED RELEASE ORAL
Status: DISCONTINUED | OUTPATIENT
Start: 2018-12-06 | End: 2018-12-06

## 2018-12-05 RX ORDER — ONDANSETRON 2 MG/ML
4 INJECTION INTRAMUSCULAR; INTRAVENOUS ONCE
Status: DISCONTINUED | OUTPATIENT
Start: 2018-12-05 | End: 2018-12-08

## 2018-12-05 RX ORDER — SODIUM CHLORIDE 9 MG/ML
INJECTION, SOLUTION INTRAVENOUS CONTINUOUS
Status: ACTIVE | OUTPATIENT
Start: 2018-12-05 | End: 2018-12-06

## 2018-12-05 RX ORDER — SODIUM CHLORIDE 9 MG/ML
INJECTION, SOLUTION INTRAVENOUS CONTINUOUS
Status: DISCONTINUED | OUTPATIENT
Start: 2018-12-05 | End: 2018-12-08

## 2018-12-05 RX ORDER — FLUTICASONE PROPIONATE 50 MCG
2 SPRAY, SUSPENSION (ML) NASAL DAILY
Status: DISCONTINUED | OUTPATIENT
Start: 2018-12-05 | End: 2018-12-08

## 2018-12-05 NOTE — ED PROVIDER NOTES
Patient Seen in: THE [facility] Emergency Department In [place]    History   Patient presents with:  Fatigue (constitutional, neurologic)    Stated Complaint: CHRONIC FATIGUE, WEIGHT LOSS - SAME SX AS WHEN HE WAS HERE IN OCT    HPI    27-year-old male present BACK SURGERY  2007    fusion L5-S1   • CATARACT  10/30/16    right eye   • COLONOSCOPY N/A 11/14/2017    Performed by Humble Elizondo MD at [address] [place] [address] ENDOSCOPY   • EGD  11/14/2017   • ESOPHAGOGASTRODUODENOSCOPY (EGD) N/A 11/14/2017    Performed by Margarito Hill or thyromegaly. No hoarseness or stridor. Lungs: Rare end expiratory wheezes but good air exchange. No rales or rhonchi. Heart exam: Normal S1-S2 without extra sounds or murmurs. Regular rate and rhythm.   Abdomen is soft and nontender without masses o Report. Rate: 84  Rhythm: Sinus Rhythm  Reading: Normal EKG. Agree with EKG report. Xr Chest Ap Portable  (cpt=71045)    Result Date: 12/5/2018  PROCEDURE:  XR CHEST AP PORTABLE  (CPT=71045)  TECHNIQUE:  AP chest radiograph was obtained.   CO patient to [facility] for overnight hospitalization. MDM   #1. Left lingular infiltrate. Rocephin and Zithromax given. 2.  Hyperglycemia without evidence of DKA. Serum acetone is negative.   Bicarb is normal.  3.  Frequent stools of uncert

## 2018-12-06 PROCEDURE — 87581 M.PNEUMON DNA AMP PROBE: CPT | Performed by: HOSPITALIST

## 2018-12-06 PROCEDURE — 82962 GLUCOSE BLOOD TEST: CPT

## 2018-12-06 PROCEDURE — 85025 COMPLETE CBC W/AUTO DIFF WBC: CPT | Performed by: INTERNAL MEDICINE

## 2018-12-06 PROCEDURE — 87046 STOOL CULTR AEROBIC BACT EA: CPT | Performed by: EMERGENCY MEDICINE

## 2018-12-06 PROCEDURE — 80048 BASIC METABOLIC PNL TOTAL CA: CPT | Performed by: INTERNAL MEDICINE

## 2018-12-06 PROCEDURE — 87633 RESP VIRUS 12-25 TARGETS: CPT | Performed by: HOSPITALIST

## 2018-12-06 PROCEDURE — 87427 SHIGA-LIKE TOXIN AG IA: CPT | Performed by: EMERGENCY MEDICINE

## 2018-12-06 PROCEDURE — 87798 DETECT AGENT NOS DNA AMP: CPT | Performed by: HOSPITALIST

## 2018-12-06 PROCEDURE — 87493 C DIFF AMPLIFIED PROBE: CPT | Performed by: EMERGENCY MEDICINE

## 2018-12-06 PROCEDURE — 87045 FECES CULTURE AEROBIC BACT: CPT | Performed by: EMERGENCY MEDICINE

## 2018-12-06 PROCEDURE — 87486 CHLMYD PNEUM DNA AMP PROBE: CPT | Performed by: HOSPITALIST

## 2018-12-06 PROCEDURE — 84443 ASSAY THYROID STIM HORMONE: CPT | Performed by: HOSPITALIST

## 2018-12-06 PROCEDURE — 84439 ASSAY OF FREE THYROXINE: CPT | Performed by: HOSPITALIST

## 2018-12-06 PROCEDURE — 87999 UNLISTED MICROBIOLOGY PX: CPT

## 2018-12-06 PROCEDURE — 82272 OCCULT BLD FECES 1-3 TESTS: CPT | Performed by: EMERGENCY MEDICINE

## 2018-12-06 RX ORDER — FAMOTIDINE 20 MG/1
40 TABLET ORAL 2 TIMES DAILY
Status: DISCONTINUED | OUTPATIENT
Start: 2018-12-06 | End: 2018-12-06

## 2018-12-06 RX ORDER — NICOTINE 21 MG/24HR
1 PATCH, TRANSDERMAL 24 HOURS TRANSDERMAL DAILY
Status: DISCONTINUED | OUTPATIENT
Start: 2018-12-06 | End: 2018-12-08

## 2018-12-06 RX ORDER — MAGNESIUM CARB/ALUMINUM HYDROX 105-160MG
296 TABLET,CHEWABLE ORAL ONCE
Status: COMPLETED | OUTPATIENT
Start: 2018-12-06 | End: 2018-12-06

## 2018-12-06 RX ORDER — DEXTROSE MONOHYDRATE 25 G/50ML
50 INJECTION, SOLUTION INTRAVENOUS
Status: DISCONTINUED | OUTPATIENT
Start: 2018-12-06 | End: 2018-12-08

## 2018-12-06 RX ORDER — PANTOPRAZOLE SODIUM 40 MG/1
40 TABLET, DELAYED RELEASE ORAL
Status: DISCONTINUED | OUTPATIENT
Start: 2018-12-07 | End: 2018-12-08

## 2018-12-06 NOTE — H&P
[facility] Hospitalist H&P       CC: generalized fatigue    PCP: Emilia Mcgarry MD    History of Present Illness: Pt is a 48 yo with HTN/HL, DMI on insulin pump with associated neuropathy, chronic pain with intrathecal pump, chronic pancreatitis, KLEVER Performed by Catrina Hahn MD at [facility] Right 05/04/2018    Morphine 5.0 mg/cc R abdomen implant   • OTHER  2016    CERVICAL SURGERY   • OTHER SURGICAL HISTORY  10/12    left knee scoped   • SPINE SURGERY PROCEDURE Ole Cheatham nightly. Disp:  Rfl: 1   ClonazePAM 0.5 MG Oral Tab Take 0.5 mg by mouth 2 (two) times daily as needed for Anxiety.  Disp:  Rfl:          Soc Hx  Social History    Tobacco Use      Smoking status: Current Every Day Smoker        Packs/day: 0.50        Yea 6.1  4.5   HGB  12.6*  11.3*   MCV  88.5  90.5   PLT  220.0  162.0       Recent Labs   Lab  12/05/18   1635  12/06/18   0628   NA  138  141   K  3.6  4.2   CL  101  107   CO2  31.0  25.0   BUN  18  12   CREATSERUM  1.10  0.76   GLU  208*  273*   CA  9.1  7 have KLEVER so could be contributing  -related to his PTSD?  On adderal, psych consult, appreciate  -TFTs ordered  -UDS ordered    **chronic diarrhea in setting of incidental finding of focal thickening at sigmoid colon on CT a/p (also known chronic pancreatit

## 2018-12-06 NOTE — CONSULTS
[address] [facility] Patient Status:  Observation   Date of Birth 11/16/1965 MRN [id number]   [facility] 5NW-A Attending Brandon Gipson, *   Hosp Day # 0 PCP Clearance Scheuermann, MD     Ravi Date 12/14/18, Taking? Yes, Authorizing Provider Radha Fields MD    Medication Metoprolol Succinate ER 50 MG Oral Tablet 24 Hr, Sig Take 1 tablet (50 mg total) by mouth daily. , Start Date 10/24/18, End Date , Taking?  Yes, Authorizing Provider VANIB Taking? Yes, Authorizing Provider Rahat Jaimes MD    Medication Insulin Pump Accessories (PUMP REMOTE ) Does not apply Misc, Sig , Start Date , End Date , Taking?  Yes, Authorizing Provider External/Patient, Reported    Medication mirtazapine Oral Q15 Min PRN   Or      Glucose-Vitamin C (DEX-4) 4-6 GM-MG chewable tab 8 tablet 8 tablet Oral Q15 Min PRN   insulin detemir (LEVEMIR) 100 UNIT/ML flextouch 15 Units 15 Units Subcutaneous Daily   Insulin Aspart Pen (NOVOLOG) 100 UNIT/ML flexpen 1-68 Un status: Current Every Day Smoker        Packs/day: 0.50        Years: 5.00        Pack years: 2.5        Types: Cigarettes        Start date: 9/9/2012      Smokeless tobacco: Never Used    Alcohol use: No      Alcohol/week: 0.0 oz      Comment: no alcohol techniques were used. Dose information is   transmitted to the ACR [facility] [employer] of Radiology) NRDR ([address]) which includes the Dose Index Registry.      PATIENT STATED HISTORY:(As transcribed by Technologist)  Patient complain consolidation. This is not visualized on chest radiograph.      2.  Focal thickening at the junction of the descending and sigmoid colon is new since prior exam.  Though this can be seen with peristalsis, possibility of malignancy developing in this region

## 2018-12-06 NOTE — PLAN OF CARE
Diabetes/Glucose Control    • Glucose maintained within prescribed range Progressing        Patient/Family Goals    • Patient/Family Long Term Goal Progressing    • Patient/Family Short Term Goal Progressing          AOx4, here for generalized weakness and

## 2018-12-06 NOTE — ED NOTES
Pt states he does not feel better and he wants norco.  Pt keeps eyes closed when talking with staff.

## 2018-12-06 NOTE — PLAN OF CARE
Diabetes/Glucose Control    • Glucose maintained within prescribed range Progressing        Patient/Family Goals    • Patient/Family Long Term Goal Progressing    • Patient/Family Short Term Goal Progressing          Patient alert and oriented.  Maintaining

## 2018-12-06 NOTE — CONSULTS
[address]    PATIENT'S NAME: Eulogio Arroyo   ATTENDING PHYSICIAN: Aracelis Zepeda. Ken Andersen MD   CONSULTING PHYSICIAN: Mariely Quesada M.D.    PATIENT ACCOUNT#:   [de-identified]    LOCATION:  [address] [place] [address]  MEDICAL RECORD #:   [id number]       DATE OF BIRTH atraumatic. NECK:  Supple. LUNGS:  Clear bilaterally. HEART:  Regular rate and rhythm. ABDOMEN:  Soft, nontender. EXTREMITIES:  No cyanosis, clubbing, or edema. NEUROLOGIC:  Awake, moving all 4 extremities equally. SKIN:  Warm and dry.   PSYCHIATRIC:

## 2018-12-06 NOTE — PLAN OF CARE
NURSING ADMISSION NOTE      Patient admitted via Cart  Oriented to room. Safety precautions initiated. Bed in low position. Call light in reach. Admission navigator complete.    Patient has pain pump, states it has morphine but cannot recall dosag

## 2018-12-07 ENCOUNTER — APPOINTMENT (OUTPATIENT)
Dept: GENERAL RADIOLOGY | Facility: HOSPITAL | Age: 53
End: 2018-12-07
Attending: INTERNAL MEDICINE
Payer: MEDICAID

## 2018-12-07 PROCEDURE — 85025 COMPLETE CBC W/AUTO DIFF WBC: CPT | Performed by: HOSPITALIST

## 2018-12-07 PROCEDURE — 74019 RADEX ABDOMEN 2 VIEWS: CPT | Performed by: INTERNAL MEDICINE

## 2018-12-07 PROCEDURE — 80048 BASIC METABOLIC PNL TOTAL CA: CPT | Performed by: HOSPITALIST

## 2018-12-07 PROCEDURE — 97161 PT EVAL LOW COMPLEX 20 MIN: CPT

## 2018-12-07 PROCEDURE — 83735 ASSAY OF MAGNESIUM: CPT | Performed by: HOSPITALIST

## 2018-12-07 PROCEDURE — 82962 GLUCOSE BLOOD TEST: CPT

## 2018-12-07 PROCEDURE — 97116 GAIT TRAINING THERAPY: CPT

## 2018-12-07 NOTE — BH PROGRESS NOTE
Went to see the pt for his history of ptsd/fatigue. Pt stating he does not need an assessment done due to him already having a psychiatrist.  He has been seeing a psychiatrist at [facility] in [place] for the past 4 years.   He said, he sees the psychiatrist every

## 2018-12-07 NOTE — PLAN OF CARE
Diabetes/Glucose Control    • Glucose maintained within prescribed range Progressing        Patient/Family Goals    • Patient/Family Long Term Goal Progressing    • Patient/Family Short Term Goal Progressing          AOx4, here for weakness/fatigue.  NSR on

## 2018-12-07 NOTE — PHYSICAL THERAPY NOTE
PHYSICAL THERAPY QUICK EVALUATION - INPATIENT    Room Number: 522/522-A  Evaluation Date: 12/7/2018  Presenting Problem: pneumonia  Physician Order: PT Eval and Treat    Problem List  Principal Problem:    Community acquired pneumonia, unspecified Renee Andino Moises       HOME SITUATION  Type of Home: House   Home Layout: One level  Stairs to Enter : 1             Lives With: Alone             Prior Level of Heard: Ind PTA;  Pt reports currently is not working. Pt works on projects around the home.   Pt ind.  Pt completed toileting ind. Ambulation in hallway without AD without LOB. Pt able to demo normal tiki with head turns during conversation. Pt encouraged to ambulate multiple times per day to prevent deconditioning. Pt verbalizes understanding.

## 2018-12-07 NOTE — PROGRESS NOTES
[facility]  Progress Note    David Rice Patient Status:  Observation    1965 MRN [id number]   [facility] 5NW-A Attending Leticia Bates, *   Hosp Day # 0 PCP Julio Rios MD     Assessment/Plan:  Patient Act RAE.  At discharge will have him see educator to see what his options are.    2.  Possible pneumonia. Currently on antibiotics. Per Primary service.         Subjective:  No complaints      Objective/Physical Exam:  Physical Exam:     General: Alert.

## 2018-12-07 NOTE — PLAN OF CARE
Diabetes/Glucose Control    • Glucose maintained within prescribed range Progressing        Impaired Functional Mobility    • Achieve highest/safest level of mobility/gait Progressing        Patient/Family Goals    • Patient/Family Long Term Goal Progressi FOR READMISSION

## 2018-12-07 NOTE — PROGRESS NOTES
12/06/18 2150   Provider Notification   Reason for Communication Critical value  (/86)   Provider Name [facility] NURSING [facility])   Method of Communication Page   Response Phone call   Notification Time 2154     /86, Dr. Noemy Fox wants recheck of BP to confirm this

## 2018-12-07 NOTE — PROGRESS NOTES
[facility] Hospitalist Progress Note     PCP: Abigail Doe MD    CC:  Follow up    SUBJECTIVE:  PT sitting up in bed, RN at bedside. Feels BMs have decreased in frequency. Otherwise feels about the same.  No f/c/cp/sob    OBJECTIVE:  Temp:  [97.4 °F (3 Subcutaneous TID CC   • Insulin Aspart Pen  1-10 Units Subcutaneous TID AC and HS   • pancrelipase (Lip-Prot-Amyl)  20,000 Units Oral TID CC   • Pantoprazole Sodium  40 mg Oral QAM AC   • nicotine  1 patch Transdermal Daily   • enoxaparin  40 mg Subcutaneo but no obstruction    **cough and chest congestion  -CXR unremarkable, no left shift, afebrile. PCT negative  -suspect viral in nature  -RVP negative.  Clear lung exam. procalcitonin negative  -supportive care     **incidental finding of GGO in lingula on C

## 2018-12-08 VITALS
RESPIRATION RATE: 18 BRPM | OXYGEN SATURATION: 97 % | DIASTOLIC BLOOD PRESSURE: 81 MMHG | SYSTOLIC BLOOD PRESSURE: 132 MMHG | TEMPERATURE: 98 F | HEART RATE: 63 BPM | HEIGHT: 74 IN | BODY MASS INDEX: 23.55 KG/M2 | WEIGHT: 183.5 LBS

## 2018-12-08 PROCEDURE — 84132 ASSAY OF SERUM POTASSIUM: CPT | Performed by: HOSPITALIST

## 2018-12-08 PROCEDURE — 83735 ASSAY OF MAGNESIUM: CPT | Performed by: HOSPITALIST

## 2018-12-08 PROCEDURE — 94664 DEMO&/EVAL PT USE INHALER: CPT

## 2018-12-08 PROCEDURE — 82962 GLUCOSE BLOOD TEST: CPT

## 2018-12-08 NOTE — PROGRESS NOTES
[facility]  Progress Note    Simona Bailon Dwayne Patient Status:  Observation    1965 MRN [id number]   [facility] 5NW-A Attending David Oconnor, *   Hosp Day # 0 PCP Mya Cornejo MD     Assessment/Plan:  Patient Act in CGM.   At discharge will have him see educator to see what his options are.     2.  Possible pneumonia.    Currently on antibiotics.    Per Primary service.           Subjective:  No complaints, ready to go home      Objective/Physical Exam:  Physical Ex 143 (H) 224 (H)     Colin Gibson MD, [facility]   Endocrinology, Diabetes, and Metabolism  [facility]

## 2018-12-08 NOTE — PLAN OF CARE
Diabetes/Glucose Control    • Glucose maintained within prescribed range Adequate for Discharge        Impaired Functional Mobility    • Achieve highest/safest level of mobility/gait Adequate for Discharge        Patient/Family Goals    • Patient/Family Lo

## 2018-12-08 NOTE — DISCHARGE SUMMARY
General Medicine Discharge Summary     Patient ID:  Leola Castleman  47year old  [id number]  11/16/1965    Admit date: 12/5/2018    Discharge date and time:  12/8/18    Attending Physician: Alvin Mann, *     Primary Care Physician: Anabela Landaverde psych consult, appreciate  -TFTs with subclinical hyperthyroidism- repeat TFTs in 4-6 weeks with PCP  -UDS not collected     **chronic diarrhea in setting of incidental finding of focal thickening at sigmoid colon on CT a/p (also known chronic pancreatitis Patient was seen in October for chronic fatigue and weight loss. Over the past 10 days, he has increasing fatigue, nausea and hyperglycemia. FINDINGS: Cardiac silhouette and pulmonary vasculature are unremarkable.  No consolidation, pleural effusion or p dilatation of the common bile duct. SPLEEN:  No enlargement or focal lesion. KIDNEYS:  No mass, obstruction, or calcification. ADRENALS:  No mass or enlargement. AORTA/VASCULAR:  No aneurysm or dissection. RETROPERITONEUM:  No mass or adenopathy.   ROGERS transcribed by Technologist)  Patient offered no additional history at this time. FINDINGS:  No consolidation at the lung bases. The stomach is nondistended. No obstructive bowel gas pattern. Air is seen to the rectum.   There is only a small amount o not apply Misc      mirtazapine 45 MG Oral Tab  Take 45 mg by mouth nightly. ClonazePAM 0.5 MG Oral Tab  Take 0.5 mg by mouth 2 (two) times daily as needed for Anxiety.     !! Glucose Blood (CONTOUR NEXT TEST) In Vitro Strip  Test 10 times daily    ins

## 2018-12-08 NOTE — PROGRESS NOTES
[facility]  GI Progress Note      Dane Iram Patient Status:  Observation    1965 MRN [id number]   [facility] 5NW-A Attending Ryan Barber, *   Hosp Day # 0 PCP Uma Cho MD          SUBJECTIVE:     The bowel gas pattern. Continued clinical correlation recommended. Dictated by: Shannan Sabillon MD on 12/07/2018 at 9:27       Approved by: Shannan Sabillon MD      ASSESSMENT:    1. Diarrhea  2.  Chronic pancreatitis    He is much better after magnesium

## 2018-12-12 PROBLEM — E16.2 HYPOGLYCEMIA: Status: ACTIVE | Noted: 2018-12-12

## 2018-12-26 RX ORDER — METOPROLOL SUCCINATE 50 MG/1
TABLET, EXTENDED RELEASE ORAL
Qty: 30 TABLET | Refills: 0 | OUTPATIENT
Start: 2018-12-26

## 2019-03-15 PROBLEM — E29.1 HYPOGONADISM IN MALE: Status: ACTIVE | Noted: 2019-03-15

## 2019-03-22 ENCOUNTER — HOSPITAL ENCOUNTER (INPATIENT)
Facility: HOSPITAL | Age: 54
LOS: 2 days | Discharge: HOME | DRG: 638 | End: 2019-03-24
Attending: EMERGENCY MEDICINE | Admitting: INTERNAL MEDICINE
Payer: MEDICAID

## 2019-03-22 DIAGNOSIS — E10.10 TYPE 1 DIABETES MELLITUS WITH KETOACIDOSIS WITHOUT COMA (HCC): Primary | ICD-10-CM

## 2019-03-22 PROBLEM — N17.9 ACUTE KIDNEY INJURY (HCC): Status: ACTIVE | Noted: 2019-03-22

## 2019-03-22 LAB
ALBUMIN SERPL-MCNC: 4 G/DL (ref 3.4–5)
ALBUMIN/GLOB SERPL: 1.1 {RATIO} (ref 1–2)
ALP LIVER SERPL-CCNC: 85 U/L (ref 45–117)
ALT SERPL-CCNC: 36 U/L (ref 16–61)
ANION GAP SERPL CALC-SCNC: 20 MMOL/L (ref 0–18)
AST SERPL-CCNC: 21 U/L (ref 15–37)
BASOPHILS # BLD AUTO: 0.11 X10(3) UL (ref 0–0.2)
BASOPHILS NFR BLD AUTO: 1.3 %
BILIRUB SERPL-MCNC: 0.8 MG/DL (ref 0.1–2)
BUN BLD-MCNC: 28 MG/DL (ref 7–18)
BUN/CREAT SERPL: 21.7 (ref 10–20)
CALCIUM BLD-MCNC: 8.6 MG/DL (ref 8.5–10.1)
CHLORIDE SERPL-SCNC: 90 MMOL/L (ref 98–107)
CLARITY UR REFRACT.AUTO: CLEAR
CO2 SERPL-SCNC: 17 MMOL/L (ref 21–32)
CREAT BLD-MCNC: 1.29 MG/DL (ref 0.7–1.3)
DEPRECATED RDW RBC AUTO: 42.5 FL (ref 35.1–46.3)
EOSINOPHIL # BLD AUTO: 0.02 X10(3) UL (ref 0–0.7)
EOSINOPHIL NFR BLD AUTO: 0.2 %
ERYTHROCYTE [DISTWIDTH] IN BLOOD BY AUTOMATED COUNT: 12.4 % (ref 11–15)
GLOBULIN PLAS-MCNC: 3.8 G/DL (ref 2.8–4.4)
GLUCOSE BLD-MCNC: 178 MG/DL (ref 70–99)
GLUCOSE BLD-MCNC: 205 MG/DL (ref 70–99)
GLUCOSE BLD-MCNC: 318 MG/DL (ref 70–99)
GLUCOSE BLD-MCNC: 404 MG/DL (ref 70–99)
GLUCOSE BLD-MCNC: 413 MG/DL (ref 70–99)
GLUCOSE UR STRIP.AUTO-MCNC: 500 MG/DL
HCT VFR BLD AUTO: 49.6 % (ref 39–53)
HGB BLD-MCNC: 16.6 G/DL (ref 13–17.5)
IMM GRANULOCYTES # BLD AUTO: 0.03 X10(3) UL (ref 0–1)
IMM GRANULOCYTES NFR BLD: 0.4 %
KETONES UR STRIP.AUTO-MCNC: 80 MG/DL
LEUKOCYTE ESTERASE UR QL STRIP.AUTO: NEGATIVE
LYMPHOCYTES # BLD AUTO: 1.26 X10(3) UL (ref 1–4)
LYMPHOCYTES NFR BLD AUTO: 15.2 %
M PROTEIN MFR SERPL ELPH: 7.8 G/DL (ref 6.4–8.2)
MCH RBC QN AUTO: 31.1 PG (ref 26–34)
MCHC RBC AUTO-ENTMCNC: 33.5 G/DL (ref 31–37)
MCV RBC AUTO: 93.1 FL (ref 80–100)
MONOCYTES # BLD AUTO: 0.45 X10(3) UL (ref 0.1–1)
MONOCYTES NFR BLD AUTO: 5.4 %
NEUTROPHILS # BLD AUTO: 6.43 X10 (3) UL (ref 1.5–7.7)
NEUTROPHILS # BLD AUTO: 6.43 X10(3) UL (ref 1.5–7.7)
NEUTROPHILS NFR BLD AUTO: 77.5 %
NITRITE UR QL STRIP.AUTO: NEGATIVE
OSMOLALITY SERPL CALC.SUM OF ELEC: 287 MOSM/KG (ref 275–295)
PH UR STRIP.AUTO: 5 [PH] (ref 4.5–8)
PLATELET # BLD AUTO: 236 10(3)UL (ref 150–450)
POTASSIUM SERPL-SCNC: 4.6 MMOL/L (ref 3.5–5.1)
PROT UR STRIP.AUTO-MCNC: NEGATIVE MG/DL
RBC # BLD AUTO: 5.33 X10(6)UL (ref 4.3–5.7)
RBC UR QL AUTO: NEGATIVE
SODIUM SERPL-SCNC: 127 MMOL/L (ref 136–145)
SP GR UR STRIP.AUTO: 1.02 (ref 1–1.03)
TROPONIN I SERPL-MCNC: <0.045 NG/ML (ref ?–0.04)
UROBILINOGEN UR STRIP.AUTO-MCNC: 0.2 MG/DL
WBC # BLD AUTO: 8.3 X10(3) UL (ref 4–11)

## 2019-03-22 PROCEDURE — 99291 CRITICAL CARE FIRST HOUR: CPT | Performed by: NURSE PRACTITIONER

## 2019-03-22 RX ORDER — ONDANSETRON 2 MG/ML
4 INJECTION INTRAMUSCULAR; INTRAVENOUS EVERY 6 HOURS PRN
Status: DISCONTINUED | OUTPATIENT
Start: 2019-03-22 | End: 2019-03-23

## 2019-03-22 RX ORDER — DEXTROSE MONOHYDRATE 25 G/50ML
50 INJECTION, SOLUTION INTRAVENOUS
Status: DISCONTINUED | OUTPATIENT
Start: 2019-03-22 | End: 2019-03-24

## 2019-03-22 RX ORDER — DEXTROSE AND SODIUM CHLORIDE 5; .45 G/100ML; G/100ML
100 INJECTION, SOLUTION INTRAVENOUS CONTINUOUS PRN
Status: DISCONTINUED | OUTPATIENT
Start: 2019-03-22 | End: 2019-03-24

## 2019-03-22 RX ORDER — SODIUM CHLORIDE 9 MG/ML
INJECTION, SOLUTION INTRAVENOUS CONTINUOUS
Status: DISCONTINUED | OUTPATIENT
Start: 2019-03-22 | End: 2019-03-22

## 2019-03-22 RX ORDER — SODIUM CHLORIDE 9 MG/ML
INJECTION, SOLUTION INTRAVENOUS ONCE
Status: DISCONTINUED | OUTPATIENT
Start: 2019-03-22 | End: 2019-03-22

## 2019-03-22 RX ORDER — DEXTROSE MONOHYDRATE 25 G/50ML
50 INJECTION, SOLUTION INTRAVENOUS
Status: DISCONTINUED | OUTPATIENT
Start: 2019-03-22 | End: 2019-03-22

## 2019-03-22 RX ORDER — ACETAMINOPHEN 325 MG/1
650 TABLET ORAL EVERY 6 HOURS PRN
Status: DISCONTINUED | OUTPATIENT
Start: 2019-03-22 | End: 2019-03-24

## 2019-03-22 RX ORDER — SODIUM CHLORIDE 9 MG/ML
INJECTION, SOLUTION INTRAVENOUS CONTINUOUS
Status: DISCONTINUED | OUTPATIENT
Start: 2019-03-22 | End: 2019-03-23

## 2019-03-22 RX ORDER — METOCLOPRAMIDE HYDROCHLORIDE 5 MG/ML
10 INJECTION INTRAMUSCULAR; INTRAVENOUS EVERY 8 HOURS PRN
Status: DISCONTINUED | OUTPATIENT
Start: 2019-03-22 | End: 2019-03-24

## 2019-03-22 RX ORDER — HEPARIN SODIUM 5000 [USP'U]/ML
5000 INJECTION, SOLUTION INTRAVENOUS; SUBCUTANEOUS EVERY 12 HOURS SCHEDULED
Status: DISCONTINUED | OUTPATIENT
Start: 2019-03-22 | End: 2019-03-24

## 2019-03-23 ENCOUNTER — APPOINTMENT (OUTPATIENT)
Dept: CV DIAGNOSTICS | Facility: HOSPITAL | Age: 54
DRG: 638 | End: 2019-03-23
Attending: INTERNAL MEDICINE
Payer: MEDICAID

## 2019-03-23 ENCOUNTER — APPOINTMENT (OUTPATIENT)
Dept: GENERAL RADIOLOGY | Facility: HOSPITAL | Age: 54
DRG: 638 | End: 2019-03-23
Attending: INTERNAL MEDICINE
Payer: MEDICAID

## 2019-03-23 ENCOUNTER — APPOINTMENT (OUTPATIENT)
Dept: GENERAL RADIOLOGY | Facility: HOSPITAL | Age: 54
DRG: 638 | End: 2019-03-23
Attending: HOSPITALIST
Payer: MEDICAID

## 2019-03-23 LAB
ADENOVIRUS PCR:: NEGATIVE
ANION GAP SERPL CALC-SCNC: 12 MMOL/L (ref 0–18)
ANION GAP SERPL CALC-SCNC: 6 MMOL/L (ref 0–18)
ATRIAL RATE: 110 BPM
ATRIAL RATE: 88 BPM
B PERT DNA SPEC QL NAA+PROBE: NEGATIVE
BASOPHILS # BLD AUTO: 0.09 X10(3) UL (ref 0–0.2)
BASOPHILS NFR BLD AUTO: 1.1 %
BUN BLD-MCNC: 21 MG/DL (ref 7–18)
BUN BLD-MCNC: 24 MG/DL (ref 7–18)
BUN/CREAT SERPL: 19.3 (ref 10–20)
BUN/CREAT SERPL: 23.3 (ref 10–20)
C PNEUM DNA SPEC QL NAA+PROBE: NEGATIVE
CALCIUM BLD-MCNC: 7.9 MG/DL (ref 8.5–10.1)
CALCIUM BLD-MCNC: 8 MG/DL (ref 8.5–10.1)
CHLORIDE SERPL-SCNC: 100 MMOL/L (ref 98–107)
CHLORIDE SERPL-SCNC: 101 MMOL/L (ref 98–107)
CHOLEST SMN-MCNC: 142 MG/DL (ref ?–200)
CO2 SERPL-SCNC: 22 MMOL/L (ref 21–32)
CO2 SERPL-SCNC: 27 MMOL/L (ref 21–32)
CORONAVIRUS 229E PCR:: NEGATIVE
CORONAVIRUS HKU1 PCR:: NEGATIVE
CORONAVIRUS NL63 PCR:: NEGATIVE
CORONAVIRUS OC43 PCR:: NEGATIVE
CREAT BLD-MCNC: 1.03 MG/DL (ref 0.7–1.3)
CREAT BLD-MCNC: 1.09 MG/DL (ref 0.7–1.3)
CRP SERPL-MCNC: 0.32 MG/DL (ref ?–0.3)
DEPRECATED RDW RBC AUTO: 40.5 FL (ref 35.1–46.3)
EOSINOPHIL # BLD AUTO: 0.12 X10(3) UL (ref 0–0.7)
EOSINOPHIL NFR BLD AUTO: 1.4 %
ERYTHROCYTE [DISTWIDTH] IN BLOOD BY AUTOMATED COUNT: 12 % (ref 11–15)
EST. AVERAGE GLUCOSE BLD GHB EST-MCNC: 243 MG/DL (ref 68–126)
FLUAV RNA SPEC QL NAA+PROBE: NEGATIVE
FLUBV RNA SPEC QL NAA+PROBE: NEGATIVE
GLUCOSE BLD-MCNC: 130 MG/DL (ref 70–99)
GLUCOSE BLD-MCNC: 139 MG/DL (ref 70–99)
GLUCOSE BLD-MCNC: 163 MG/DL (ref 70–99)
GLUCOSE BLD-MCNC: 163 MG/DL (ref 70–99)
GLUCOSE BLD-MCNC: 169 MG/DL (ref 70–99)
GLUCOSE BLD-MCNC: 170 MG/DL (ref 70–99)
GLUCOSE BLD-MCNC: 171 MG/DL (ref 70–99)
GLUCOSE BLD-MCNC: 171 MG/DL (ref 70–99)
GLUCOSE BLD-MCNC: 172 MG/DL (ref 70–99)
GLUCOSE BLD-MCNC: 177 MG/DL (ref 70–99)
GLUCOSE BLD-MCNC: 182 MG/DL (ref 70–99)
GLUCOSE BLD-MCNC: 187 MG/DL (ref 70–99)
GLUCOSE BLD-MCNC: 199 MG/DL (ref 70–99)
GLUCOSE BLD-MCNC: 205 MG/DL (ref 70–99)
GLUCOSE BLD-MCNC: 96 MG/DL (ref 70–99)
HAV IGM SER QL: 1.9 MG/DL (ref 1.6–2.6)
HAV IGM SER QL: 2 MG/DL (ref 1.6–2.6)
HBA1C MFR BLD HPLC: 10.1 % (ref ?–5.7)
HCT VFR BLD AUTO: 44.7 % (ref 39–53)
HDLC SERPL-MCNC: 32 MG/DL (ref 40–59)
HGB BLD-MCNC: 14.9 G/DL (ref 13–17.5)
IMM GRANULOCYTES # BLD AUTO: 0.03 X10(3) UL (ref 0–1)
IMM GRANULOCYTES NFR BLD: 0.4 %
LACTATE SERPL-SCNC: 0.9 MMOL/L (ref 0.4–2)
LDLC SERPL CALC-MCNC: 88 MG/DL (ref ?–100)
LIPASE SERPL-CCNC: 29 U/L (ref 73–393)
LYMPHOCYTES # BLD AUTO: 2.43 X10(3) UL (ref 1–4)
LYMPHOCYTES NFR BLD AUTO: 28.9 %
MCH RBC QN AUTO: 30.5 PG (ref 26–34)
MCHC RBC AUTO-ENTMCNC: 33.3 G/DL (ref 31–37)
MCV RBC AUTO: 91.4 FL (ref 80–100)
METAPNEUMOVIRUS PCR:: NEGATIVE
MONOCYTES # BLD AUTO: 1.15 X10(3) UL (ref 0.1–1)
MONOCYTES NFR BLD AUTO: 13.7 %
MYCOPLASMA PNEUMONIA PCR:: NEGATIVE
NEUTROPHILS # BLD AUTO: 4.6 X10 (3) UL (ref 1.5–7.7)
NEUTROPHILS # BLD AUTO: 4.6 X10(3) UL (ref 1.5–7.7)
NEUTROPHILS NFR BLD AUTO: 54.5 %
NONHDLC SERPL-MCNC: 110 MG/DL (ref ?–130)
OSMOLALITY SERPL CALC.SUM OF ELEC: 283 MOSM/KG (ref 275–295)
OSMOLALITY SERPL CALC.SUM OF ELEC: 289 MOSM/KG (ref 275–295)
P AXIS: 54 DEGREES
P AXIS: 60 DEGREES
P-R INTERVAL: 140 MS
P-R INTERVAL: 142 MS
PARAINFLUENZA 1 PCR:: NEGATIVE
PARAINFLUENZA 2 PCR:: NEGATIVE
PARAINFLUENZA 3 PCR:: NEGATIVE
PARAINFLUENZA 4 PCR:: NEGATIVE
PHOSPHATE SERPL-MCNC: 2.8 MG/DL (ref 2.5–4.9)
PHOSPHATE SERPL-MCNC: 3 MG/DL (ref 2.5–4.9)
PLATELET # BLD AUTO: 212 10(3)UL (ref 150–450)
POTASSIUM SERPL-SCNC: 3.9 MMOL/L (ref 3.5–5.1)
POTASSIUM SERPL-SCNC: 4.2 MMOL/L (ref 3.5–5.1)
Q-T INTERVAL: 318 MS
Q-T INTERVAL: 332 MS
QRS DURATION: 82 MS
QRS DURATION: 88 MS
QTC CALCULATION (BEZET): 401 MS
QTC CALCULATION (BEZET): 430 MS
R AXIS: 73 DEGREES
R AXIS: 75 DEGREES
RBC # BLD AUTO: 4.89 X10(6)UL (ref 4.3–5.7)
RHINOVIRUS/ENTERO PCR:: NEGATIVE
RSV RNA SPEC QL NAA+PROBE: NEGATIVE
SED RATE-ML: 7 MM/HR (ref 0–12)
SODIUM SERPL-SCNC: 133 MMOL/L (ref 136–145)
SODIUM SERPL-SCNC: 135 MMOL/L (ref 136–145)
T AXIS: 46 DEGREES
T AXIS: 54 DEGREES
TRIGL SERPL-MCNC: 112 MG/DL (ref 30–149)
VENTRICULAR RATE: 110 BPM
VENTRICULAR RATE: 88 BPM
VLDLC SERPL CALC-MCNC: 22 MG/DL (ref 0–30)
WBC # BLD AUTO: 8.4 X10(3) UL (ref 4–11)

## 2019-03-23 PROCEDURE — 93306 TTE W/DOPPLER COMPLETE: CPT | Performed by: INTERNAL MEDICINE

## 2019-03-23 PROCEDURE — 74018 RADEX ABDOMEN 1 VIEW: CPT | Performed by: HOSPITALIST

## 2019-03-23 PROCEDURE — 71045 X-RAY EXAM CHEST 1 VIEW: CPT | Performed by: INTERNAL MEDICINE

## 2019-03-23 RX ORDER — ONDANSETRON 2 MG/ML
4 INJECTION INTRAMUSCULAR; INTRAVENOUS EVERY 6 HOURS PRN
Status: DISCONTINUED | OUTPATIENT
Start: 2019-03-23 | End: 2019-03-24

## 2019-03-23 RX ORDER — CLONAZEPAM 0.5 MG/1
0.5 TABLET ORAL 2 TIMES DAILY PRN
Status: DISCONTINUED | OUTPATIENT
Start: 2019-03-23 | End: 2019-03-24

## 2019-03-23 RX ORDER — FLUTICASONE PROPIONATE 50 MCG
2 SPRAY, SUSPENSION (ML) NASAL DAILY
Status: DISCONTINUED | OUTPATIENT
Start: 2019-03-23 | End: 2019-03-24

## 2019-03-23 RX ORDER — PANTOPRAZOLE SODIUM 40 MG/1
40 TABLET, DELAYED RELEASE ORAL
Status: DISCONTINUED | OUTPATIENT
Start: 2019-03-23 | End: 2019-03-24

## 2019-03-23 RX ORDER — DEXTROAMPHETAMINE SACCHARATE, AMPHETAMINE ASPARTATE, DEXTROAMPHETAMINE SULFATE AND AMPHETAMINE SULFATE 1.25; 1.25; 1.25; 1.25 MG/1; MG/1; MG/1; MG/1
15 TABLET ORAL 2 TIMES DAILY
Status: DISCONTINUED | OUTPATIENT
Start: 2019-03-23 | End: 2019-03-24

## 2019-03-23 NOTE — PROGRESS NOTES
Cardiology consult placed for evaluation of continued diffuse ST changes on EKG this morning and concern for possible pericarditis despite negative complaints and normal troponin in ED. Patient did have temporal fever to 100.4 degrees F overnight.   On com

## 2019-03-23 NOTE — ED PROVIDER NOTES
Patient Seen in: [facility] [place]    History   Patient presents with:  Dehydration (metabolic/constitutional)    Stated Complaint: \" i think im in diabetic ketoacidosis\" sleeping for 2 days    HPI    It is a 52-year-old male wh CATARACT WITH INTRAOCULAR LENS Left 3/6/2017    Performed by Gloria Mesa MD at [facility] [facility] Right 05/04/2018    Morphine 5.0 mg/cc R abdomen implant   • OTHER  2016    CERVICAL SURGERY   • OTHER SURGICAL HISTORY  10/12    left (14) - Abnormal; Notable for the following components:       Result Value    Glucose 413 (*)     Sodium 127 (*)     Chloride 90 (*)     CO2 17.0 (*)     Anion Gap 20 (*)     BUN 28 (*)     BUN/CREA Ratio 21.7 (*)     All other components within normal limi infection. Troponins negative. MDM   DKA. Insulin drip. Admitted to the ICU. Case was discussed with [facility] hospitalist, [facility] pulmonology, [facility] endocrinology.   Admission disposition: 3/22/2019  8:22 PM                 Disposition and Plan     Clinical

## 2019-03-23 NOTE — PROGRESS NOTES
ICU  Critical Care APRN Progress Note    NAME: Dane Walden - ROOM: 473/580-Y - MRN: [id number] - Age: 47year old - :1965    History Of Present Illness:  Dane Walden is a 47year old male with PMHx significant for T1 DM--no longer on insu Sleep apnea     pt wears oral device CPAP in past   • Splenic vein thrombosis    • Type 1 diabetes ([facility]     Dx at age 89 [facility]   • Type 1 diabetes mellitus ([facility]    • Type 1 diabetes mellitus with diabetic polyneuropathy ([facility] 5/9/2018     Past Surgical History trachea midline, no carotid bruits  Lungs: Clear to auscultation bilaterally with diminished posterior bases, respirations unlabored  Heart: Regular rate and rhythm, S1 and S2 normal, no murmur, rub or gallop  Abdomen: Soft, \"dull\" tenderness to deep pal resume in AM once dosing verified     7.   Sleep apnea--wore CPAP in past  -KLEVER protocol    F/E/N:    -IV hydration  -Follow electrolytes and replete as indicated  -ADAT per Endocrinology    Proph:  -SQ heparin  -SCD    Dispo:   -Full code  -ICU for close m

## 2019-03-23 NOTE — H&P
[facility] hospitalist H+P  PCP Minnie Coley MD  CC sleepy  HPI 46 yo male with multiple medical problems including but not limited to Depression, Type I DM, HTN, KLEVER came to ER with concern for diabetic ketoacidosis, has been sleepy and not taking glucos LUMBAR DISK,ONE LEVEL     • EYE CATARACT WITH INTRAOCULAR LENS Left 3/6/2017    Performed by Andrea Donis MD at [facility] Right 05/04/2018    Morphine 5.0 mg/cc R abdomen implant   • OTHER  2016    CERVICAL SURGERY   • OTHER meetings of clubs or organizations: Not on file        Relationship status: Not on file      Intimate partner violence:        Fear of current or ex partner: Not on file        Emotionally abused: Not on file        Physically abused: Not on file        Fo mg by mouth 2 (two) times daily. Disp:  Rfl:    BusPIRone HCl 10 MG Oral Tab Take 20 mg by mouth 3 (three) times daily. Disp:  Rfl:    ondansetron 4 MG Oral Tablet Dispersible Take 1 tablet (4 mg total) by mouth every 8 (eight) hours as needed for Nausea. 12.0   NEPRELIM  4.60   WBC  8.4   PLT  212.0     Na 135, K 4.2,Cl 101, Co2 22, BUN 24, Creatinine 1.03 gluocse 182  Lipase 29  UA negative nitrite, negative leukocytes  Troponin <0.045    CXR personally reviewed conclusion in Epic  =====  CONCLUSION:  Nor

## 2019-03-23 NOTE — ED INITIAL ASSESSMENT (HPI)
Pt states he checked his bs last noc 170 mg/dl. Pt states he didn't realize he was sleeping all day.

## 2019-03-23 NOTE — CONSULTS
[facility]      Endocrinology Consultation    Radha Rice Patient Status:  Inpatient    1965 MRN [id number]   [facility] 4SW-A Attending Toyin Mcneill MD   Hosp Day # 1 PCP Joyce Butler MD     Reason for Consu UP TO 60 UNITS VIA INSULIN PUMP DAILY Disp: 60 mL Rfl: 1 3/21/2019 at Unknown time   FLUoxetine HCl 20 MG Oral Cap TK 4 CS PO D Disp:  Rfl: 1 3/21/2019 at Unknown time   BASAGLAR KWIKPEN 100 UNIT/ML Subcutaneous Solution Pen-injector INJECT 42 UNITS UNDER Disp: 30 tablet Rfl: 0 Taking   Glucose Blood (CONTOUR NEXT TEST) In Vitro Strip Test 10 times daily Disp: 900 each Rfl: 3 Taking   CALEB CONTOUR NEXT TEST In Vitro Strip Test 7 times daily Disp: 700 strip Rfl: 3 Taking   Insulin Pump Accessories (PUMP REM Father         Type 2 DM   • Heart Disorder Father    • Hypertension Mother    • No Known Problems Son    • No Known Problems Sister    • Hypertension Brother    • No Known Problems Brother    • No Known Problems Brother       reports that he has been smok 50 mL, 50 mL, Intravenous, Q15 Min PRN **OR** glucose (DEX4) oral liquid 30 g, 30 g, Oral, Q15 Min PRN **OR** Glucose-Vitamin C (DEX-4) 4-6 GM-MG chewable tab 8 tablet, 8 tablet, Oral, Q15 Min PRN    Physical Exam:  /74   Pulse 94   Temp 98.5 °F (36. Uncontrolled T1DM with hyperglycemia and neuropathy  2. Long term insulin use  3.  DKA   - A1c 9.8% on Basaglar 42 QHS, Humalog 1:15 grams + 5:50 > 150 correction  - Will need dose adjustments at DC  - Anion gap has closed   - Transition to levemir 30 units

## 2019-03-23 NOTE — CONSULTS
Pulmonary Consult       NAME: Brady Aguilar - ROOM: 072/328-K - MRN: [id number] - Age: 47year old - :  1965    Date of Admission: 3/22/2019  7:18 PM  Admission Diagnosis: Type 1 diabetes mellitus with ketoacidosis without coma [facility] [E10.10] INTRAOCULAR LENS Left 3/6/2017    Performed by Lanie Quan MD at [address] [facility] Right 05/04/2018    Morphine 5.0 mg/cc R abdomen implant   • OTHER  2016    CERVICAL SURGERY   • OTHER SURGICAL HISTORY  10/12    left knee scoped Unknown time   ClonazePAM 0.5 MG Oral Tab Take 0.5 mg by mouth 2 (two) times daily as needed for Anxiety.  Disp:  Rfl:  3/21/2019 at Unknown time   Continuous Blood Gluc Sensor (FREESTYLE CAMERON 14 DAY SENSOR) Does not apply Misc 1 each by Does not apply rou activity: Not on file    Lifestyle      Physical activity:        Days per week: Not on file        Minutes per session: Not on file      Stress: Not on file    Relationships      Social connections:        Talks on phone: Not on file        Gets together: (03/23/19 0700)     PRN Medication:acetaminophen, ondansetron HCl, Metoclopramide HCl, Dextrose-NaCl, Dextrose-NaCl, glucose **OR** Glucose-Vitamin C **OR** dextrose **OR** glucose **OR** Glucose-Vitamin C     REVIEW OF SYSTEMS:   GENERAL: feels better  SK supraclavicular nodes normal.   Neurologic:  no focal deficits     Recent Results (from the past 24 hour(s))   POCT GLUCOSE    Collection Time: 03/22/19  7:25 PM   Result Value Ref Range    POC Glucose 404 (H) 70 - 99 mg/dL   COMP METABOLIC PANEL (14)    C 10(3)uL    MCV 93.1 80.0 - 100.0 fL    MCH 31.1 26.0 - 34.0 pg    MCHC 33.5 31.0 - 37.0 g/dL    RDW 12.4 11.0 - 15.0 %    RDW-SD 42.5 35.1 - 46.3 fL    Neutrophil Absolute Prelim 6.43 1.50 - 7.70 x10 (3) uL    Neutrophil Absolute 6.43 1.50 - 7.70 x10(3) uL PHOSPHORUS    Collection Time: 03/23/19 12:30 AM   Result Value Ref Range    Phosphorus 3.0 2.5 - 4.9 mg/dL   LIPASE    Collection Time: 03/23/19 12:30 AM   Result Value Ref Range    Lipase 29 (L) 73 - 393 U/L   LIPID PANEL    Collection Time: 03/23/19 1 10(3)uL    MCV 91.4 80.0 - 100.0 fL    MCH 30.5 26.0 - 34.0 pg    MCHC 33.3 31.0 - 37.0 g/dL    RDW 12.0 11.0 - 15.0 %    RDW-SD 40.5 35.1 - 46.3 fL    Neutrophil Absolute Prelim 4.60 1.50 - 7.70 x10 (3) uL    Neutrophil Absolute 4.60 1.50 - 7.70 x10(3) uL

## 2019-03-23 NOTE — CONSULTS
[facility]  Consultation Note      Nakia [place] Patient Status:  Inpatient    1965 MRN [id number]   [facility] 4SW-A Attending Andre Estes MD   Hosp Day # 1 PCP Demetrio Balderas MD     ADDENDUM: requested.       Medications:    Current Facility-Administered Medications:  insulin detemir (LEVEMIR) 100 UNIT/ML flextouch 30 Units 30 Units Subcutaneous Daily   Insulin Aspart Pen (NOVOLOG) 100 UNIT/ML flexpen 1-30 Units 1-30 Units Subcutaneous TID CC wears oral device CPAP in past   • Splenic vein thrombosis    • Type 1 diabetes (HCC)     Dx at age 40   • Type 1 diabetes mellitus ([facility]    • Type 1 diabetes mellitus with diabetic polyneuropathy ([facility] 5/9/2018       Past Surgical History:   Procedure Lat hematuria  Hematologic/lymphatic: negative for bleeding  Musculoskeletal:negative for myalgias  Neurological: negative for dizziness and headaches  Endocrine: negative for temperature intolerance      Physical Exam:  Blood pressure 113/76, pulse 92, temper TP 7.8 03/22/2019    AST 21 03/22/2019    ALT 36 03/22/2019    LIP 29 03/23/2019    ESRML 7 03/23/2019    CRP 0.32 03/23/2019    MG 1.9 03/23/2019    PHOS 2.8 03/23/2019    TROP <0.045 03/22/2019    PGLU 163 03/23/2019         Thank you for allowing our pr

## 2019-03-23 NOTE — PLAN OF CARE
Diabetes/Glucose Control    • Glucose maintained within prescribed range Not Progressing            NURSING ADMISSION NOTE      Patient admitted via Ambulance  Oriented to room. Safety precautions initiated. Bed in low position. Call light in reach.  Pt

## 2019-03-24 VITALS
BODY MASS INDEX: 22.47 KG/M2 | SYSTOLIC BLOOD PRESSURE: 108 MMHG | HEART RATE: 89 BPM | DIASTOLIC BLOOD PRESSURE: 66 MMHG | RESPIRATION RATE: 20 BRPM | OXYGEN SATURATION: 95 % | TEMPERATURE: 98 F | WEIGHT: 175.06 LBS | HEIGHT: 74 IN

## 2019-03-24 LAB
ANION GAP SERPL CALC-SCNC: 6 MMOL/L (ref 0–18)
BUN BLD-MCNC: 14 MG/DL (ref 7–18)
BUN/CREAT SERPL: 17.1 (ref 10–20)
CALCIUM BLD-MCNC: 8 MG/DL (ref 8.5–10.1)
CHLORIDE SERPL-SCNC: 101 MMOL/L (ref 98–107)
CO2 SERPL-SCNC: 28 MMOL/L (ref 21–32)
CREAT BLD-MCNC: 0.82 MG/DL (ref 0.7–1.3)
DEPRECATED RDW RBC AUTO: 41.3 FL (ref 35.1–46.3)
ERYTHROCYTE [DISTWIDTH] IN BLOOD BY AUTOMATED COUNT: 12.3 % (ref 11–15)
GLUCOSE BLD-MCNC: 236 MG/DL (ref 70–99)
GLUCOSE BLD-MCNC: 237 MG/DL (ref 70–99)
GLUCOSE BLD-MCNC: 308 MG/DL (ref 70–99)
HAV IGM SER QL: 2 MG/DL (ref 1.6–2.6)
HCT VFR BLD AUTO: 41.7 % (ref 39–53)
HGB BLD-MCNC: 14.2 G/DL (ref 13–17.5)
LIPASE SERPL-CCNC: 26 U/L (ref 73–393)
MCH RBC QN AUTO: 31.2 PG (ref 26–34)
MCHC RBC AUTO-ENTMCNC: 34.1 G/DL (ref 31–37)
MCV RBC AUTO: 91.6 FL (ref 80–100)
OSMOLALITY SERPL CALC.SUM OF ELEC: 288 MOSM/KG (ref 275–295)
PLATELET # BLD AUTO: 166 10(3)UL (ref 150–450)
POTASSIUM SERPL-SCNC: 4.1 MMOL/L (ref 3.5–5.1)
RBC # BLD AUTO: 4.55 X10(6)UL (ref 4.3–5.7)
SODIUM SERPL-SCNC: 135 MMOL/L (ref 136–145)
WBC # BLD AUTO: 4.5 X10(3) UL (ref 4–11)

## 2019-03-24 RX ORDER — DOCUSATE SODIUM 100 MG/1
100 CAPSULE, LIQUID FILLED ORAL 2 TIMES DAILY
Status: DISCONTINUED | OUTPATIENT
Start: 2019-03-24 | End: 2019-03-24

## 2019-03-24 RX ORDER — PSEUDOEPHEDRINE HCL 30 MG
100 TABLET ORAL 2 TIMES DAILY
Qty: 20 CAPSULE | Refills: 0 | Status: SHIPPED | OUTPATIENT
Start: 2019-03-24 | End: 2019-05-30

## 2019-03-24 RX ORDER — METOPROLOL SUCCINATE 50 MG/1
50 TABLET, EXTENDED RELEASE ORAL DAILY
Qty: 5 TABLET | Refills: 0 | Status: SHIPPED | OUTPATIENT
Start: 2019-03-24 | End: 2019-04-18

## 2019-03-24 NOTE — PLAN OF CARE
Diabetes/Glucose Control    • Glucose maintained within prescribed range Progressing        GASTROINTESTINAL - ADULT    • Minimal or absence of nausea and vomiting Progressing        METABOLIC/FLUID AND ELECTROLYTES - ADULT    • Glucose maintained within p

## 2019-03-24 NOTE — DISCHARGE SUMMARY
[facility]  Discharge Summary    Raisa Rice Patient Status:  Inpatient    1965 MRN [id number]   [facility] 8NE-A Attending No att. providers found   Hosp Day # 2 PCP Praneeth Barreto MD     Date of Admission: 3/22/201 with hyperglycemia, DKA  -pt was in ICU during admit, appreciate endocrine input     Malaise and fatigue for 2 days prior to admit, elevated glucose and acidosis; may be due to DKA  Pt afebrile, no leukocytosis   respiratory flu panel negative  Pt had a te WITH MEALS, Historical, R-11, ROSALINO    HUMALOG 100 UNIT/ML Subcutaneous Solution  ADMINISTER UP TO 60 UNITS VIA INSULIN PUMP DAILY, Normal, Disp-60 mL, R-1, ROSALINO    FLUoxetine HCl 20 MG Oral Cap  TK 4 CS PO D, Historical, R-1    BASAGLAR KWIKPEN 100 UNIT/ML S Pump Accessories (PUMP REMOTE ) Does not apply Misc  Historical    !! - Potential duplicate medications found. Please discuss with provider. Follow up Visits: Follow-up Information     Katie Flores MD In 1 week.     Specialty: uncontrolled pain  4. redness, tenderness, or signs of infection (pain, swelling, redness, odor or green/yellow discharge)  5.  difficulty breathing, headache or visual disturbances  6. hives  7. persistent dizziness or light-headedness  8. extreme fatigue

## 2019-03-24 NOTE — PLAN OF CARE
PLAN OF CARE - SHIFT NOTE      Patient is ANOx4, on RA (2L NC per patient preference, hx KLEVER and patient doesn't currently use CPAP), tele NSR with chronic ST Elevation noted (cardiology aware), cont B/B, UAL. Patient was a transfer from ICU around 2100.  A

## 2019-03-24 NOTE — PROGRESS NOTES
[facility]  Endocrinology Progress Note    Simona Bailon Dwayne Patient Status:  Inpatient    1965 MRN [id number]   [facility] 8NE-A Attending Wes Hwang MD   Hosp Day # 2 PCP Mya Cornejo MD     Subjective:  Cannot recal mg/dL 139 (H) 96 130 (H) 171 (H) 236 (H)     Component      Latest Ref Rng & Units 3/14/2019   HEMOGLOBIN A1C      4.3 - 5.6 % 9.8 (A)     Impression and Plan:    1. Uncontrolled T1DM with hyperglycemia and neuropathy  2. Long term insulin use  3.  DKA   -

## 2019-03-26 NOTE — PAYOR COMM NOTE
--------------  ADMISSION REVIEW     Payor: Eduardo Foy #:  [id number]  Authorization Number: [id number]    Admit date: 3/22/19  Admit time: 2140         Patient Seen in: [facility] [place] ESOPHAGOGASTRODUODENOSCOPY (EGD) N/A 11/14/2017    Performed by Triston Gillette MD at [address] [place] [address]     • [place] Left 3/6/2017    Performed by Charley Nance MD at [address] [facility] following components:    POC Glucose 404 (*)    EKG    Rate, intervals and axes as noted on EKG Report. Rate: 110  Rhythm: Sinus Rhythm  Reading: Sinus tachycardia. Does have diffuse J-point elevation somewhat different than previous EKG.   Axis/intervals diabetes [facility])     Dx at age 40   • Type 1 diabetes mellitus ([facility] [address]    • Type 1 diabetes mellitus with diabetic polyneuropathy ([facility] 5/9/2018     Past Surgical History:   Procedure Laterality Date   • BACK SURGERY  2007    fusion L5-S1   • CATARACT  10/30/1 no leukocytosis  Check respiratory flu panel  Pt had a temp of 100.4 at midnight, check blood culture. Check lactic acid.  UA negative nitrite/leukocytes, OP clear on exam, lungs clear on exam      Nausea; check KUB  zofran PRN    Abnormal EKG; consult card

## 2019-04-01 PROBLEM — J06.9 UPPER RESPIRATORY TRACT INFECTION, UNSPECIFIED TYPE: Status: RESOLVED | Noted: 2018-10-22 | Resolved: 2019-04-01

## 2019-04-01 PROBLEM — J18.9 COMMUNITY ACQUIRED PNEUMONIA, UNSPECIFIED LATERALITY: Status: RESOLVED | Noted: 2018-12-05 | Resolved: 2019-04-01

## 2019-04-01 PROBLEM — E87.2 METABOLIC ACIDOSIS: Status: RESOLVED | Noted: 2018-02-13 | Resolved: 2019-04-01

## 2019-04-01 PROBLEM — G56.03 CARPAL TUNNEL SYNDROME ON BOTH SIDES: Status: RESOLVED | Noted: 2017-06-25 | Resolved: 2019-04-01

## 2019-04-01 PROBLEM — G56.23 ULNAR NEUROPATHY OF BOTH UPPER EXTREMITIES: Status: RESOLVED | Noted: 2017-06-25 | Resolved: 2019-04-01

## 2019-04-01 PROBLEM — E87.1 HYPONATREMIA: Status: RESOLVED | Noted: 2018-10-22 | Resolved: 2019-04-01

## 2019-04-01 PROBLEM — R73.9 HYPERGLYCEMIA: Status: RESOLVED | Noted: 2018-10-22 | Resolved: 2019-04-01

## 2019-04-01 PROBLEM — E87.20 METABOLIC ACIDOSIS: Status: RESOLVED | Noted: 2018-02-13 | Resolved: 2019-04-01

## 2019-04-01 PROBLEM — N17.9 ACUTE KIDNEY INJURY (HCC): Status: RESOLVED | Noted: 2019-03-22 | Resolved: 2019-04-01

## 2019-04-01 PROBLEM — E78.5 DYSLIPIDEMIA: Status: RESOLVED | Noted: 2018-02-23 | Resolved: 2019-04-01

## 2019-04-01 PROBLEM — J18.9 COMMUNITY ACQUIRED PNEUMONIA: Status: RESOLVED | Noted: 2018-12-05 | Resolved: 2019-04-01

## 2019-04-01 PROBLEM — R79.89 AZOTEMIA: Status: RESOLVED | Noted: 2018-10-22 | Resolved: 2019-04-01

## 2019-04-01 PROBLEM — E16.2 HYPOGLYCEMIA: Status: RESOLVED | Noted: 2018-12-12 | Resolved: 2019-04-01

## 2019-04-23 PROBLEM — K52.9 FREQUENT STOOLS: Status: RESOLVED | Noted: 2018-12-05 | Resolved: 2019-04-23

## 2019-07-15 ENCOUNTER — ORDER TRANSCRIPTION (OUTPATIENT)
Dept: SLEEP CENTER | Age: 54
End: 2019-07-15

## 2019-07-15 DIAGNOSIS — G47.33 OSA (OBSTRUCTIVE SLEEP APNEA): Primary | ICD-10-CM

## 2019-09-02 PROBLEM — K86.0 ALCOHOL-INDUCED CHRONIC PANCREATITIS (HCC): Status: ACTIVE | Noted: 2019-09-02

## 2019-09-29 ENCOUNTER — APPOINTMENT (OUTPATIENT)
Dept: GENERAL RADIOLOGY | Age: 54
End: 2019-09-29
Attending: EMERGENCY MEDICINE
Payer: MEDICAID

## 2019-09-29 ENCOUNTER — APPOINTMENT (OUTPATIENT)
Dept: CT IMAGING | Age: 54
End: 2019-09-29
Attending: EMERGENCY MEDICINE
Payer: MEDICAID

## 2019-09-29 ENCOUNTER — HOSPITAL ENCOUNTER (EMERGENCY)
Age: 54
Discharge: HOME | End: 2019-09-29
Attending: EMERGENCY MEDICINE
Payer: MEDICAID

## 2019-09-29 VITALS
OXYGEN SATURATION: 100 % | HEIGHT: 74 IN | TEMPERATURE: 98 F | SYSTOLIC BLOOD PRESSURE: 129 MMHG | HEART RATE: 84 BPM | DIASTOLIC BLOOD PRESSURE: 90 MMHG | WEIGHT: 190 LBS | RESPIRATION RATE: 16 BRPM | BODY MASS INDEX: 24.38 KG/M2

## 2019-09-29 DIAGNOSIS — R10.12 ABDOMINAL PAIN, LEFT UPPER QUADRANT: Primary | ICD-10-CM

## 2019-09-29 PROCEDURE — 85025 COMPLETE CBC W/AUTO DIFF WBC: CPT | Performed by: EMERGENCY MEDICINE

## 2019-09-29 PROCEDURE — 96360 HYDRATION IV INFUSION INIT: CPT

## 2019-09-29 PROCEDURE — 83690 ASSAY OF LIPASE: CPT | Performed by: EMERGENCY MEDICINE

## 2019-09-29 PROCEDURE — 81003 URINALYSIS AUTO W/O SCOPE: CPT | Performed by: EMERGENCY MEDICINE

## 2019-09-29 PROCEDURE — 93010 ELECTROCARDIOGRAM REPORT: CPT

## 2019-09-29 PROCEDURE — 80053 COMPREHEN METABOLIC PANEL: CPT | Performed by: EMERGENCY MEDICINE

## 2019-09-29 PROCEDURE — 85379 FIBRIN DEGRADATION QUANT: CPT | Performed by: EMERGENCY MEDICINE

## 2019-09-29 PROCEDURE — 96361 HYDRATE IV INFUSION ADD-ON: CPT

## 2019-09-29 PROCEDURE — 85610 PROTHROMBIN TIME: CPT | Performed by: EMERGENCY MEDICINE

## 2019-09-29 PROCEDURE — 74177 CT ABD & PELVIS W/CONTRAST: CPT | Performed by: EMERGENCY MEDICINE

## 2019-09-29 PROCEDURE — 99285 EMERGENCY DEPT VISIT HI MDM: CPT

## 2019-09-29 PROCEDURE — 93005 ELECTROCARDIOGRAM TRACING: CPT

## 2019-09-29 PROCEDURE — 71045 X-RAY EXAM CHEST 1 VIEW: CPT | Performed by: EMERGENCY MEDICINE

## 2019-09-29 PROCEDURE — 85730 THROMBOPLASTIN TIME PARTIAL: CPT | Performed by: EMERGENCY MEDICINE

## 2019-09-29 PROCEDURE — 84484 ASSAY OF TROPONIN QUANT: CPT | Performed by: EMERGENCY MEDICINE

## 2019-09-29 RX ORDER — SODIUM CHLORIDE 9 MG/ML
INJECTION, SOLUTION INTRAVENOUS CONTINUOUS
Status: DISCONTINUED | OUTPATIENT
Start: 2019-09-29 | End: 2019-09-29

## 2019-09-29 NOTE — ED INITIAL ASSESSMENT (HPI)
Pt c/o left upper quadrant pain that started today with left arm pain. Denies SOB, dizziness, lightheadedness, and nausea. Denies diarrhea, vomiting, or fever.

## 2019-09-29 NOTE — ED PROVIDER NOTES
[facility] [place]      History   Patient presents with:  Abdomen/Flank Pain (GI/)    Stated Complaint: LUQ ABD PAIN AND LEFT ARM PAIN    HPI    Patient has a history of diabetes with recent hemoglobin A1c at 9.8 disorder)    • Rheumatoid arthritis(714.0)    • Sleep apnea     pt wears oral device CPAP in past   • Splenic vein thrombosis    • Type 1 diabetes (HCC)     Dx at age 40   • Type 1 diabetes mellitus ([facility]    • Type 1 diabetes mellitus with diabetic polyneu 24.39 kg/m²         Physical Exam  General: The patient is awake, alert, conversant. Patient answers questions quickly and appropriately.   Eyes: sclera white, conjunctiva pink and moist.  Lids and lashes are normal.  Nose: Unremarkable  Throat: Posterior panel order CBC WITH DIFFERENTIAL WITH PLATELET.   Procedure                               Abnormality         Status                     ---------                               -----------         ------                     CBC W/ DIFFERENTIAL[[id number]] abdomen pelvis  CONCLUSION:     1. No acute intra-abdominal/pelvic process identified.   2. Stable marked atrophy of the pancreas with dilatation the pancreatic duct measuring up to 9 mm in diameter.        On repeat examination, patient appears comfortable

## 2019-12-31 NOTE — ED NOTES
EKG    Rate, intervals and axes as noted on EKG Report.   Rate: 91  Rhythm: Sinus Rhythm  Reading: No acute ischemic change noted PT DAILY TREATMENT NOTE - [facility] 2-15    Patient Name: Vini Chavez  Date:2019  : 1981  [x]  Patient  Verified  Payor: Omayra Shen / Plan: [place] Sam [id number] PPO / Product Type: PPO /    In time: 9:30 am   Out time: 10:30 am   Total Treatment Time (min): 55  Total Timed Codes (min): 55  1:1 Treatment Time ( only): 45   Visit #:  5    Treatment Area: Pain in left knee [M25.562]    SUBJECTIVE  Pain Level (0-10 scale): 0/10  Any medication changes, allergies to medications, adverse drug reactions, diagnosis change, or new procedure performed?: [x] No    [] Yes (see summary sheet for update)  Subjective functional status/changes:   [] No changes reported  Pt reports that he is having a lot of trouble finding the right shoes.  He reports that he has not real    OBJECTIVE     30 min Neuromuscular Re-education:  [x]  See flow sheet :   Rationale: increase ROM, increase strength, improve coordination, improve balance and increase proprioception  to improve the patients ability to return to N ADL skills    15 min Gait Training: shoe test and retest and UE swing as well as stance and swing phases of gait cued   Rationale: increase ROM, increase strength, improve coordination, improve balance and increase proprioception  to improve the patients ability to return to N ADL skills            With   [] TE   [] TA   [x] neuro   [] other: Patient Education: [x] Review HEP    [x] Progressed/Changed HEP based on:   [] positioning   [] body mechanics   [] transfers   [] heat/ice application    [] other:      Other Objective/Functional Measures:   Beginning of session   - HGIR bilaterally  L Horiz Abd 50 R 75 (corrected after intervention)   - HaDDT L, - R   - PADT L, - R        Pain Level (0-10 scale) post treatment: 0/10      ASSESSMENT/Changes in Function:   Patient will continue to benefit from skilled PT services to modify and progress therapeutic interventions, address functional mobility deficits, address ROM deficits, address strength deficits, analyze and address soft tissue restrictions, analyze and cue movement patterns, analyze and modify body mechanics/ergonomics, assess and modify postural abnormalities and instruct in home and community integration to attain remaining goals.      []  See Plan of Care  [x]  See progress note from last visit/recertification  []  See Discharge Summary         Progress towards goals / Updated goals:  See progress note    PLAN  []  Upgrade activities as tolerated     [x]  Continue plan of care  [x]  Update interventions per flow sheet       []  Discharge due to:_  []  Other:_        Ji Barahona, PT, DPT, [facility]  12/31/2019

## 2020-01-04 PROBLEM — E10.10 TYPE 1 DIABETES MELLITUS WITH KETOACIDOSIS WITHOUT COMA (HCC): Status: RESOLVED | Noted: 2018-10-22 | Resolved: 2020-01-04

## 2020-01-04 PROBLEM — E55.9 VITAMIN D DEFICIENCY: Status: ACTIVE | Noted: 2020-01-04

## 2020-01-04 PROBLEM — K86.1 CHRONIC PANCREATITIS, UNSPECIFIED PANCREATITIS TYPE (HCC): Status: RESOLVED | Noted: 2018-01-02 | Resolved: 2020-01-04

## 2020-01-12 PROBLEM — F31.32 BIPOLAR AFFECTIVE DISORDER, CURRENTLY DEPRESSED, MODERATE (HCC): Status: ACTIVE | Noted: 2020-01-12

## 2020-02-03 PROBLEM — Z98.890 H/O ENDOSCOPIC SINUS SURGERY: Status: ACTIVE | Noted: 2020-02-03

## 2020-02-03 PROBLEM — Z98.890 H/O NASAL SEPTOPLASTY: Status: ACTIVE | Noted: 2020-02-03

## 2020-03-23 ENCOUNTER — HOSPITAL ENCOUNTER (INPATIENT)
Facility: HOSPITAL | Age: 55
LOS: 2 days | Discharge: HOME | DRG: 638 | End: 2020-03-25
Attending: EMERGENCY MEDICINE | Admitting: INTERNAL MEDICINE
Payer: MEDICAID

## 2020-03-23 ENCOUNTER — APPOINTMENT (OUTPATIENT)
Dept: GENERAL RADIOLOGY | Age: 55
DRG: 638 | End: 2020-03-23
Attending: PHYSICIAN ASSISTANT
Payer: MEDICAID

## 2020-03-23 DIAGNOSIS — J20.9 ACUTE BRONCHITIS, UNSPECIFIED ORGANISM: ICD-10-CM

## 2020-03-23 DIAGNOSIS — N28.9 ACUTE RENAL INSUFFICIENCY: ICD-10-CM

## 2020-03-23 DIAGNOSIS — E10.10 TYPE 1 DIABETES MELLITUS WITH KETOACIDOSIS WITHOUT COMA (HCC): Primary | ICD-10-CM

## 2020-03-23 PROBLEM — E87.1 HYPONATREMIA: Status: ACTIVE | Noted: 2020-03-23

## 2020-03-23 PROBLEM — E87.2 METABOLIC ACIDOSIS: Status: ACTIVE | Noted: 2020-03-23

## 2020-03-23 PROBLEM — R79.89 AZOTEMIA: Status: ACTIVE | Noted: 2020-03-23

## 2020-03-23 PROBLEM — E87.20 METABOLIC ACIDOSIS: Status: ACTIVE | Noted: 2020-03-23

## 2020-03-23 LAB
ADENOVIRUS PCR:: NEGATIVE
ALBUMIN SERPL-MCNC: 3.8 G/DL (ref 3.4–5)
ALBUMIN/GLOB SERPL: 0.9 {RATIO} (ref 1–2)
ALP LIVER SERPL-CCNC: 93 U/L (ref 45–117)
ALT SERPL-CCNC: 23 U/L (ref 16–61)
ANION GAP SERPL CALC-SCNC: 16 MMOL/L (ref 0–18)
ANION GAP SERPL CALC-SCNC: 6 MMOL/L (ref 0–18)
AST SERPL-CCNC: 14 U/L (ref 15–37)
B PERT DNA SPEC QL NAA+PROBE: NEGATIVE
BASOPHILS # BLD AUTO: 0.12 X10(3) UL (ref 0–0.2)
BASOPHILS NFR BLD AUTO: 1.5 %
BILIRUB SERPL-MCNC: 0.6 MG/DL (ref 0.1–2)
BILIRUB UR QL STRIP.AUTO: NEGATIVE
BUN BLD-MCNC: 24 MG/DL (ref 7–18)
BUN BLD-MCNC: 36 MG/DL (ref 7–18)
BUN/CREAT SERPL: 23.7 (ref 10–20)
BUN/CREAT SERPL: 24.5 (ref 10–20)
C PNEUM DNA SPEC QL NAA+PROBE: NEGATIVE
CALCIUM BLD-MCNC: 8.6 MG/DL (ref 8.5–10.1)
CALCIUM BLD-MCNC: 9.6 MG/DL (ref 8.5–10.1)
CHLORIDE SERPL-SCNC: 100 MMOL/L (ref 98–112)
CHLORIDE SERPL-SCNC: 88 MMOL/L (ref 98–112)
CLARITY UR REFRACT.AUTO: CLEAR
CO2 SERPL-SCNC: 21 MMOL/L (ref 21–32)
CO2 SERPL-SCNC: 27 MMOL/L (ref 21–32)
COLOR UR AUTO: YELLOW
CORONAVIRUS 229E PCR:: NEGATIVE
CORONAVIRUS HKU1 PCR:: NEGATIVE
CORONAVIRUS NL63 PCR:: NEGATIVE
CORONAVIRUS OC43 PCR:: NEGATIVE
CREAT BLD-MCNC: 0.98 MG/DL (ref 0.7–1.3)
CREAT BLD-MCNC: 1.52 MG/DL (ref 0.7–1.3)
CRP SERPL-MCNC: 1.03 MG/DL (ref ?–0.3)
DEPRECATED HBV CORE AB SER IA-ACNC: 194.6 NG/ML (ref 30–530)
DEPRECATED RDW RBC AUTO: 41.1 FL (ref 35.1–46.3)
EOSINOPHIL # BLD AUTO: 0.11 X10(3) UL (ref 0–0.7)
EOSINOPHIL NFR BLD AUTO: 1.3 %
ERYTHROCYTE [DISTWIDTH] IN BLOOD BY AUTOMATED COUNT: 11.9 % (ref 11–15)
FLUAV RNA SPEC QL NAA+PROBE: NEGATIVE
FLUBV RNA SPEC QL NAA+PROBE: NEGATIVE
GLOBULIN PLAS-MCNC: 4.3 G/DL (ref 2.8–4.4)
GLUCOSE BLD-MCNC: 140 MG/DL (ref 70–99)
GLUCOSE BLD-MCNC: 166 MG/DL (ref 70–99)
GLUCOSE BLD-MCNC: 173 MG/DL (ref 70–99)
GLUCOSE BLD-MCNC: 174 MG/DL (ref 70–99)
GLUCOSE BLD-MCNC: 179 MG/DL (ref 70–99)
GLUCOSE BLD-MCNC: 193 MG/DL (ref 70–99)
GLUCOSE BLD-MCNC: 254 MG/DL (ref 70–99)
GLUCOSE BLD-MCNC: 256 MG/DL (ref 70–99)
GLUCOSE BLD-MCNC: 347 MG/DL (ref 70–99)
GLUCOSE BLD-MCNC: 454 MG/DL (ref 70–99)
GLUCOSE BLD-MCNC: 504 MG/DL (ref 70–99)
GLUCOSE UR STRIP.AUTO-MCNC: 500 MG/DL
HCT VFR BLD AUTO: 49 % (ref 39–53)
HGB BLD-MCNC: 16.8 G/DL (ref 13–17.5)
IMM GRANULOCYTES # BLD AUTO: 0.02 X10(3) UL (ref 0–1)
IMM GRANULOCYTES NFR BLD: 0.2 %
ISTAT BLOOD GAS BASE EXCESS: -7 MMOL/L (ref ?–30)
ISTAT BLOOD GAS FIO2: 21 %
ISTAT BLOOD GAS HCO3: 20.7 MEQ/L (ref 22–26)
ISTAT BLOOD GAS O2 SATURATION: 43 % (ref 60–85)
ISTAT BLOOD GAS PCO2: 47.4 MMHG (ref 38–50)
ISTAT BLOOD GAS PH: 7.25 (ref 7.32–7.43)
ISTAT BLOOD GAS TCO2: 22 MMOL/L (ref 22–32)
KETONES UR STRIP.AUTO-MCNC: >=160 MG/DL
LDH SERPL L TO P-CCNC: 230 U/L
LEUKOCYTE ESTERASE UR QL STRIP.AUTO: NEGATIVE
LIPASE SERPL-CCNC: 19 U/L (ref 73–393)
LYMPHOCYTES # BLD AUTO: 1.55 X10(3) UL (ref 1–4)
LYMPHOCYTES NFR BLD AUTO: 18.8 %
M PROTEIN MFR SERPL ELPH: 8.1 G/DL (ref 6.4–8.2)
MCH RBC QN AUTO: 31.8 PG (ref 26–34)
MCHC RBC AUTO-ENTMCNC: 34.3 G/DL (ref 31–37)
MCV RBC AUTO: 92.6 FL (ref 80–100)
METAPNEUMOVIRUS PCR:: NEGATIVE
MONOCYTES # BLD AUTO: 0.83 X10(3) UL (ref 0.1–1)
MONOCYTES NFR BLD AUTO: 10 %
MYCOPLASMA PNEUMONIA PCR:: NEGATIVE
NEUTROPHILS # BLD AUTO: 5.63 X10 (3) UL (ref 1.5–7.7)
NEUTROPHILS # BLD AUTO: 5.63 X10(3) UL (ref 1.5–7.7)
NEUTROPHILS NFR BLD AUTO: 68.2 %
NITRITE UR QL STRIP.AUTO: NEGATIVE
OSMOLALITY SERPL CALC.SUM OF ELEC: 284 MOSM/KG (ref 275–295)
OSMOLALITY SERPL CALC.SUM OF ELEC: 291 MOSM/KG (ref 275–295)
PARAINFLUENZA 1 PCR:: NEGATIVE
PARAINFLUENZA 2 PCR:: NEGATIVE
PARAINFLUENZA 3 PCR:: NEGATIVE
PARAINFLUENZA 4 PCR:: NEGATIVE
PH UR STRIP.AUTO: 5 [PH] (ref 4.5–8)
PLATELET # BLD AUTO: 255 10(3)UL (ref 150–450)
POTASSIUM SERPL-SCNC: 4.4 MMOL/L (ref 3.5–5.1)
POTASSIUM SERPL-SCNC: 4.6 MMOL/L (ref 3.5–5.1)
PROCALCITONIN SERPL-MCNC: <0.05 NG/ML (ref ?–0.16)
PROT UR STRIP.AUTO-MCNC: NEGATIVE MG/DL
RBC # BLD AUTO: 5.29 X10(6)UL (ref 4.3–5.7)
RBC UR QL AUTO: NEGATIVE
RHINOVIRUS/ENTERO PCR:: NEGATIVE
RSV RNA SPEC QL NAA+PROBE: NEGATIVE
SODIUM SERPL-SCNC: 125 MMOL/L (ref 136–145)
SODIUM SERPL-SCNC: 133 MMOL/L (ref 136–145)
SP GR UR STRIP.AUTO: 1.01 (ref 1–1.03)
UROBILINOGEN UR STRIP.AUTO-MCNC: 0.2 MG/DL
WBC # BLD AUTO: 8.3 X10(3) UL (ref 4–11)

## 2020-03-23 PROCEDURE — 83690 ASSAY OF LIPASE: CPT | Performed by: PHYSICIAN ASSISTANT

## 2020-03-23 PROCEDURE — 93005 ELECTROCARDIOGRAM TRACING: CPT

## 2020-03-23 PROCEDURE — 96366 THER/PROPH/DIAG IV INF ADDON: CPT

## 2020-03-23 PROCEDURE — 87798 DETECT AGENT NOS DNA AMP: CPT | Performed by: PHYSICIAN ASSISTANT

## 2020-03-23 PROCEDURE — 82962 GLUCOSE BLOOD TEST: CPT

## 2020-03-23 PROCEDURE — 83615 LACTATE (LD) (LDH) ENZYME: CPT | Performed by: INTERNAL MEDICINE

## 2020-03-23 PROCEDURE — 71045 X-RAY EXAM CHEST 1 VIEW: CPT | Performed by: PHYSICIAN ASSISTANT

## 2020-03-23 PROCEDURE — 87081 CULTURE SCREEN ONLY: CPT | Performed by: INTERNAL MEDICINE

## 2020-03-23 PROCEDURE — 87486 CHLMYD PNEUM DNA AMP PROBE: CPT | Performed by: PHYSICIAN ASSISTANT

## 2020-03-23 PROCEDURE — 99285 EMERGENCY DEPT VISIT HI MDM: CPT

## 2020-03-23 PROCEDURE — 86140 C-REACTIVE PROTEIN: CPT | Performed by: INTERNAL MEDICINE

## 2020-03-23 PROCEDURE — 96361 HYDRATE IV INFUSION ADD-ON: CPT

## 2020-03-23 PROCEDURE — 82728 ASSAY OF FERRITIN: CPT | Performed by: INTERNAL MEDICINE

## 2020-03-23 PROCEDURE — 85025 COMPLETE CBC W/AUTO DIFF WBC: CPT | Performed by: PHYSICIAN ASSISTANT

## 2020-03-23 PROCEDURE — 93010 ELECTROCARDIOGRAM REPORT: CPT | Performed by: INTERNAL MEDICINE

## 2020-03-23 PROCEDURE — 84145 PROCALCITONIN (PCT): CPT | Performed by: INTERNAL MEDICINE

## 2020-03-23 PROCEDURE — 81003 URINALYSIS AUTO W/O SCOPE: CPT | Performed by: EMERGENCY MEDICINE

## 2020-03-23 PROCEDURE — 87633 RESP VIRUS 12-25 TARGETS: CPT | Performed by: PHYSICIAN ASSISTANT

## 2020-03-23 PROCEDURE — 87999 UNLISTED MICROBIOLOGY PX: CPT

## 2020-03-23 PROCEDURE — 96365 THER/PROPH/DIAG IV INF INIT: CPT

## 2020-03-23 PROCEDURE — 87581 M.PNEUMON DNA AMP PROBE: CPT | Performed by: PHYSICIAN ASSISTANT

## 2020-03-23 PROCEDURE — 80053 COMPREHEN METABOLIC PANEL: CPT | Performed by: PHYSICIAN ASSISTANT

## 2020-03-23 PROCEDURE — 82803 BLOOD GASES ANY COMBINATION: CPT

## 2020-03-23 RX ORDER — DEXTROSE AND SODIUM CHLORIDE 5; .45 G/100ML; G/100ML
150 INJECTION, SOLUTION INTRAVENOUS CONTINUOUS PRN
Status: DISCONTINUED | OUTPATIENT
Start: 2020-03-23 | End: 2020-03-24

## 2020-03-23 RX ORDER — HYDROCODONE BITARTRATE AND ACETAMINOPHEN 10; 325 MG/1; MG/1
1 TABLET ORAL EVERY 6 HOURS PRN
Status: DISCONTINUED | OUTPATIENT
Start: 2020-03-23 | End: 2020-03-25

## 2020-03-23 RX ORDER — NICOTINE 21 MG/24HR
1 PATCH, TRANSDERMAL 24 HOURS TRANSDERMAL NIGHTLY
Status: DISCONTINUED | OUTPATIENT
Start: 2020-03-23 | End: 2020-03-25

## 2020-03-23 RX ORDER — MIRTAZAPINE 15 MG/1
45 TABLET, FILM COATED ORAL NIGHTLY
Status: DISCONTINUED | OUTPATIENT
Start: 2020-03-23 | End: 2020-03-25

## 2020-03-23 RX ORDER — CLONAZEPAM 0.5 MG/1
0.5 TABLET ORAL 2 TIMES DAILY PRN
Status: DISCONTINUED | OUTPATIENT
Start: 2020-03-23 | End: 2020-03-24

## 2020-03-23 RX ORDER — SODIUM CHLORIDE 9 MG/ML
INJECTION, SOLUTION INTRAVENOUS
Status: COMPLETED
Start: 2020-03-23 | End: 2020-03-23

## 2020-03-23 RX ORDER — ONDANSETRON 2 MG/ML
4 INJECTION INTRAMUSCULAR; INTRAVENOUS EVERY 4 HOURS PRN
Status: DISCONTINUED | OUTPATIENT
Start: 2020-03-23 | End: 2020-03-23

## 2020-03-23 RX ORDER — BUPROPION HYDROCHLORIDE 150 MG/1
150 TABLET ORAL DAILY
Status: DISCONTINUED | OUTPATIENT
Start: 2020-03-23 | End: 2020-03-25

## 2020-03-23 RX ORDER — DEXTROSE MONOHYDRATE 25 G/50ML
50 INJECTION, SOLUTION INTRAVENOUS
Status: DISCONTINUED | OUTPATIENT
Start: 2020-03-23 | End: 2020-03-25

## 2020-03-23 RX ORDER — ONDANSETRON 2 MG/ML
4 INJECTION INTRAMUSCULAR; INTRAVENOUS EVERY 6 HOURS PRN
Status: DISCONTINUED | OUTPATIENT
Start: 2020-03-23 | End: 2020-03-24

## 2020-03-23 RX ORDER — BENZONATATE 200 MG/1
200 CAPSULE ORAL 3 TIMES DAILY
Status: DISCONTINUED | OUTPATIENT
Start: 2020-03-23 | End: 2020-03-25

## 2020-03-23 RX ORDER — ACETAMINOPHEN 325 MG/1
650 TABLET ORAL EVERY 6 HOURS PRN
Status: DISCONTINUED | OUTPATIENT
Start: 2020-03-23 | End: 2020-03-25

## 2020-03-23 RX ORDER — SODIUM CHLORIDE 9 MG/ML
INJECTION, SOLUTION INTRAVENOUS CONTINUOUS
Status: DISCONTINUED | OUTPATIENT
Start: 2020-03-23 | End: 2020-03-23

## 2020-03-23 RX ORDER — PANTOPRAZOLE SODIUM 20 MG/1
20 TABLET, DELAYED RELEASE ORAL
Status: DISCONTINUED | OUTPATIENT
Start: 2020-03-23 | End: 2020-03-25

## 2020-03-23 RX ORDER — RISPERIDONE 1 MG/1
2 TABLET, FILM COATED ORAL NIGHTLY
Status: DISCONTINUED | OUTPATIENT
Start: 2020-03-23 | End: 2020-03-25

## 2020-03-23 RX ORDER — ENOXAPARIN SODIUM 100 MG/ML
40 INJECTION SUBCUTANEOUS DAILY
Status: DISCONTINUED | OUTPATIENT
Start: 2020-03-23 | End: 2020-03-25

## 2020-03-23 RX ORDER — CYCLOBENZAPRINE HCL 10 MG
10 TABLET ORAL 3 TIMES DAILY PRN
Status: DISCONTINUED | OUTPATIENT
Start: 2020-03-23 | End: 2020-03-25

## 2020-03-23 RX ORDER — SODIUM CHLORIDE 9 MG/ML
INJECTION, SOLUTION INTRAVENOUS ONCE
Status: COMPLETED | OUTPATIENT
Start: 2020-03-23 | End: 2020-03-23

## 2020-03-23 RX ORDER — METOCLOPRAMIDE HYDROCHLORIDE 5 MG/ML
10 INJECTION INTRAMUSCULAR; INTRAVENOUS EVERY 8 HOURS PRN
Status: DISCONTINUED | OUTPATIENT
Start: 2020-03-23 | End: 2020-03-25

## 2020-03-23 RX ORDER — FLUTICASONE PROPIONATE 50 MCG
2 SPRAY, SUSPENSION (ML) NASAL DAILY
Status: DISCONTINUED | OUTPATIENT
Start: 2020-03-24 | End: 2020-03-25

## 2020-03-23 RX ORDER — BUSPIRONE HYDROCHLORIDE 10 MG/1
20 TABLET ORAL 3 TIMES DAILY
Status: DISCONTINUED | OUTPATIENT
Start: 2020-03-23 | End: 2020-03-25

## 2020-03-23 RX ORDER — METOPROLOL SUCCINATE 50 MG/1
50 TABLET, EXTENDED RELEASE ORAL
Status: DISCONTINUED | OUTPATIENT
Start: 2020-03-24 | End: 2020-03-25

## 2020-03-23 NOTE — ED INITIAL ASSESSMENT (HPI)
Pt states cough for one week, vomting since yest, fatigue.  Flew home from [employer], visiting family 2 had a cough

## 2020-03-23 NOTE — H&P
[facility] Hospitalist H&P       CC: Patient presents with:  Vomiting       PCP: José Townsend MD    History of Present Illness:  Mr. David Cabrales is a 50 yo male with PMH of DM type I, HTN, chronic pancreatitis, GERD, chronic back pain (has morphine pump) wh INTRAOCULAR LENS Left 3/6/2017    Performed by Andrea Donis MD at [facility] Right 05/04/2018    Morphine 5.0 mg/cc R abdomen implant   • OTHER  2016    CERVICAL SURGERY   • OTHER SURGICAL HISTORY  10/12    left knee scoped meals., Disp: 270 capsule, Rfl: 3  Cholecalciferol 50 MCG (2000 UT) Oral Tab, Take 1 tablet (2,000 Units total) by mouth daily. , Disp: 90 tablet, Rfl: 3  ondansetron 4 MG Oral Tablet Dispersible, Take 1 tablet (4 mg total) by mouth every 8 (eight) hours as Take 15 mg by mouth 2 (two) times daily. , Disp: , Rfl:   BusPIRone HCl 10 MG Oral Tab, Take 20 mg by mouth 3 (three) times daily. , Disp: , Rfl:   Insulin Pump Accessories (PUMP REMOTE ) Does not apply Misc, , Disp: , Rfl:   mirtazapine 45 MG Oral moving all extremities, no edema  Neuro: no focal deficits  Skin: no rashes/lesions  Psych: normal mood/affect        Diagnostic Data:    CBC/Chem  Recent Labs   Lab 03/23/20  1244   WBC 8.3   HGB 16.8   MCV 92.6   .0       Recent Labs   Lab 03/23/2 chest radiograph was obtained.   COMPARISON:  [facility], XR, XR CHEST AP PORTABLE  (CPT=71045), 9/29/2019, 3:27 PM.  INDICATIONS:  vomiting- poss DKA  PATIENT STATED HISTORY: (As transcribed by Technologist)  Patient flew home from [facility] yesterday where Hospitalist

## 2020-03-23 NOTE — ED PROVIDER NOTES
Patient Seen in: [facility] Department In [place]      History   Patient presents with:  Vomiting    Stated Complaint: vomiting- poss DKA    HPI    15-year-old male.   Medical history of alcohol abuse currently in remission, history of chronic re fusion L5-S1   • CATARACT  10/30/16    right eye   • COLONOSCOPY N/A 11/14/2017    Performed by Humble Elizondo MD at [facility] ENDOSCOPY   • EGD  11/14/2017   • ESOPHAGOGASTRODUODENOSCOPY (EGD) N/A 11/14/2017    Performed by Humble Elizondo MD at [facility] E nasal congestion. Oropharynx is patent without evidence of erythema, exudates or deviation.   No stridor to auscultation  Lung: No distress, RR, no retraction, breath sounds are clear bilaterally  Cardio: Sinus tachycardia with heart rate 114, normal S1-S2 these tests on the individual orders.    VENOUS BLOOD GAS   RAINBOW DRAW LAVENDER   RAINBOW DRAW LIGHT GREEN   RAINBOW DRAW GOLD   RAINBOW DRAW BLUE   HOLD FOR COVID 19   RESPIRATORY PANEL FLU EXPANDED   CBC W/ DIFFERENTIAL         Xr Chest Ap Portable  (cp coma ([facility] [address]  (primary encounter diagnosis)  Acute renal insufficiency  Acute bronchitis, unspecified organism    Disposition:  Admit  3/23/2020  2:10 pm    Follow-up:  No follow-up provider specified.       Medications Prescribed:  Current Discharge [address]

## 2020-03-23 NOTE — RESPIRATORY THERAPY NOTE
i-STAT Testing  Site sofya  Time 13:36  Navjot's test result N/A  Clinical data 7.25/47/28/-7/21/43% on room air

## 2020-03-23 NOTE — PLAN OF CARE
Pt a/o x4. Insulin infusion in place. DKA order set obtained. VSS. Maintaining spo2 saturations on RA. BMP q4hr per endocrinology MD. R/o COVID-19 iso in place. ID MD consulted. Call back pending. Plan of care discussed. Will continue to monitor.

## 2020-03-24 LAB
ANION GAP SERPL CALC-SCNC: 4 MMOL/L (ref 0–18)
ATRIAL RATE: 93 BPM
BUN BLD-MCNC: 16 MG/DL (ref 7–18)
BUN/CREAT SERPL: 20.3 (ref 10–20)
CALCIUM BLD-MCNC: 8.2 MG/DL (ref 8.5–10.1)
CHLORIDE SERPL-SCNC: 101 MMOL/L (ref 98–112)
CO2 SERPL-SCNC: 29 MMOL/L (ref 21–32)
CREAT BLD-MCNC: 0.79 MG/DL (ref 0.7–1.3)
DEPRECATED RDW RBC AUTO: 39.8 FL (ref 35.1–46.3)
ERYTHROCYTE [DISTWIDTH] IN BLOOD BY AUTOMATED COUNT: 11.8 % (ref 11–15)
GLUCOSE BLD-MCNC: 148 MG/DL (ref 70–99)
GLUCOSE BLD-MCNC: 152 MG/DL (ref 70–99)
GLUCOSE BLD-MCNC: 160 MG/DL (ref 70–99)
GLUCOSE BLD-MCNC: 161 MG/DL (ref 70–99)
GLUCOSE BLD-MCNC: 166 MG/DL (ref 70–99)
GLUCOSE BLD-MCNC: 167 MG/DL (ref 70–99)
GLUCOSE BLD-MCNC: 170 MG/DL (ref 70–99)
GLUCOSE BLD-MCNC: 171 MG/DL (ref 70–99)
GLUCOSE BLD-MCNC: 175 MG/DL (ref 70–99)
GLUCOSE BLD-MCNC: 187 MG/DL (ref 70–99)
GLUCOSE BLD-MCNC: 202 MG/DL (ref 70–99)
GLUCOSE BLD-MCNC: 207 MG/DL (ref 70–99)
GLUCOSE BLD-MCNC: 217 MG/DL (ref 70–99)
GLUCOSE BLD-MCNC: 231 MG/DL (ref 70–99)
HCT VFR BLD AUTO: 40.9 % (ref 39–53)
HGB BLD-MCNC: 13.9 G/DL (ref 13–17.5)
INR BLD: 1.15 (ref 0.89–1.11)
MCH RBC QN AUTO: 31.1 PG (ref 26–34)
MCHC RBC AUTO-ENTMCNC: 34 G/DL (ref 31–37)
MCV RBC AUTO: 91.5 FL (ref 80–100)
OSMOLALITY SERPL CALC.SUM OF ELEC: 283 MOSM/KG (ref 275–295)
P AXIS: 54 DEGREES
P-R INTERVAL: 144 MS
PLATELET # BLD AUTO: 177 10(3)UL (ref 150–450)
POTASSIUM SERPL-SCNC: 3.4 MMOL/L (ref 3.5–5.1)
POTASSIUM SERPL-SCNC: 4.1 MMOL/L (ref 3.5–5.1)
PSA SERPL DL<=0.01 NG/ML-MCNC: 15.1 SECONDS (ref 12.4–14.6)
Q-T INTERVAL: 344 MS
QRS DURATION: 84 MS
QTC CALCULATION (BEZET): 427 MS
R AXIS: 56 DEGREES
RBC # BLD AUTO: 4.47 X10(6)UL (ref 4.3–5.7)
SARS-COV-2 RNA RESP QL NAA+PROBE: NOT DETECTED
SODIUM SERPL-SCNC: 134 MMOL/L (ref 136–145)
T AXIS: 50 DEGREES
VENTRICULAR RATE: 93 BPM
WBC # BLD AUTO: 5.1 X10(3) UL (ref 4–11)

## 2020-03-24 PROCEDURE — 80048 BASIC METABOLIC PNL TOTAL CA: CPT | Performed by: INTERNAL MEDICINE

## 2020-03-24 PROCEDURE — 84132 ASSAY OF SERUM POTASSIUM: CPT | Performed by: HOSPITALIST

## 2020-03-24 PROCEDURE — 85610 PROTHROMBIN TIME: CPT | Performed by: INTERNAL MEDICINE

## 2020-03-24 PROCEDURE — 62367 ANALYZE SPINE INFUS PUMP: CPT

## 2020-03-24 PROCEDURE — 85027 COMPLETE CBC AUTOMATED: CPT | Performed by: INTERNAL MEDICINE

## 2020-03-24 PROCEDURE — 82962 GLUCOSE BLOOD TEST: CPT

## 2020-03-24 RX ORDER — DEXTROAMPHETAMINE SACCHARATE, AMPHETAMINE ASPARTATE, DEXTROAMPHETAMINE SULFATE AND AMPHETAMINE SULFATE 1.25; 1.25; 1.25; 1.25 MG/1; MG/1; MG/1; MG/1
15 TABLET ORAL
Status: DISCONTINUED | OUTPATIENT
Start: 2020-03-24 | End: 2020-03-25

## 2020-03-24 RX ORDER — DIPHENHYDRAMINE HYDROCHLORIDE 50 MG/ML
12.5 INJECTION INTRAMUSCULAR; INTRAVENOUS EVERY 4 HOURS PRN
Status: DISCONTINUED | OUTPATIENT
Start: 2020-03-24 | End: 2020-03-25

## 2020-03-24 RX ORDER — IBUPROFEN 600 MG/1
600 TABLET ORAL EVERY 6 HOURS PRN
Status: DISCONTINUED | OUTPATIENT
Start: 2020-03-24 | End: 2020-03-25

## 2020-03-24 RX ORDER — ONDANSETRON 2 MG/ML
4 INJECTION INTRAMUSCULAR; INTRAVENOUS EVERY 6 HOURS PRN
Status: DISCONTINUED | OUTPATIENT
Start: 2020-03-24 | End: 2020-03-25

## 2020-03-24 RX ORDER — NALOXONE HYDROCHLORIDE 0.4 MG/ML
0.08 INJECTION, SOLUTION INTRAMUSCULAR; INTRAVENOUS; SUBCUTANEOUS
Status: DISCONTINUED | OUTPATIENT
Start: 2020-03-24 | End: 2020-03-25

## 2020-03-24 RX ORDER — POTASSIUM CHLORIDE 20 MEQ/1
40 TABLET, EXTENDED RELEASE ORAL EVERY 4 HOURS
Status: COMPLETED | OUTPATIENT
Start: 2020-03-24 | End: 2020-03-24

## 2020-03-24 RX ORDER — CLONAZEPAM 0.5 MG/1
0.5 TABLET ORAL 2 TIMES DAILY PRN
Status: DISCONTINUED | OUTPATIENT
Start: 2020-03-24 | End: 2020-03-25

## 2020-03-24 NOTE — PLAN OF CARE
Report received from previous RN. Pt A&O x 4. C/o back chronic pain and medicated as per orders. Pt on RA sating well and denies any SOB. Pt denies N/V/D. Accu q 1 following basic algorithm column 2. Pt NPO with sips of water with meds.   Basic q 4 with

## 2020-03-24 NOTE — PROGRESS NOTES
[facility] Hospitalist Progress Note     Mariann Gorman Patient Status:  Inpatient    1965 MRN [id number]   [facility] 4SW-A Attending Zhane Anderson MD   Hosp Day # 1 PCP Diego Ibanez MD     CC: follow up    SUBJECTIVE:  Telep • enoxaparin  40 mg Subcutaneous Daily   • benzonatate  200 mg Oral TID   • insulin (NOVOLIN R) bolus from bag  0.1 Units/kg Intravenous Once   • nicotine  1 patch Transdermal Nightly     Continuous Infusing Medication:  • morphINE PCA 1mg/ml     • morph

## 2020-03-24 NOTE — CM/SW NOTE
MSW left a message for the patient's next of kin Yahir Quill as listed on facesheet to complete discharge planning assessment and to obtain further information about the patient's recent contacts. Awaiting response.     Alyce Barksdale LCSW

## 2020-03-24 NOTE — PROGRESS NOTES
[facility] Pharmacy Note:  Pain Consult    Elio Moritz is a 49year old male started on Morphine PCA by Dr. Rochelle Correa. Pharmacy was consulted to review medication profile and to discontinue previously ordered narcotics and sedatives.     Medication profile w

## 2020-03-24 NOTE — PLAN OF CARE
Assumed care of pt around 0100. A+Ox4, neuro intact. Pt c/o back pain 8/10, PRN norco admin per orders. Afebrile. NSR-ST. 10 beat run of NSVT @0230, converted back to SR. HR 90-100s. Pulses palpable. Bilat breath sounds diminished, RA. NP cough.  NPO, sips

## 2020-03-24 NOTE — PROGRESS NOTES
[facility]  Report of Consultation    Su Rodrigues Patient Status:  Inpatient    1965 MRN [id number]   [facility] 4SW-A Attending Cherelle Gustafson MD   [address] Day # 1 PCP Quincy Cramer MD     Date of Admission:  3/23/202 • Insulin pump status     On Medtronic MiniMed pump   • Neuropathy     feet and toes   • Osteoarthritis    • Pneumonia due to organism    • PTSD (post-traumatic stress disorder)    • Rheumatoid arthritis(714.0)    • Sleep apnea     pt wears oral device C Pt             states allergy is \"slight\"    Medications:    Current Facility-Administered Medications:   •  Potassium Chloride ER (K-DUR M20) CR tab 40 mEq, 40 mEq, Oral, Q4H  •  insulin detemir (LEVEMIR) 100 UNIT/ML flextouch 42 Units, 42 Units, Subcut (NOVOLIN R) 100 Units in sodium chloride 0.9% 100 mL infusion, 2-52 Units/hr, Intravenous, Continuous  •  glucose (DEX4) oral liquid 15 g, 15 g, Oral, Q15 Min PRN **OR** Glucose-Vitamin C (DEX-4) chewable tab 4 tablet, 4 tablet, Oral, Q15 Min PRN **OR** de injury ([facility]     S/P insertion of intrathecal pump     Type 1 diabetes mellitus with diabetic polyneuropathy (HCC)     Hypogonadism in male     Ketonuria     Neuropathy due to secondary diabetes mellitus ([facility] [address]     Alcohol-induced chronic pancreatitis ([facility]

## 2020-03-24 NOTE — PLAN OF CARE
Pt a/o x4. Denies nausea/vomiting. C/o chronic low back 7-8 (0-10). Pain service consulted. Pain improved with initiation of Morphine PCA, see MAR. VSS. Droplet/contact isolation in place, COVID-19 results (-).  Insulin infusion discontinued per endocrinolo

## 2020-03-24 NOTE — PROGRESS NOTES
[facility] Patient Status:  Inpatient    1965 MRN [id number]   [facility] 4SW-A Attending Courtney Deleon MD   [address] Day # 1 PCP Juni Black MD     Critical Care Progress Note     Date of Admission: 3 (DEX4) oral liquid 15 g, 15 g, Oral, Q15 Min PRN **OR** Glucose-Vitamin C (DEX-4) chewable tab 4 tablet, 4 tablet, Oral, Q15 Min PRN **OR** dextrose 50 % injection 50 mL, 50 mL, Intravenous, Q15 Min PRN **OR** glucose (DEX4) oral liquid 30 g, 30 g, Oral, Q 03/24/2020    K 3.4 03/24/2020     03/24/2020    CO2 29.0 03/24/2020    BUN 16 03/24/2020    CREATSERUM 0.79 03/24/2020     03/24/2020    CA 8.2 03/24/2020    ALKPHO 93 03/23/2020    ALT 23 03/23/2020    AST 14 03/23/2020    BILT 0.6 03/23/202

## 2020-03-24 NOTE — CONSULTS
[facility]  Endocrinology Consultation    Nicole Rice Patient Status:  Inpatient    1965 MRN [id number]   [facility] 4SW-A Attending Cindy Keen MD   Hosp Day # 1 PCP Stephania Morel MD     Reason for Consultation: thrombosis    • Type 1 diabetes (HCC)     Dx at age 40   • Type 1 diabetes mellitus ([facility]    • Type 1 diabetes mellitus with diabetic polyneuropathy ([facility] 5/9/2018   • Visual impairment        Past Surgical History:   Procedure Laterality Date   • BACK SULEMA spray, Each Nare, Daily  •  Metoprolol Succinate ER (Toprol XL) 24 hr tab 50 mg, 50 mg, Oral, Daily Beta Blocker  •  mirtazapine (REMERON) tab 45 mg, 45 mg, Oral, Nightly  •  Pantoprazole Sodium (PROTONIX) EC tab 20 mg, 20 mg, Oral, BID AC  •  risperiDONE nausea, vomiting, or abdominal pain  : no new dysuria or hematuria  Endo: no new polyuria or polydipsia  MSK: no new muscle weakness  Neuro: no new tremors    PE    /89   Pulse 89   Temp 98.6 °F (37 °C) (Temporal)   Resp 18   Ht 74\"   Wt 189 lb 9. closed  - will transition to subcutaneous insulin as follows:  levemir 42 daily  novolog 1 unit for every 10g carbs plus correction 1:15>140 QID  - will continue to monitor and adjust as needed    2.  Cough  - resp flu panel negative  - COVID-19 PCR pending

## 2020-03-24 NOTE — CM/SW NOTE
03/24/20 1400   CM/SW Referral Data   Referral Source Physician   Reason for Referral Discharge planning   Informant Patient   Social History   Recreational Drug/Alcohol Use no   Major Changes Last 6 Months no   Domestic/Partner Violence no   Suicidal I

## 2020-03-24 NOTE — PAYOR COMM NOTE
--------------  ADMISSION REVIEW     Payor: Eduardo Foy #:  [id number]  Authorization Number:  [id number]     Admit date: 3/23/20  Admit time: 46       Admitting Physician: Nallely Sorensen MD  Attending Twila Power Current:/72   Pulse 95   Temp 97.7 °F (36.5 °C) (Oral)   Resp 16   Ht 188 cm (6' 2\")   Wt 90.7 kg   SpO2 98%   BMI 25.68 kg/m²          Physical Exam    Gen: Fatigued appearing, well groomed, alert and aware x 3  Neck: Supple, full range of mo All other components within normal limits   CBC WITH DIFFERENTIAL WITH PLATELET    Narrative: The following orders were created for panel order CBC WITH DIFFERENTIAL WITH PLATELET.   Procedure                               Abnormality         Status possibility of reflex corona testing    Chest x-ray benign    Glucose of 504, sodium 125, chloride 88, creatinine 1.52. Urinalysis demonstrates greater than 160 ketones and 500 glucose. Venous blood gas performed demonstrating a bicarb of 20.7, pH of 7. 2 Kaushik Alarcon MD (Physician)           [facility] Hospitalist H&P       CC: Patient presents with:  Vomiting       PCP: Andry Armendariz MD    History of Present Illness:  Mr. Quincy Cordova is a 50 yo male with PMH of DM type I, HTN, chronic pancreatitis, GERD, hours.    CXR: image personally reviewed no consolidation    Radiology: Ct Sinus (plm=[id number])    Result Date: 2/26/2020  DATE OF SERVICE: 02.26.2020 CT SINUS SURVEY CLINICAL INDICATION: Chronic sinus issues.  History of previous septoplasty and FESS COMPARIS IMPRESSION: Unremarkable portable chest radiograph.    Dictated by: Zuleika Mitchell MD on 3/23/2020 at 1:25 PM     Finalized by: Zuleika Mitchell MD on 3/23/2020 at 1:25 PM                 ASSESSMENT / PLAN:      Mr. Bairon Juan is a 48 yo male with PMH of (XL) (WELLBUTRIN XL) 150 MG 24 hr tab 150 mg     Date Action Dose Route User    3/24/2020 0849 Given 150 mg Oral Karen Zaheer, RN    3/23/2020 1902 Given 150 mg Oral Karen Simpers, RN      busPIRone HCl (BUSPAR) tab 20 mg     Date Action Dose Route User Date Action Dose Route User    3/24/2020 1027 Given 42 Units Subcutaneous (Left Lower Abdomen) Aida Julian RN      Insulin Regular Human (NOVOLIN R) 100 Units in sodium chloride 0.9% 100 mL infusion     Date Action Dose Route User    3/23/2020 1834 Given 20 mg Oral Ade Gallito, [facility]    3/23/2020 1901 Given 20 mg Oral Tony Foote RN      Potassium Chloride ER (K-DUR M20) CR tab 40 mEq     Date Action Dose Route User    3/24/2020 1117 Given 40 mEq Oral Tony Foote RN    3/24/2020 9777 Giv

## 2020-03-24 NOTE — CONSULTS
[address] [facility] Patient Status:  Inpatient    1965 MRN [id number]   [facility] 4SW-A Attending Kaushik Alarcon MD   Hosp Day # 0 ANA ROSA Richards • EYE CATARACT WITH INTRAOCULAR LENS Left 3/6/2017    Performed by Kevin Dorantes MD at [facility] Right 05/04/2018    Morphine 5.0 mg/cc R abdomen implant   • OTHER  2016    CERVICAL SURGERY   • OTHER SURGICAL HISTORY  10/12 Subcutaneous, Daily  •  acetaminophen (TYLENOL) tab 650 mg, 650 mg, Oral, Q6H PRN  •  ondansetron HCl (ZOFRAN) injection 4 mg, 4 mg, Intravenous, Q6H PRN  •  Metoclopramide HCl (REGLAN) injection 10 mg, 10 mg, Intravenous, Q8H PRN  •  benzonatate (TESSALON directed, Disp: 100 each, Rfl: 3  Testosterone cypionate 200 MG/ML Intramuscular Solution, INJECT 1.5  ML INTO THE MUSCLE EVERY 14 DAYS., Disp: 10 mL, Rfl: 1  risperiDONE 2 MG Oral Tab, TK 1 T PO QHS, Disp: , Rfl:   omeprazole 20 MG Oral Capsule Delayed Re tablet, Rfl: 0  Continuous Blood Gluc Sensor (FREESTYLE CAMERON 14 DAY SENSOR) Does not apply Misc, 1 each by Does not apply route daily.  Use 1 sensor every 14 days, Disp: 6 each, Rfl: 2  Continuous Blood Gluc  (FREESTYLE CAMERON 14 DAY READER) Does no bilaterally. No wheezes. No rhonchi. Cardiovascular: S1, S2.  Regular rate and rhythm. No murmurs. Abdomen: Soft, nontender, nondistended. Positive bowel sounds. Pain pump  Musculoskeletal: Full range of motion of all extremities. No swelling noted. ([facility] [address]     Acute renal insufficiency     Acute bronchitis, unspecified organism      ASSESSMENT/PLAN:  1.URI symptoms, improved, cxr clear, no fever  Was in contact with some children that were sick while staying in [place], they did not travel overseas  -

## 2020-03-25 VITALS
WEIGHT: 189.63 LBS | SYSTOLIC BLOOD PRESSURE: 118 MMHG | BODY MASS INDEX: 24.34 KG/M2 | TEMPERATURE: 97 F | OXYGEN SATURATION: 96 % | HEART RATE: 86 BPM | DIASTOLIC BLOOD PRESSURE: 80 MMHG | HEIGHT: 74 IN | RESPIRATION RATE: 19 BRPM

## 2020-03-25 LAB
ANION GAP SERPL CALC-SCNC: 2 MMOL/L (ref 0–18)
BASOPHILS # BLD AUTO: 0.1 X10(3) UL (ref 0–0.2)
BASOPHILS NFR BLD AUTO: 2.1 %
BUN BLD-MCNC: 10 MG/DL (ref 7–18)
BUN/CREAT SERPL: 13.2 (ref 10–20)
CALCIUM BLD-MCNC: 8.6 MG/DL (ref 8.5–10.1)
CHLORIDE SERPL-SCNC: 105 MMOL/L (ref 98–112)
CO2 SERPL-SCNC: 32 MMOL/L (ref 21–32)
CREAT BLD-MCNC: 0.76 MG/DL (ref 0.7–1.3)
DEPRECATED RDW RBC AUTO: 40.7 FL (ref 35.1–46.3)
EOSINOPHIL # BLD AUTO: 0.25 X10(3) UL (ref 0–0.7)
EOSINOPHIL NFR BLD AUTO: 5.4 %
ERYTHROCYTE [DISTWIDTH] IN BLOOD BY AUTOMATED COUNT: 12.1 % (ref 11–15)
GLUCOSE BLD-MCNC: 108 MG/DL (ref 70–99)
GLUCOSE BLD-MCNC: 151 MG/DL (ref 70–99)
GLUCOSE BLD-MCNC: 81 MG/DL (ref 70–99)
HCT VFR BLD AUTO: 42.1 % (ref 39–53)
HGB BLD-MCNC: 14.3 G/DL (ref 13–17.5)
IMM GRANULOCYTES # BLD AUTO: 0.01 X10(3) UL (ref 0–1)
IMM GRANULOCYTES NFR BLD: 0.2 %
LYMPHOCYTES # BLD AUTO: 1.92 X10(3) UL (ref 1–4)
LYMPHOCYTES NFR BLD AUTO: 41.1 %
MCH RBC QN AUTO: 31.3 PG (ref 26–34)
MCHC RBC AUTO-ENTMCNC: 34 G/DL (ref 31–37)
MCV RBC AUTO: 92.1 FL (ref 80–100)
MONOCYTES # BLD AUTO: 0.57 X10(3) UL (ref 0.1–1)
MONOCYTES NFR BLD AUTO: 12.2 %
NEUTROPHILS # BLD AUTO: 1.82 X10 (3) UL (ref 1.5–7.7)
NEUTROPHILS # BLD AUTO: 1.82 X10(3) UL (ref 1.5–7.7)
NEUTROPHILS NFR BLD AUTO: 39 %
OSMOLALITY SERPL CALC.SUM OF ELEC: 286 MOSM/KG (ref 275–295)
PLATELET # BLD AUTO: 191 10(3)UL (ref 150–450)
POTASSIUM SERPL-SCNC: 3.8 MMOL/L (ref 3.5–5.1)
RBC # BLD AUTO: 4.57 X10(6)UL (ref 4.3–5.7)
SODIUM SERPL-SCNC: 139 MMOL/L (ref 136–145)
WBC # BLD AUTO: 4.7 X10(3) UL (ref 4–11)

## 2020-03-25 PROCEDURE — 85025 COMPLETE CBC W/AUTO DIFF WBC: CPT | Performed by: NURSE PRACTITIONER

## 2020-03-25 PROCEDURE — 82962 GLUCOSE BLOOD TEST: CPT

## 2020-03-25 PROCEDURE — 80048 BASIC METABOLIC PNL TOTAL CA: CPT | Performed by: NURSE PRACTITIONER

## 2020-03-25 RX ORDER — POTASSIUM CHLORIDE 20 MEQ/1
40 TABLET, EXTENDED RELEASE ORAL ONCE
Status: COMPLETED | OUTPATIENT
Start: 2020-03-25 | End: 2020-03-25

## 2020-03-25 NOTE — PROGRESS NOTES
[facility] [address] Patient Status:  Inpatient    1965 MRN [id number]   [facility] 4SW-A Attending Shirley Wharton, *   Hosp Day # 2 PCP Mario Alberto Julien MD     Critical Care Progress Note     Date of Admiss Fluticasone Propionate (FLONASE) 50 MCG/ACT nasal spray 2 spray, 2 spray, Each Nare, Daily  •  Metoprolol Succinate ER (Toprol XL) 24 hr tab 50 mg, 50 mg, Oral, Daily Beta Blocker  •  mirtazapine (REMERON) tab 45 mg, 45 mg, Oral, Nightly  •  Pantoprazole S Exam:                          General: alert, cooperative, in NAD                          HEENT: oropharynx clear without erythema or exudates, moist mucous membranes                          Lungs: Clear to auscultation bilaterally, no wheezes or crackl

## 2020-03-25 NOTE — PROGRESS NOTES
Pain Service    49year old male with PMH DM1, chronic back pain, anxiety, depression, HTN, HL, and RA admitted with DKA, cough, chills, and hyperglycemia. His blood sugars are WNL now and he of off insulin drip.  He tested negative for COVID-19 and will b his PTM at home. If questions or concerns, please page [phone]. Plan discussed with/by: Pain Nurse - Gerda Francisco RN, and Anesthesia - Dr. Andrews Bailey    ----------------  Pt. Seen.  Agree with above  -Christianne

## 2020-03-25 NOTE — PROGRESS NOTES
Assumed care for this patient at 0730. Patient alert and oriented x4. Morphine PCA handed off with previous RN, patient states it is keeping his pain in a tolerable level. VSS. Plan for patient to discharge home.  Discharge instructions given to patient, al

## 2020-03-25 NOTE — DISCHARGE SUMMARY
General Medicine Discharge Summary     Patient ID:  Nakia Mohan  49year old  [id number]  11/16/1965    Admit date: 3/23/2020    Discharge date and time: 3/25/20    Attending Physician: Tigre Maddox, *     Primary Care Physician: Michell Clemente ENDOCRINOLOGY  IP CONSULT TO HOSPITALIST  IP CONSULT TO PULMONOLOGY  IP CONSULT TO INFECTIOUS DISEASE  IP CONSULT TO PAIN MANAGEMENT  IP CONSULT TO PHARMACY    Radiology:  Ct Sinus (qub=[id number])    Result Date: 2/26/2020  DATE OF SERVICE: 02.26.2020 CT SINUS silhouette and pulmonary vasculature are unremarkable. No consolidation, pleural effusion or pneumothorax. IMPRESSION: Unremarkable portable chest radiograph.    Dictated by: Pat Argueta MD on 3/23/2020 at 1:25 PM     Finalized by: Pat Argueta, (15 mg total) by mouth daily. lisinopril 5 MG Oral Tab  Take 1 tablet (5 mg total) by mouth daily.     !! CONTOUR NEXT TEST In Vitro Strip  TEST 10 TIMES DAILY    buPROPion HCl ER, XL, 300 MG Oral Tablet 24 Hr  TK 1 T PO QAM START  AFTER 2 WEEKS  ON 150M cream #480 grams. Apply 4 grams three to four times daily for treatment of pain [facility])    ! ! - Potential duplicate medications found. Please discuss with provider. Home Medication Changes:      Activity: activity as tolerated  Diet: as direc

## 2020-03-25 NOTE — PROGRESS NOTES
[facility]  Endocrinology Progress Note    Magda Rice Patient Status:  Inpatient    1965 MRN [id number]   [facility] 4SW-A Attending Mona Dutta, *   Hosp Day # 2 PCP Paula Brar MD     CC: Patient preshubert 10 03/25/2020     03/25/2020    K 3.8 03/25/2020     03/25/2020    CO2 32.0 03/25/2020    GLU 81 03/25/2020    CA 8.6 03/25/2020    PGLU 108 03/25/2020       Recent Labs     03/23/20  1252 03/23/20  1510 03/23/20  1630 03/23/20  1753 03/23/20 any questions or concerns.      Darling Hughes MD  Endocrinology, Diabetes, and Metabolism  [facility]

## 2020-03-25 NOTE — PLAN OF CARE
Report received from previous RN. Pt was tested negative for the COVID -19 and notified with Dr. Anna Youssef or results. Pt with tx orders to floor. Lungs clear and dim at the bases on RA pt denies any SOB. Cardiac monitor SR/ST. Abdomen soft and non tender.

## 2020-03-26 NOTE — PAYOR COMM NOTE
--------------  DISCHARGE REVIEW    Payor: Eduardo Foy #:  [id number]  Authorization Number:  [id number]     Admit date: 3/23/20  Admit time:  9734  Discharge Date: 3/25/2020  1:05 PM     Admitting Physician: Carlee Alvarez to the ED with DKA.      # DKA  # DM type I  - per endo   - off insulin gtt  - SQ insulin   - diet advanced  - s/p IVF     # Pseudohyponatremia  - improved w/ BG control and IVFs   - monitor BMP     # Cough/Chills-improved  - with recent travel and sick con clear. The temporomandibular joints appear grossly unremarkable. IMPRESSION: 1. Postsurgical changes compatible with bilateral maxillary antrostomies and partial ethmoidectomies. 2. Mild chronic right maxillary sinusitis.     Xr Chest Ap Portable  (cpt=7 Solution  INJECT 1.5  ML INTO THE MUSCLE EVERY 14 DAYS.     risperiDONE 2 MG Oral Tab  TK 1 T PO QHS    omeprazole 20 MG Oral Capsule Delayed Release  TAKE 1 CAPSULE(20 MG) BY MOUTH TWICE DAILY BEFORE MEALS    Pancrelipase, Lip-Prot-Amyl, (CREON) [address] Hr  Take 15 mg by mouth 2 (two) times daily. BusPIRone HCl 10 MG Oral Tab  Take 20 mg by mouth 3 (three) times daily. Insulin Pump Accessories (PUMP REMOTE ) Does not apply Misc      mirtazapine 45 MG Oral Tab  Take 45 mg by mouth nightly. COMMENTS

## 2020-05-08 PROBLEM — K86.0 ALCOHOL-INDUCED CHRONIC PANCREATITIS (HCC): Status: RESOLVED | Noted: 2019-09-02 | Resolved: 2020-05-08

## 2020-05-08 PROBLEM — E13.9 DIABETES MELLITUS DUE TO PANCREATIC INJURY (HCC): Status: RESOLVED | Noted: 2018-04-13 | Resolved: 2020-05-08

## 2020-05-08 PROBLEM — S36.209S DIABETES MELLITUS DUE TO PANCREATIC INJURY (HCC): Status: RESOLVED | Noted: 2018-04-13 | Resolved: 2020-05-08

## 2020-12-22 NOTE — CONSULTS
Pulmonary H&P/Consult       NAME: Mariann Gorman - ROOM: 203/269-Y - MRN: [id number] - Age: 49year old - :  1965    Date of Admission: 3/23/2020 12:21 PM  Admission Diagnosis: Acute renal insufficiency [N28.9]  Acute bronchitis, unspecified orga Visual impairment       Past Surgical History:   Procedure Laterality Date   • BACK SURGERY  2007    fusion L5-S1   • CATARACT  10/30/16    right eye   • COLONOSCOPY N/A 11/14/2017    Performed by Caitlin Coello MD at [address] [place] [address] ENDOSCOPY   • EGD  11/14/2017 Disp: 10 mL, Rfl: 1, Past Month at Unknown time  risperiDONE 2 MG Oral Tab, TK 1 T PO QHS, Disp: , Rfl: , 3/22/2020 at Unknown time  omeprazole 20 MG Oral Capsule Delayed Release, TAKE 1 CAPSULE(20 MG) BY MOUTH TWICE DAILY BEFORE MEALS, Disp: 180 capsule, each, Rfl: 3, 3/23/2020 at Unknown time  METOPROLOL SUCCINATE ER 50 MG Oral Tablet 24 Hr, TAKE 1 TABLET BY MOUTH DAILY. DO NOT TAKE IF SYSTOLIC BLOOD PRESSURE LESS THAN 100 MM.  PLEASE DO NOT TAKE IF HEART RATE IS LESS THAN 60 BPM, Disp: 5 tablet, Rfl: 0, 3 for treatment of pain (PAINNORM), Disp: 1 each, Rfl: 11, More than a month at Unknown time        Penicillin G Benzat*    SWELLING, OTHER (SEE COMMENTS)    Comment:Redness and swelling in the hands  Shellfish-Derived P*    RASH    Comment:Pt has had CTs wi  Service: Not Asked        Blood Transfusions: Not Asked        Caffeine Concern: Yes          4-6 cups daily        Occupational Exposure: Not Asked        Hobby Hazards: Not Asked        Sleep Concern: Not Asked        Stress Concern: Not Asked Release, TAKE 1 CAPSULE(20 MG) BY MOUTH TWICE DAILY BEFORE MEALS, Disp: 180 capsule, Rfl: 3  Pancrelipase, Lip-Prot-Amyl, (CREON) 72552-24904 units Oral Cap DR Particles, Take 1 capsule by mouth 3 (three) times daily with meals. , Disp: 270 capsule, Rfl: 3 not apply Device, 1 each by Does not apply route daily. , Disp: 1 Device, Rfl: 0  CALEB CONTOUR NEXT TEST In Vitro Strip, Test 7 times daily, Disp: 700 strip, Rfl: 3  Amphetamine-Dextroamphet ER 30 MG Oral Capsule SR 24 Hr, Take 15 mg by mouth 2 (two) times 1635  Gross per 24 hour   Intake 2000 ml   Output 800 ml   Net 1200 ml       /75   Pulse 100   Temp 97.9 °F (36.6 °C) (Temporal)   Resp (!) 9   Ht 6' 2\" (1.88 m)   Wt 200 lb (90.7 kg)   SpO2 94%   BMI 25.68 kg/m²     General Appearance:    Alert, co Other hyperlipidemia

## 2020-12-29 PROBLEM — R00.0 TACHYCARDIA: Status: ACTIVE | Noted: 2020-12-29

## 2021-01-04 PROBLEM — I70.0 AORTIC ATHEROSCLEROSIS (HCC): Status: ACTIVE | Noted: 2021-01-04

## 2021-02-26 ENCOUNTER — HOSPITAL ENCOUNTER (EMERGENCY)
Age: 56
Discharge: HOME | End: 2021-02-26
Attending: EMERGENCY MEDICINE
Payer: MEDICAID

## 2021-02-26 VITALS
HEIGHT: 74 IN | RESPIRATION RATE: 15 BRPM | SYSTOLIC BLOOD PRESSURE: 118 MMHG | OXYGEN SATURATION: 95 % | BODY MASS INDEX: 24.38 KG/M2 | DIASTOLIC BLOOD PRESSURE: 79 MMHG | TEMPERATURE: 98 F | HEART RATE: 93 BPM | WEIGHT: 190 LBS

## 2021-02-26 DIAGNOSIS — N28.9 ACUTE RENAL INSUFFICIENCY: Primary | ICD-10-CM

## 2021-02-26 DIAGNOSIS — R73.9 HYPERGLYCEMIA: ICD-10-CM

## 2021-02-26 DIAGNOSIS — R53.1 WEAKNESS: ICD-10-CM

## 2021-02-26 LAB
ALBUMIN SERPL-MCNC: 3.9 G/DL (ref 3.4–5)
ALBUMIN/GLOB SERPL: 1.1 {RATIO} (ref 1–2)
ALP LIVER SERPL-CCNC: 86 U/L
ALT SERPL-CCNC: 36 U/L
ANION GAP SERPL CALC-SCNC: 6 MMOL/L (ref 0–18)
AST SERPL-CCNC: 17 U/L (ref 15–37)
ATRIAL RATE: 112 BPM
BASOPHILS # BLD AUTO: 0.13 X10(3) UL (ref 0–0.2)
BASOPHILS NFR BLD AUTO: 2.1 %
BILIRUB SERPL-MCNC: 0.4 MG/DL (ref 0.1–2)
BILIRUB UR QL STRIP.AUTO: NEGATIVE
BUN BLD-MCNC: 27 MG/DL (ref 7–18)
BUN/CREAT SERPL: 19.1 (ref 10–20)
CALCIUM BLD-MCNC: 9.3 MG/DL (ref 8.5–10.1)
CHLORIDE SERPL-SCNC: 97 MMOL/L (ref 98–112)
CK SERPL-CCNC: 128 U/L
CLARITY UR REFRACT.AUTO: CLEAR
CO2 SERPL-SCNC: 30 MMOL/L (ref 21–32)
COLOR UR AUTO: YELLOW
CREAT BLD-MCNC: 1.41 MG/DL
DEPRECATED RDW RBC AUTO: 42.4 FL (ref 35.1–46.3)
EOSINOPHIL # BLD AUTO: 0.69 X10(3) UL (ref 0–0.7)
EOSINOPHIL NFR BLD AUTO: 10.9 %
ERYTHROCYTE [DISTWIDTH] IN BLOOD BY AUTOMATED COUNT: 12.9 % (ref 11–15)
GLOBULIN PLAS-MCNC: 3.4 G/DL (ref 2.8–4.4)
GLUCOSE BLD-MCNC: 378 MG/DL (ref 70–99)
GLUCOSE BLD-MCNC: 405 MG/DL (ref 70–99)
GLUCOSE UR STRIP.AUTO-MCNC: >=1000 MG/DL
HAV IGM SER QL: 1.9 MG/DL (ref 1.6–2.6)
HCT VFR BLD AUTO: 41.3 %
HGB BLD-MCNC: 14.1 G/DL
IMM GRANULOCYTES # BLD AUTO: 0.02 X10(3) UL (ref 0–1)
IMM GRANULOCYTES NFR BLD: 0.3 %
ISTAT BLOOD GAS BASE EXCESS: 1 MMOL/L (ref ?–30)
ISTAT BLOOD GAS HCO3: 26.7 MEQ/L (ref 22–26)
ISTAT BLOOD GAS O2 SATURATION: 86 % (ref 60–85)
ISTAT BLOOD GAS PCO2: 48.3 MMHG (ref 38–50)
ISTAT BLOOD GAS PH: 7.35 (ref 7.32–7.43)
ISTAT BLOOD GAS PO2: <70 MMHG (ref 35–40)
ISTAT BLOOD GAS TCO2: 28 MMOL/L (ref 22–32)
KETONES UR STRIP.AUTO-MCNC: NEGATIVE MG/DL
LACTATE SERPL-SCNC: 2.3 MMOL/L (ref 0.4–2)
LEUKOCYTE ESTERASE UR QL STRIP.AUTO: NEGATIVE
LIPASE SERPL-CCNC: 16 U/L (ref 73–393)
LYMPHOCYTES # BLD AUTO: 2.12 X10(3) UL (ref 1–4)
LYMPHOCYTES NFR BLD AUTO: 33.4 %
M PROTEIN MFR SERPL ELPH: 7.3 G/DL (ref 6.4–8.2)
MCH RBC QN AUTO: 30.9 PG (ref 26–34)
MCHC RBC AUTO-ENTMCNC: 34.1 G/DL (ref 31–37)
MCV RBC AUTO: 90.4 FL
MONOCYTES # BLD AUTO: 0.73 X10(3) UL (ref 0.1–1)
MONOCYTES NFR BLD AUTO: 11.5 %
NEUTROPHILS # BLD AUTO: 2.65 X10 (3) UL (ref 1.5–7.7)
NEUTROPHILS # BLD AUTO: 2.65 X10(3) UL (ref 1.5–7.7)
NEUTROPHILS NFR BLD AUTO: 41.8 %
NITRITE UR QL STRIP.AUTO: NEGATIVE
OSMOLALITY SERPL CALC.SUM OF ELEC: 298 MOSM/KG (ref 275–295)
P AXIS: 35 DEGREES
P-R INTERVAL: 146 MS
PH UR STRIP.AUTO: 6 [PH] (ref 5–8)
PHOSPHATE SERPL-MCNC: 4.4 MG/DL (ref 2.5–4.9)
PLATELET # BLD AUTO: 188 10(3)UL (ref 150–450)
POTASSIUM SERPL-SCNC: 4.4 MMOL/L (ref 3.5–5.1)
PROT UR STRIP.AUTO-MCNC: NEGATIVE MG/DL
Q-T INTERVAL: 332 MS
QRS DURATION: 90 MS
QTC CALCULATION (BEZET): 453 MS
R AXIS: 61 DEGREES
RBC # BLD AUTO: 4.57 X10(6)UL
RBC UR QL AUTO: NEGATIVE
SARS-COV-2 RNA RESP QL NAA+PROBE: NOT DETECTED
SODIUM SERPL-SCNC: 133 MMOL/L (ref 136–145)
SP GR UR STRIP.AUTO: 1.02 (ref 1–1.03)
T AXIS: 47 DEGREES
TROPONIN I SERPL-MCNC: <0.045 NG/ML (ref ?–0.04)
UROBILINOGEN UR STRIP.AUTO-MCNC: 0.2 MG/DL
VENTRICULAR RATE: 112 BPM
WBC # BLD AUTO: 6.3 X10(3) UL (ref 4–11)

## 2021-02-26 PROCEDURE — 82550 ASSAY OF CK (CPK): CPT | Performed by: EMERGENCY MEDICINE

## 2021-02-26 PROCEDURE — 87040 BLOOD CULTURE FOR BACTERIA: CPT | Performed by: EMERGENCY MEDICINE

## 2021-02-26 PROCEDURE — 83605 ASSAY OF LACTIC ACID: CPT | Performed by: EMERGENCY MEDICINE

## 2021-02-26 PROCEDURE — 80053 COMPREHEN METABOLIC PANEL: CPT | Performed by: EMERGENCY MEDICINE

## 2021-02-26 PROCEDURE — 82962 GLUCOSE BLOOD TEST: CPT

## 2021-02-26 PROCEDURE — 96360 HYDRATION IV INFUSION INIT: CPT

## 2021-02-26 PROCEDURE — 84100 ASSAY OF PHOSPHORUS: CPT | Performed by: EMERGENCY MEDICINE

## 2021-02-26 PROCEDURE — 83735 ASSAY OF MAGNESIUM: CPT | Performed by: EMERGENCY MEDICINE

## 2021-02-26 PROCEDURE — 81003 URINALYSIS AUTO W/O SCOPE: CPT | Performed by: EMERGENCY MEDICINE

## 2021-02-26 PROCEDURE — 36415 COLL VENOUS BLD VENIPUNCTURE: CPT

## 2021-02-26 PROCEDURE — 96361 HYDRATE IV INFUSION ADD-ON: CPT

## 2021-02-26 PROCEDURE — 84484 ASSAY OF TROPONIN QUANT: CPT | Performed by: EMERGENCY MEDICINE

## 2021-02-26 PROCEDURE — 93005 ELECTROCARDIOGRAM TRACING: CPT

## 2021-02-26 PROCEDURE — 99285 EMERGENCY DEPT VISIT HI MDM: CPT

## 2021-02-26 PROCEDURE — 83690 ASSAY OF LIPASE: CPT | Performed by: EMERGENCY MEDICINE

## 2021-02-26 PROCEDURE — 93010 ELECTROCARDIOGRAM REPORT: CPT

## 2021-02-26 PROCEDURE — 85025 COMPLETE CBC W/AUTO DIFF WBC: CPT | Performed by: EMERGENCY MEDICINE

## 2021-02-26 PROCEDURE — 82803 BLOOD GASES ANY COMBINATION: CPT

## 2021-02-26 RX ORDER — SODIUM CHLORIDE 9 MG/ML
INJECTION, SOLUTION INTRAVENOUS ONCE
Status: COMPLETED | OUTPATIENT
Start: 2021-02-26 | End: 2021-02-26

## 2021-02-26 NOTE — ED PROVIDER NOTES
Patient Seen in: [facility] Department In [place]      History   Patient presents with:  Dizziness  Fatigue    Stated Complaint: weakness/dizziness x 5 days.  no vomiting/diarrhea    HPI/Subjective:   HPI    Patient is a 81-year-old male with his ESOPHAGOGASTRODUODENOSCOPY (EGD) N/A 11/14/2017    Performed by Tatyana Diaz MD at [address] [place] [address]     • Traceystad Left 3/6/2017    Performed by Mario Lazcano MD at [address] [facility] Rate and Rhythm: Regular rhythm. Tachycardia present. Heart sounds: Normal heart sounds. Pulmonary:      Effort: Pulmonary effort is normal.      Breath sounds: Normal breath sounds.    Abdominal:      General: Bowel sounds are normal.      Palpatio ------                     CBC W/ DIFFERENTIAL[[id number]]                              Final result                 Please view results for these tests on the individual orders.    URINALYSIS WITH CULTURE REFLEX   VENOUS BLOOD GAS   LACTIC ACID 3 HR POST did discuss/offer admission for continued IV fluid rehydration and control his blood sugars but he states he is feeling better and would like to go home. I think this is reasonable if he is comfortable doing that and he states he definitely feels better.

## 2021-02-26 NOTE — ED INITIAL ASSESSMENT (HPI)
C/o feeling weak/fatigue/dizziness x 5 days. No appetite. Urinating a lot. H/o Type 1 diabetes. No cough or fever. No vomiting or diarrhea.

## 2021-02-26 NOTE — ED NOTES
BP is 77/54,  2 liters of normal saline infusing. MD aware. Pt is sleeping but sleeping but easily arousable.

## 2021-02-27 NOTE — ED NOTES
Pt verbalized of feeling so much better, ambulated to the bathroom with steady gait, no dizziness. MD aware. Pt wants to go home.

## 2021-03-09 PROBLEM — R00.0 TACHYCARDIA: Status: RESOLVED | Noted: 2020-12-29 | Resolved: 2021-03-09

## 2021-03-09 PROBLEM — N28.9 ACUTE RENAL INSUFFICIENCY: Status: RESOLVED | Noted: 2020-03-23 | Resolved: 2021-03-09

## 2021-03-09 PROBLEM — E87.2 METABOLIC ACIDOSIS: Status: RESOLVED | Noted: 2020-03-23 | Resolved: 2021-03-09

## 2021-03-09 PROBLEM — E87.20 METABOLIC ACIDOSIS: Status: RESOLVED | Noted: 2020-03-23 | Resolved: 2021-03-09

## 2021-03-11 ENCOUNTER — APPOINTMENT (OUTPATIENT)
Dept: GENERAL RADIOLOGY | Age: 56
End: 2021-03-11
Attending: EMERGENCY MEDICINE
Payer: MEDICAID

## 2021-03-11 ENCOUNTER — HOSPITAL ENCOUNTER (EMERGENCY)
Age: 56
Discharge: HOME | End: 2021-03-11
Attending: EMERGENCY MEDICINE
Payer: MEDICAID

## 2021-03-11 VITALS
WEIGHT: 193 LBS | SYSTOLIC BLOOD PRESSURE: 132 MMHG | DIASTOLIC BLOOD PRESSURE: 90 MMHG | OXYGEN SATURATION: 99 % | RESPIRATION RATE: 18 BRPM | HEART RATE: 102 BPM | HEIGHT: 74 IN | TEMPERATURE: 98 F | BODY MASS INDEX: 24.77 KG/M2

## 2021-03-11 DIAGNOSIS — R06.02 SHORTNESS OF BREATH: ICD-10-CM

## 2021-03-11 DIAGNOSIS — R00.0 TACHYCARDIA: ICD-10-CM

## 2021-03-11 DIAGNOSIS — R53.1 WEAKNESS GENERALIZED: Primary | ICD-10-CM

## 2021-03-11 LAB
ALBUMIN SERPL-MCNC: 4.4 G/DL (ref 3.4–5)
ALBUMIN/GLOB SERPL: 1.3 {RATIO} (ref 1–2)
ALP LIVER SERPL-CCNC: 95 U/L
ALT SERPL-CCNC: 34 U/L
ANION GAP SERPL CALC-SCNC: 3 MMOL/L (ref 0–18)
AST SERPL-CCNC: 20 U/L (ref 15–37)
BASOPHILS # BLD AUTO: 0.12 X10(3) UL (ref 0–0.2)
BASOPHILS NFR BLD AUTO: 1.8 %
BENZODIAZ UR QL SCN: NEGATIVE
BILIRUB SERPL-MCNC: 0.4 MG/DL (ref 0.1–2)
BILIRUB UR QL STRIP.AUTO: NEGATIVE
BUN BLD-MCNC: 21 MG/DL (ref 7–18)
BUN/CREAT SERPL: 17.8 (ref 10–20)
CALCIUM BLD-MCNC: 9.6 MG/DL (ref 8.5–10.1)
CANNABINOIDS UR QL SCN: NEGATIVE
CHLORIDE SERPL-SCNC: 101 MMOL/L (ref 98–112)
CLARITY UR REFRACT.AUTO: CLEAR
CO2 SERPL-SCNC: 32 MMOL/L (ref 21–32)
COCAINE UR QL: NEGATIVE
COLOR UR AUTO: YELLOW
CREAT BLD-MCNC: 1.18 MG/DL
CREAT UR-SCNC: 35.4 MG/DL
D-DIMER: 0.39 UG/ML FEU (ref ?–0.54)
DEPRECATED RDW RBC AUTO: 42.1 FL (ref 35.1–46.3)
EOSINOPHIL # BLD AUTO: 0.69 X10(3) UL (ref 0–0.7)
EOSINOPHIL NFR BLD AUTO: 10.2 %
ERYTHROCYTE [DISTWIDTH] IN BLOOD BY AUTOMATED COUNT: 12.8 % (ref 11–15)
ETHANOL SERPL-MCNC: <3 MG/DL (ref ?–3)
GLOBULIN PLAS-MCNC: 3.5 G/DL (ref 2.8–4.4)
GLUCOSE BLD-MCNC: 108 MG/DL (ref 70–99)
GLUCOSE BLD-MCNC: 94 MG/DL (ref 70–99)
GLUCOSE UR STRIP.AUTO-MCNC: 100 MG/DL
HCT VFR BLD AUTO: 38.3 %
HGB BLD-MCNC: 13.4 G/DL
IMM GRANULOCYTES # BLD AUTO: 0.01 X10(3) UL (ref 0–1)
IMM GRANULOCYTES NFR BLD: 0.1 %
KETONES UR STRIP.AUTO-MCNC: NEGATIVE MG/DL
LACTATE SERPL-SCNC: 1.2 MMOL/L (ref 0.4–2)
LEUKOCYTE ESTERASE UR QL STRIP.AUTO: NEGATIVE
LYMPHOCYTES # BLD AUTO: 2.7 X10(3) UL (ref 1–4)
LYMPHOCYTES NFR BLD AUTO: 39.8 %
M PROTEIN MFR SERPL ELPH: 7.9 G/DL (ref 6.4–8.2)
MCH RBC QN AUTO: 31.5 PG (ref 26–34)
MCHC RBC AUTO-ENTMCNC: 35 G/DL (ref 31–37)
MCV RBC AUTO: 89.9 FL
MONOCYTES # BLD AUTO: 0.85 X10(3) UL (ref 0.1–1)
MONOCYTES NFR BLD AUTO: 12.5 %
NEUTROPHILS # BLD AUTO: 2.41 X10 (3) UL (ref 1.5–7.7)
NEUTROPHILS # BLD AUTO: 2.41 X10(3) UL (ref 1.5–7.7)
NEUTROPHILS NFR BLD AUTO: 35.6 %
NITRITE UR QL STRIP.AUTO: NEGATIVE
OSMOLALITY SERPL CALC.SUM OF ELEC: 285 MOSM/KG (ref 275–295)
PH UR STRIP.AUTO: 5 [PH] (ref 5–8)
PLATELET # BLD AUTO: 217 10(3)UL (ref 150–450)
POTASSIUM SERPL-SCNC: 3.8 MMOL/L (ref 3.5–5.1)
PROT UR STRIP.AUTO-MCNC: NEGATIVE MG/DL
RBC # BLD AUTO: 4.26 X10(6)UL
RBC UR QL AUTO: NEGATIVE
SARS-COV-2 RNA RESP QL NAA+PROBE: NOT DETECTED
SODIUM SERPL-SCNC: 136 MMOL/L (ref 136–145)
SP GR UR STRIP.AUTO: 1.01 (ref 1–1.03)
T4 FREE SERPL-MCNC: 1.1 NG/DL (ref 0.8–1.7)
TROPONIN I SERPL-MCNC: <0.045 NG/ML (ref ?–0.04)
TSI SER-ACNC: 2.32 MIU/ML (ref 0.36–3.74)
UROBILINOGEN UR STRIP.AUTO-MCNC: 0.2 MG/DL
WBC # BLD AUTO: 6.8 X10(3) UL (ref 4–11)

## 2021-03-11 PROCEDURE — 93010 ELECTROCARDIOGRAM REPORT: CPT

## 2021-03-11 PROCEDURE — 71045 X-RAY EXAM CHEST 1 VIEW: CPT | Performed by: EMERGENCY MEDICINE

## 2021-03-11 PROCEDURE — 82077 ASSAY SPEC XCP UR&BREATH IA: CPT | Performed by: EMERGENCY MEDICINE

## 2021-03-11 PROCEDURE — 87040 BLOOD CULTURE FOR BACTERIA: CPT | Performed by: EMERGENCY MEDICINE

## 2021-03-11 PROCEDURE — 99285 EMERGENCY DEPT VISIT HI MDM: CPT

## 2021-03-11 PROCEDURE — 80053 COMPREHEN METABOLIC PANEL: CPT | Performed by: EMERGENCY MEDICINE

## 2021-03-11 PROCEDURE — 36415 COLL VENOUS BLD VENIPUNCTURE: CPT

## 2021-03-11 PROCEDURE — 80307 DRUG TEST PRSMV CHEM ANLYZR: CPT | Performed by: EMERGENCY MEDICINE

## 2021-03-11 PROCEDURE — 83605 ASSAY OF LACTIC ACID: CPT | Performed by: EMERGENCY MEDICINE

## 2021-03-11 PROCEDURE — 84484 ASSAY OF TROPONIN QUANT: CPT | Performed by: EMERGENCY MEDICINE

## 2021-03-11 PROCEDURE — 96360 HYDRATION IV INFUSION INIT: CPT

## 2021-03-11 PROCEDURE — 85379 FIBRIN DEGRADATION QUANT: CPT | Performed by: EMERGENCY MEDICINE

## 2021-03-11 PROCEDURE — 84443 ASSAY THYROID STIM HORMONE: CPT | Performed by: EMERGENCY MEDICINE

## 2021-03-11 PROCEDURE — 84439 ASSAY OF FREE THYROXINE: CPT | Performed by: EMERGENCY MEDICINE

## 2021-03-11 PROCEDURE — 81003 URINALYSIS AUTO W/O SCOPE: CPT | Performed by: EMERGENCY MEDICINE

## 2021-03-11 PROCEDURE — 82962 GLUCOSE BLOOD TEST: CPT

## 2021-03-11 PROCEDURE — 93005 ELECTROCARDIOGRAM TRACING: CPT

## 2021-03-11 PROCEDURE — 85025 COMPLETE CBC W/AUTO DIFF WBC: CPT | Performed by: EMERGENCY MEDICINE

## 2021-03-11 PROCEDURE — 96361 HYDRATE IV INFUSION ADD-ON: CPT

## 2021-03-11 RX ORDER — DOXYCYCLINE HYCLATE 100 MG/1
100 CAPSULE ORAL ONCE
Status: COMPLETED | OUTPATIENT
Start: 2021-03-11 | End: 2021-03-11

## 2021-03-11 RX ORDER — DOXYCYCLINE 100 MG/1
100 CAPSULE ORAL 2 TIMES DAILY
Qty: 14 CAPSULE | Refills: 0 | Status: SHIPPED | OUTPATIENT
Start: 2021-03-11 | End: 2021-03-18

## 2021-03-11 NOTE — ED INITIAL ASSESSMENT (HPI)
Pt c/o SOB, dizziness and tightness in shoulders and upper back. Pt was seen on 2/26 for same symptoms and was dx with dehydration. Pt states symptoms have not improved. Pt states standing and walking makes symptoms worse.  States when he walks his legs get

## 2021-03-12 LAB
ATRIAL RATE: 138 BPM
P AXIS: 35 DEGREES
P-R INTERVAL: 142 MS
Q-T INTERVAL: 264 MS
QRS DURATION: 84 MS
QTC CALCULATION (BEZET): 399 MS
R AXIS: 59 DEGREES
T AXIS: 23 DEGREES
VENTRICULAR RATE: 138 BPM

## 2021-03-12 NOTE — ED PROVIDER NOTES
Patient Seen in: THE [facility] Emergency Department In [place]      History   Patient presents with:  Difficulty Breathing  Dizziness    Stated Complaint: shortness of breath, dizziness, fatigue for 2 weeks.  was seen here 2 weeks ago *    HPI/Subjective:   H History:   Procedure Laterality Date   • BACK SURGERY  2007    fusion L5-S1   • CATARACT  10/30/16    right eye   • COLONOSCOPY N/A 11/14/2017    Performed by Andi Fernandez MD at [facility] ENDOSCOPY   • EGD  11/14/2017   • ESOPHAGOGASTRODUODENOSCOPY (EGD) N Neck: supple, no rigidity   Lungs: good air exchange and clear   Heart: Resting tachycardia. No murmur, gallop, rub. Abdomen: Soft and nontender. No abdominal masses. No peritoneal signs   Extremities: no edema, normal peripheral pulses   Neuro:  Aler LAVENDER   RAINBOW DRAW LIGHT GREEN   RAINBOW DRAW GOLD   BLOOD CULTURE   BLOOD CULTURE     EKG    Rate, intervals and axes as noted on EKG Report.   Rate: 138  Rhythm: Sinus Rhythm  Reading: No evidence of acute ischemia              XR CHEST AP PORTABLE diagnosis)  Tachycardia  Shortness of breath    Disposition:  Discharge  3/11/2021 10:06 pm    Follow-up:  Cristian Chaudhry MD  [address]  [phone]    Schedule an appointment as soon as possible for a visit

## 2021-11-21 ENCOUNTER — HOSPITAL ENCOUNTER (EMERGENCY)
Age: 56
Discharge: HOME | End: 2021-11-21
Attending: EMERGENCY MEDICINE
Payer: MEDICAID

## 2021-11-21 VITALS
WEIGHT: 205 LBS | HEIGHT: 74 IN | BODY MASS INDEX: 26.31 KG/M2 | SYSTOLIC BLOOD PRESSURE: 144 MMHG | DIASTOLIC BLOOD PRESSURE: 96 MMHG | HEART RATE: 91 BPM | TEMPERATURE: 98 F | RESPIRATION RATE: 16 BRPM | OXYGEN SATURATION: 98 %

## 2021-11-21 DIAGNOSIS — S61.012A LACERATION OF LEFT THUMB WITHOUT FOREIGN BODY WITHOUT DAMAGE TO NAIL, INITIAL ENCOUNTER: Primary | ICD-10-CM

## 2021-11-21 PROCEDURE — 12001 RPR S/N/AX/GEN/TRNK 2.5CM/<: CPT

## 2021-11-21 PROCEDURE — 99283 EMERGENCY DEPT VISIT LOW MDM: CPT

## 2021-11-21 RX ORDER — SULFAMETHOXAZOLE AND TRIMETHOPRIM 800; 160 MG/1; MG/1
1 TABLET ORAL 2 TIMES DAILY
Qty: 14 TABLET | Refills: 0 | Status: SHIPPED | OUTPATIENT
Start: 2021-11-21 | End: 2021-11-28

## 2021-11-22 NOTE — ED PROVIDER NOTES
Patient Seen in: THE [facility] Emergency Department In [place]      History   Patient presents with:  Laceration/Abrasion    Stated Complaint: Lac to left hand    Subjective:   HPI    Pleasant 71-year-old gentleman. Right-hand-dominant.   Just prior to arriv Posterior cervical laminectomy, Dr. Rachael Elizabeth History    Tobacco Use      Smoking status: Current Every Day Smoker        Packs/day: 0.50        Years: 5.00        Pack years: 2.5        Types: Cigarettes        Start date: 9/9/2012 with in 7 to 10 days.                            Disposition and Plan     Clinical Impression:  Laceration of left thumb without foreign body without damage to nail, initial encounter  (primary encounter diagnosis)     Disposition:  There is no disposition

## 2022-01-19 NOTE — CONSULTS
[facility]  Report of Consultation    Vijay Antoine Patient Status:  Observation    1965 MRN [id number]   [facility] 8NE-A Attending Orlando Nevarez MD   Hosp Day # 0 PCP Ricardo Milton     [facility] for [facility], [facility] Monitor: The problem is improving with lifestyle modifications.  Evaluation: Labs/tests ordered, see encounter summary.  Reviewed recent labs/tests with the patient.  Assessment/Treatment:  Continue current treatment/monitoring regimen. and Update treatment regimen per encounter summary   Relation Age of Onset   • Diabetes Father    • Heart Disorder Father    • Hypertension Mother       Smoking status: Current Every Day Smoker                                                   Packs/day: 0.50      Years: 5.00         Types: Cigarettes  Smoke FLUoxetine HCl 40 MG Oral Cap Take 40 mg by mouth nightly. Disp:  Rfl: 1   lisinopril 20 MG Oral Tab Take 20 mg by mouth nightly. Disp:  Rfl: 3   mirtazapine 45 MG Oral Tab Take 45 mg by mouth nightly.    Disp:  Rfl: 1   Insulin Lispro 100 UNIT/ML Subcu guarding or rebound. Extremities: Extremities do not demonstrate any evidence of peripheral edema. No cyanosis or clubbing of the digits is appreciated. Femoral, Dorsalis Pedis, and Posterior Tibialis  pulses are 2+ and equal in a symmetric fashion.

## 2022-03-30 PROBLEM — J20.9 ACUTE BRONCHITIS, UNSPECIFIED ORGANISM: Status: RESOLVED | Noted: 2020-03-23 | Resolved: 2022-03-30

## 2022-12-16 PROCEDURE — 93010 ELECTROCARDIOGRAM REPORT: CPT | Performed by: INTERNAL MEDICINE

## 2023-01-03 ENCOUNTER — APPOINTMENT (OUTPATIENT)
Dept: GENERAL RADIOLOGY | Age: 58
End: 2023-01-03
Attending: EMERGENCY MEDICINE
Payer: MEDICAID

## 2023-01-03 ENCOUNTER — HOSPITAL ENCOUNTER (EMERGENCY)
Age: 58
Discharge: HOME | End: 2023-01-03
Attending: EMERGENCY MEDICINE
Payer: MEDICAID

## 2023-01-03 VITALS
BODY MASS INDEX: 26.95 KG/M2 | TEMPERATURE: 99 F | WEIGHT: 210 LBS | SYSTOLIC BLOOD PRESSURE: 159 MMHG | DIASTOLIC BLOOD PRESSURE: 103 MMHG | HEIGHT: 74 IN | HEART RATE: 103 BPM | OXYGEN SATURATION: 97 % | RESPIRATION RATE: 16 BRPM

## 2023-01-03 DIAGNOSIS — R00.2 PALPITATIONS: ICD-10-CM

## 2023-01-03 DIAGNOSIS — E11.65 TYPE 2 DIABETES MELLITUS WITH HYPERGLYCEMIA, WITH LONG-TERM CURRENT USE OF INSULIN (HCC): Primary | ICD-10-CM

## 2023-01-03 DIAGNOSIS — Z79.4 TYPE 2 DIABETES MELLITUS WITH HYPERGLYCEMIA, WITH LONG-TERM CURRENT USE OF INSULIN (HCC): Primary | ICD-10-CM

## 2023-01-03 LAB
ALBUMIN SERPL-MCNC: 3.3 G/DL (ref 3.4–5)
ALBUMIN/GLOB SERPL: 0.8 {RATIO} (ref 1–2)
ALP LIVER SERPL-CCNC: 109 U/L
ALT SERPL-CCNC: 71 U/L
ANION GAP SERPL CALC-SCNC: 4 MMOL/L (ref 0–18)
APTT PPP: 31.3 SECONDS (ref 23.3–35.6)
AST SERPL-CCNC: 35 U/L (ref 15–37)
BASOPHILS # BLD AUTO: 0.12 X10(3) UL (ref 0–0.2)
BASOPHILS NFR BLD AUTO: 1.8 %
BILIRUB SERPL-MCNC: 0.2 MG/DL (ref 0.1–2)
BILIRUB UR QL STRIP.AUTO: NEGATIVE
BUN BLD-MCNC: 15 MG/DL (ref 7–18)
CALCIUM BLD-MCNC: 9.5 MG/DL (ref 8.5–10.1)
CHLORIDE SERPL-SCNC: 100 MMOL/L (ref 98–112)
CLARITY UR REFRACT.AUTO: CLEAR
CO2 SERPL-SCNC: 31 MMOL/L (ref 21–32)
COLOR UR AUTO: YELLOW
CREAT BLD-MCNC: 1.18 MG/DL
D DIMER PPP FEU-MCNC: 0.36 UG/ML FEU (ref ?–0.56)
EOSINOPHIL # BLD AUTO: 0.35 X10(3) UL (ref 0–0.7)
EOSINOPHIL NFR BLD AUTO: 5.2 %
ERYTHROCYTE [DISTWIDTH] IN BLOOD BY AUTOMATED COUNT: 14 %
GFR SERPLBLD BASED ON 1.73 SQ M-ARVRAT: 72 ML/MIN/1.73M2 (ref 60–?)
GLOBULIN PLAS-MCNC: 4.3 G/DL (ref 2.8–4.4)
GLUCOSE BLD-MCNC: 304 MG/DL (ref 70–99)
GLUCOSE UR STRIP.AUTO-MCNC: >=1000 MG/DL
HCT VFR BLD AUTO: 44.5 %
HGB BLD-MCNC: 14.7 G/DL
IMM GRANULOCYTES # BLD AUTO: 0.02 X10(3) UL (ref 0–1)
IMM GRANULOCYTES NFR BLD: 0.3 %
INR BLD: 0.92 (ref 0.85–1.16)
KETONES UR STRIP.AUTO-MCNC: NEGATIVE MG/DL
LACTATE SERPL-SCNC: 1.6 MMOL/L (ref 0.4–2)
LEUKOCYTE ESTERASE UR QL STRIP.AUTO: NEGATIVE
LYMPHOCYTES # BLD AUTO: 1.9 X10(3) UL (ref 1–4)
LYMPHOCYTES NFR BLD AUTO: 28.2 %
MCH RBC QN AUTO: 29 PG (ref 26–34)
MCHC RBC AUTO-ENTMCNC: 33 G/DL (ref 31–37)
MCV RBC AUTO: 87.8 FL
MONOCYTES # BLD AUTO: 0.67 X10(3) UL (ref 0.1–1)
MONOCYTES NFR BLD AUTO: 9.9 %
NEUTROPHILS # BLD AUTO: 3.68 X10 (3) UL (ref 1.5–7.7)
NEUTROPHILS # BLD AUTO: 3.68 X10(3) UL (ref 1.5–7.7)
NEUTROPHILS NFR BLD AUTO: 54.6 %
NITRITE UR QL STRIP.AUTO: NEGATIVE
OSMOLALITY SERPL CALC.SUM OF ELEC: 292 MOSM/KG (ref 275–295)
PH UR STRIP.AUTO: 5 [PH] (ref 5–8)
PLATELET # BLD AUTO: 328 10(3)UL (ref 150–450)
POTASSIUM SERPL-SCNC: 4.7 MMOL/L (ref 3.5–5.1)
PROT SERPL-MCNC: 7.6 G/DL (ref 6.4–8.2)
PROT UR STRIP.AUTO-MCNC: NEGATIVE MG/DL
PROTHROMBIN TIME: 12.4 SECONDS (ref 11.6–14.8)
RBC # BLD AUTO: 5.07 X10(6)UL
RBC UR QL AUTO: NEGATIVE
SARS-COV-2 RNA RESP QL NAA+PROBE: NOT DETECTED
SODIUM SERPL-SCNC: 135 MMOL/L (ref 136–145)
SP GR UR STRIP.AUTO: 1.02 (ref 1–1.03)
TROPONIN I HIGH SENSITIVITY: 12 NG/L
TSI SER-ACNC: 1.45 MIU/ML (ref 0.36–3.74)
UROBILINOGEN UR STRIP.AUTO-MCNC: 0.2 MG/DL
WBC # BLD AUTO: 6.7 X10(3) UL (ref 4–11)

## 2023-01-03 PROCEDURE — 85025 COMPLETE CBC W/AUTO DIFF WBC: CPT | Performed by: EMERGENCY MEDICINE

## 2023-01-03 PROCEDURE — 99285 EMERGENCY DEPT VISIT HI MDM: CPT

## 2023-01-03 PROCEDURE — 96360 HYDRATION IV INFUSION INIT: CPT

## 2023-01-03 PROCEDURE — 84484 ASSAY OF TROPONIN QUANT: CPT | Performed by: EMERGENCY MEDICINE

## 2023-01-03 PROCEDURE — 81003 URINALYSIS AUTO W/O SCOPE: CPT | Performed by: EMERGENCY MEDICINE

## 2023-01-03 PROCEDURE — 83605 ASSAY OF LACTIC ACID: CPT | Performed by: EMERGENCY MEDICINE

## 2023-01-03 PROCEDURE — 85379 FIBRIN DEGRADATION QUANT: CPT | Performed by: EMERGENCY MEDICINE

## 2023-01-03 PROCEDURE — 80053 COMPREHEN METABOLIC PANEL: CPT | Performed by: EMERGENCY MEDICINE

## 2023-01-03 PROCEDURE — 85730 THROMBOPLASTIN TIME PARTIAL: CPT | Performed by: EMERGENCY MEDICINE

## 2023-01-03 PROCEDURE — 93010 ELECTROCARDIOGRAM REPORT: CPT

## 2023-01-03 PROCEDURE — 96361 HYDRATE IV INFUSION ADD-ON: CPT

## 2023-01-03 PROCEDURE — 85610 PROTHROMBIN TIME: CPT | Performed by: EMERGENCY MEDICINE

## 2023-01-03 PROCEDURE — 71046 X-RAY EXAM CHEST 2 VIEWS: CPT | Performed by: EMERGENCY MEDICINE

## 2023-01-03 PROCEDURE — 84443 ASSAY THYROID STIM HORMONE: CPT | Performed by: EMERGENCY MEDICINE

## 2023-01-03 PROCEDURE — 93005 ELECTROCARDIOGRAM TRACING: CPT

## 2023-01-03 NOTE — DISCHARGE INSTRUCTIONS
Follow-up for for evaluation primary physician. Follow-up with cardiology. Return if chest pain, syncope, new or worse symptoms. Rest, plenty of fluids.   Manage her diabetes as recommended by your endocrinologist.

## 2023-01-03 NOTE — ED INITIAL ASSESSMENT (HPI)
C/o dizziness/light-headed, rapid heart rate, hypotension for few months. Stopped taking metoprolol and lisinopril  because of dizziness and hypotension.

## 2023-01-04 LAB
ATRIAL RATE: 128 BPM
P AXIS: 41 DEGREES
P-R INTERVAL: 146 MS
Q-T INTERVAL: 298 MS
QRS DURATION: 84 MS
QTC CALCULATION (BEZET): 435 MS
R AXIS: 66 DEGREES
T AXIS: 39 DEGREES
VENTRICULAR RATE: 128 BPM

## 2023-01-04 NOTE — ED QUICK NOTES
MD at bedside. Pt's hands are shaking, states he'd cold- temp 98. Denies of drinking alcohol. Denies of dizziness.

## 2023-03-05 NOTE — PROGRESS NOTES
HPI:    Patient ID: Cristhian Doe is a 49year old male.     HPI    Review of Systems         Current Outpatient Prescriptions:  CUSTOM MEDICATION CMPD Flurbiprofen 3.5%, Cyclobenzaprine 0.5%, Gabapentin 1%, Lidocaine 2.25%, Prilocaine 2.25%  Apply 4 gra Failed back surgical syndrome    No orders of the defined types were placed in this encounter.        Meds This Visit:  Signed Prescriptions Disp Refills    CUSTOM MEDICATION 1 each 11      Sig: CMPD Flurbiprofen 3.5%, Cyclobenzaprine 0.5%, Gabapentin 1%, L No respiratory distress. No stridor, Lungs sounds clear with good aeration bilaterally.

## 2024-02-23 NOTE — DIETARY NOTE
Nutrition Short Note:    Pt admitted in DKA with HgbA1c of 9.7. No education done at this time d/t pt in isolation/Covid-19 rule out. Will provide education prior to discharge if appropriate. RD available as needed.      Jordyn Otoole MS, RD, LDN tablet by mouth twice a day      rosuvastatin (CRESTOR) 40 MG tablet take 1/2 tablet by mouth at bedtime for 1 week then INCREASE to 1 tablet at bedtime             Past History     Past Medical History:  Past Medical History:   Diagnosis Date    Hypertension     Musculoskeletal disorder     Psychiatric disorder     anxiety       Past Surgical History:  No past surgical history on file.    Family History:  Family History   Problem Relation Age of Onset    Stroke Neg Hx     Cancer Neg Hx     Diabetes Neg Hx     Hypertension Neg Hx     Heart Disease Neg Hx        Social History:  Social History     Tobacco Use    Smoking status: Every Day     Current packs/day: 1.00     Types: Cigarettes    Smokeless tobacco: Never   Substance Use Topics    Alcohol use: Yes     Alcohol/week: 5.0 standard drinks of alcohol    Drug use: Never       Allergies:  No Known Allergies      Review of Systems   Review of Systems   Constitutional:  Negative for appetite change, fatigue and fever.   Respiratory:  Negative for cough, chest tightness and shortness of breath.    Cardiovascular:  Negative for chest pain, palpitations and leg swelling.   Gastrointestinal:  Negative for abdominal pain, diarrhea, nausea and vomiting.   Genitourinary:  Negative for difficulty urinating, dysuria, flank pain and frequency.   Skin:  Negative for rash.   Neurological:  Negative for dizziness, facial asymmetry, weakness, light-headedness, numbness and headaches.   Hematological:  Negative for adenopathy. Does not bruise/bleed easily.   Psychiatric/Behavioral:  Negative for agitation, behavioral problems and confusion.          Physical Exam   Physical Exam  Vitals and nursing note reviewed.   Constitutional:       General: He is not in acute distress.     Appearance: Normal appearance. He is not ill-appearing, toxic-appearing or diaphoretic.   HENT:      Head: Normocephalic and atraumatic.   Cardiovascular:      Rate and Rhythm: Normal rate and regular

## (undated) DEVICE — CATARACT PATIENT CARE KIT

## (undated) DEVICE — SOL H2O 1000ML BTL

## (undated) DEVICE — STERILE POLYISOPRENE POWDER-FREE SURGICAL GLOVES: Brand: PROTEXIS

## (undated) DEVICE — BLADE ADVANCUT CORNEAL 2.4MM

## (undated) DEVICE — FORCEP RADIAL JAW 4

## (undated) DEVICE — SINGLE USE MEDICAL DEVICE FOR OPHTHALMIC SURGERY: Brand: SIL. COATED I/A 45 MIL 12/B

## (undated) DEVICE — ACTIVE FMS W/ INTREPID* ULTRA SLEEVES, 0.9MM 30° ABS* INTREPID* BALANCED TIP: Brand: ALCON

## (undated) DEVICE — Device: Brand: DEFENDO AIR/WATER/SUCTION AND BIOPSY VALVE

## (undated) DEVICE — BSS BAG CENTURION

## (undated) DEVICE — ENDOSCOPY PACK UPPER: Brand: MEDLINE INDUSTRIES, INC.

## (undated) DEVICE — ENDOSCOPY PACK - LOWER: Brand: MEDLINE INDUSTRIES, INC.

## (undated) DEVICE — EYE PACK: Brand: MEDLINE INDUSTRIES, INC.

## (undated) DEVICE — NEEDLE CYSTOTOME W/25G CANNULA

## (undated) DEVICE — PREP BETADINE SOL 5% EYE

## (undated) DEVICE — CLEARCUT® SIDEPORT KNIFE DUAL BEVEL 1.0MM ANGLED: Brand: CLEARCUT®

## (undated) DEVICE — 3M(TM) TEGADERM(TM) TRANSPARENT FILM DRESSING FRAME STYLE 9505W: Brand: 3M™ TEGADERM™

## (undated) DEVICE — UNFOLDER PLATINUM 1 SERIES CRTRDG 30/BOX: Brand: UNFOLDER PLATINUM 1 SERIES

## (undated) NOTE — IP AVS SNAPSHOT
[facility] [place]  [address] [place], [address] ~ [phone]                Discharge Summary   [facility]16/2017    [facility]           Admission Information        Provider Department    1/16/2017 Guevara Bloom MD  5nw-A         T Take 200 mg by mouth 2 (two) times daily. ClonazePAM 0.5 MG Tabs   Last time this was given:  0.5 mg on 1/17/2017  2:40 PM   Commonly known as:  KLONOPIN        Take 0.5 mg by mouth 2 (two) times daily as needed for Anxiety. Discharge References/Attachments     DIABETES, TYPE 1, UNDERSTANDING (ENGLISH)    DIABETES, TYPE 1: [address] (ENGLISH)      Follow-up Information     Follow up with [facility] In 1 week.     Specialty:  Emergency Medicine    Why:  Endocrinology requirements for your authorization, please wait 5-7 days and then you can contact your physician's office. At that time, you will be provided with any authorization numbers or be assured that none are required. You can then schedule your appointment.  Fail 1.0  (01/16/17)  7.12 (H) (01/16/17)  1.95 (01/16/17)  0.75 (H) (01/16/17)  0.05 (01/16/17)  0.10    (10/24/16)  75.3 (10/24/16)  16.8 (10/24/16)  5.6 (10/24/16)  0.7 (10/24/16)  1.3  (10/24/16)  8.17 (H) (10/24/16)  1.82 (10/24/16)  0.61 (H) (10/24/16)  0 Sign up for [employer], your secure online medical record. [employer] will allow you to access patient instructions from your recent visit,  view other health information, and more. To sign up or find more information, go to [email or web address] and cl Use: Lower cholesterol, protect your heart   Most common side effects: Dizziness, constipation, abnormal liver function   What to report to your healthcare team:  Dizziness, muscle aches, constipation           Blood Producing Medications     folic acid ( Use:  Treat conditions such as seizures, headaches, depression, anxiety and other neurologic conditions   Most common side effects:  Dizziness, drowsiness, headache, nausea/vomiting, somnolence   What to report to your healthcare team: Dizziness, Somno

## (undated) NOTE — MR AVS SNAPSHOT
[address] [place] [address] [address]  [phone]               Thank you for choosing us for your health care visit with [facility], PT. We are glad to serve you and happy to provide you with this summary of your visit. ATORVASTATIN CALCIUM OR   Take 40 mg by mouth daily. ClonazePAM 0.5 MG Tabs   Take 0.5 mg by mouth 2 (two) times daily as needed for Anxiety.    Commonly known as:  KLONOPIN           Cyclobenzaprine HCl 10 MG Tabs   Take 1 tablet by mouth nightl Now link in the [employer] Enter your [employer] Activation Code exactly as it appears below along with your Zip Code and Date of Birth to complete the sign-up process. If you do not sign up before the expiration date, you must request a new code.     Yue Muro

## (undated) NOTE — MR AVS SNAPSHOT
[facility], [address] [address]               Thank you for choosing us for your health care visit with Juan Green.   We are glad to serve you and happy to provide you with this DDD (degenerative disc disease), cervical    -  Primary    Cervical radiculopathy        History of cervical spinal surgery        Neural foraminal stenosis of cervical spine          Instructions and Information about Your Health      Refill policies: their insurance carrier directly to determine coverage. If test is done without insurance authorization, patient may be responsible for the entire amount billed.       Precertification and Prior Authorizations  If your physician has recommended that you ha Take 600 mg by mouth 3 (three) times daily. Commonly known as:  NEURONTIN           HYDROcodone-acetaminophen  MG Tabs   Take 1 tablet by mouth every 4 (four) hours as needed for Pain.    Commonly known as:  NORCO           Insulin Lispro 100 UNIT/M Your blood pressure indicates you may be at-risk for Hypertension. Please consider the following Lifestyle Modifications. Also, please return for a follow-up Blood Pressure Check in 1 month.      Lifestyle Modification Recommendations:    Modification R

## (undated) NOTE — ED AVS SNAPSHOT
THE [facility] Emergency Department in [address] [facility]    Phone:  [phone]    Fax:  Irish Rice   MRN: [id number]    Department:  THE [facility] Emergency Department in [place]   Date of Visit [facility]   [phone]            Jun 27, 2017 10:30 AM   PT VISIT BY THERAPIST with Ricardo Rivers, KAITLYNN   [facility] in [facility] ([address])    [address] [place] [address] Insurance plans vary and the physician(s) referred by the ER may not be covered by your plan. Please contact your insurance company to determine coverage for follow-up care and referrals.     [place] [facility]  Main [phone]  Pediatric If the emergency physician has read X-rays, these will be re-interpreted by a radiologist.  If there is a significant change in your reading, you will be contacted. Please make sure we have your correct phone number before you leave.  After you leave, you s and ask to get set up for an insurance coverage that is in-network with [facility].         Imaging Results         XR CHEST AP PORTABLE  (CPT=71010) (Final result) Result time:  05/29/17 09:14:45    Final result    Impression:    CONCLUSION:  N

## (undated) NOTE — ED AVS SNAPSHOT
Isatu Dominguez   MRN: [id number]    Department:  [facility] in [place]   Date of Visit:  9/29/2019           Disclosure     Insurance plans vary and the physician(s) referred by the ER may not be covered by your plan.  Please conta tell this physician (or your personal doctor if your instructions are to return to your personal doctor) about any new or lasting problems. The primary care or specialist physician will see patients referred from the [facility] Emergency Department.  Coleen Parish

## (undated) NOTE — MR AVS SNAPSHOT
[facility], [address], [address] [place], [place] [id number]               Thank you for choosing us for your health care visit with AILEEN Brantley.   We are glad to serve you and happy to provide you with this and have them send an electronic request or submit request through the “request refill” option in your [employer] account. ? Refills are not addressed on weekends; covering physicians do not authorize routine medications on weekends.   ? No narcotics or contr the procedure/test has been pre-certified. You are strongly encouraged to contact your insurance carrier to verify that your procedure/test has been approved and is a COVERED benefit.   Although the [facility] staff does its due diligence, the insurance carrier g Commonly known as:  MOBIC           Metoprolol Succinate ER 50 MG Tb24   Take 50 mg by mouth nightly. Commonly known as: Toprol XL           MIRTAZAPINE OR   Take 45 mg by mouth nightly. omeprazole 20 MG Cpdr   Take 20 mg by mouth daily.  Indic

## (undated) NOTE — MR AVS SNAPSHOT
[facility], [place] [id number]               Thank you for choosing us for your health care visit with Capri Marie PA-C.   We are glad to serve you and happy to provide you with this scott Failed back surgical syndrome          Instructions and Information about Your Health      Refill policies:    ? Allow 2-3 business days for refills; controlled substances may take longer. ?  Contact your pharmacy at least 5 days prior to running out of m amount billed. Precertification and Prior Authorizations  If your physician has recommended that you have a procedure or additional testing performed.   [facility]) will contact your insurance carrier to obtain pre-certification Take 300 mg by mouth 3 (three) times daily. Commonly known as:  NEURONTIN           * gabapentin 600 MG Tabs   Take 600 mg by mouth 3 (three) times daily.    Commonly known as:  NEURONTIN           HYDROcodone-acetaminophen  MG Tabs   Take 1 tablet Your unique Yext Access Code: [email or web address]  Expires: 7/11/2017  9:12 AM    If you have questions, you can call [phone] to talk to our [facility] Staff. Remember, Yext is NOT to be used for urgent needs. For medical emergencies, dial 911.

## (undated) NOTE — MR AVS SNAPSHOT
[address] [facility]  [address] [facility], [address] [place], [place] [address]               Thank you for choosing us for your health care visit with AILEEN Browne.   We are glad to serve you and happy to provide you with this Your physician has ordered a radiology test that may require authorization from your insurance company.   Your physician or the clinic staff will work with your insurance company to obtain this authorization for your ordered radiology test.    To schedule a ? Patient must present photo ID at time of . Scheduling Tests    If your physician has ordered radiology tests such as MRI or CT scans, do not schedule the test until this office has notified you that the test has been approved by your insurer. What changed:  how much to take   Commonly known as:  PROZAC           folic acid 1 MG Tabs   Take 1 tablet by mouth daily. Commonly known as:  FOLVITE           * gabapentin 300 MG Caps   Take 300 mg by mouth 3 (three) times daily.    Commonly known as: not sign up before the expiration date, you must request a new code. Your unique [employer] App Access Code: [email or web address]  Expires: 5/5/2017  5:14 PM    If you have questions, you can call [phone] to talk to our [facility] Staff.  Remember, Mitch i

## (undated) NOTE — IP AVS SNAPSHOT
[facility] [place]  [address] [place], [address] ~ [phone]                Discharge Summary   3/6/2017    [facility]           Admission Information        Provider Department    3/6/2017 Filomena Mckinnon MD  Nessa Crawford / Sixto Vides Take 20 mg by mouth daily. [    ]    [    ]    [    ]    [    ]       Meloxicam 15 MG Tabs   Commonly known as:  MOBIC        Take 1 tablet (15 mg total) by mouth daily.     Baljinder Iglesias     [    ]    [    ]    [    ]    [    ]       Metoprolol Suc • Avoid aspirin and alcohol today. • For pain: Take Tylenol Extra Strength as you normally would. • Pain medication my constipate you. If you do not have a bowel movement in 48  hours, please call your doctor.     When to Call:  • Call your doctor if you e 6.8 (01/17/17)  1.8 -- (01/17/17)  2.22 (01/17/17)  2.39 (01/17/17)  0.50 (01/17/17)  0.38 (H) (01/17/17)  0.10    (01/16/17)  71.2 (01/16/17)  19.5 (01/16/17)  7.5 (01/16/17)  0.5 (01/16/17)  1.0  (01/16/17)  7.12 (H) (01/16/17)  1.95 (01/16/17)  0.75 (H) Sign up for [employer], your secure online medical record. [employer] will allow you to access patient instructions from your recent visit,  view other health information, and more. To sign up or find more information, go to [email or web address] and cl

## (undated) NOTE — MR AVS SNAPSHOT
[address] [address]               Thank you for choosing us for your health care visit with Tru Landa MD.  We are glad to serve you and happy to provide you with this scott Take 1 tablet by mouth every 4 (four) hours as needed for Pain. Commonly known as:  NORCO           Insulin Lispro 100 UNIT/ML Soln   Inject 1-68 Units into the skin daily.  For insulin pump  [employer] Basal rate: 8 units continuous IC - 1:15 CF -  1:50 Enjoy your food, but eat less. Fully enjoy your food when eating. Don’t eat while distracted and slow down. Avoid over sized portions. Don’t eat while when you’re bored.      EAT THESE FOODS MORE OFTEN: EAT THESE FOODS LESS OFTEN:   Make half your pl

## (undated) NOTE — ED AVS SNAPSHOT
Jerome Sibley   MRN: [id number]    Department:  [address] [facility] in [place]   Date of Visit:  8/19/2017           Disclosure     Insurance plans vary and the physician(s) referred by the ER may not be covered by your plan.  Please conta If you have been prescribed any medication(s), please fill your prescription right away and begin taking the medication(s) as directed    If the emergency physician has read X-rays, these will be re-interpreted by a radiologist.  If there is a significant

## (undated) NOTE — ED AVS SNAPSHOT
Eleuterio Chavez   MRN: [id number]    Department:  [facility] Emergency Department in [place]   Date of Visit:  1/27/2018           Disclosure     Insurance plans vary and the physician(s) referred by the ER may not be covered by your plan.  Please conta tell this physician (or your personal doctor if your instructions are to return to your personal doctor) about any new or lasting problems. The primary care or specialist physician will see patients referred from the [facility] Emergency Department.  Severo Pastures

## (undated) NOTE — ED AVS SNAPSHOT
[address] [facility] in [address] [facility]    Phone:  [phone]    Fax:  [facility] [place] [address]   MRN: [id number]    Department:  [address] Emergency Department in [place]   Date of Visit IF THERE IS ANY CHANGE OR WORSENING OF YOUR CONDITION, CALL YOUR PRIMARY CARE PHYSICIAN AT ONCE OR RETURN IMMEDIATELY TO THE EMERGENCY DEPARTMENT.     If you have been prescribed any medication(s), please fill your prescription right away and begin taking t

## (undated) NOTE — ED AVS SNAPSHOT
Angélica Fulton   MRN: [id number]    Department:  [facility] Emergency Department in [place]   Date of Visit:  2/9/2018           Disclosure     Insurance plans vary and the physician(s) referred by the ER may not be covered by your plan.  Please contac tell this physician (or your personal doctor if your instructions are to return to your personal doctor) about any new or lasting problems. The primary care or specialist physician will see patients referred from the [facility] Emergency Department.  Janelle Hutchison

## (undated) NOTE — MR AVS SNAPSHOT
[address]  [phone]               Thank you for choosing us for your health care visit with Markus Ernst PT. We are glad to serve you and happy to provide you with this summary of your visit. Take 40 mg by mouth daily. BUPROPION HBR ER OR   Take 30 mg by mouth 2 (two) times daily. ClonazePAM 0.5 MG Tabs   Take 0.5 mg by mouth 2 (two) times daily as needed for Anxiety.    Commonly known as:  KLONOPIN           Cyclobenzaprine information, go to [email or web address] and click on the Sign Up Now link in the [employer] box. Enter your [employer] Activation Code exactly as it appears below along with your Zip Code and Date of Birth to complete the sign-up process.  If you do

## (undated) NOTE — ED AVS SNAPSHOT
More Enciso   MRN: [id number]    Department:  [facility] Emergency Department in [place]   Date of Visit:  1/14/2018           Disclosure     Insurance plans vary and the physician(s) referred by the ER may not be covered by your plan.  Please conta tell this physician (or your personal doctor if your instructions are to return to your personal doctor) about any new or lasting problems. The primary care or specialist physician will see patients referred from the [facility] Emergency Department.  Dominique Quiroz

## (undated) NOTE — LETTER
11/15/2017          [address] [place] [id number]    Dear Jeff Angulo,       Here are the biopsy/pathology findings from your recent EGD (Upper  Endoscopy):    Esophageal infection with candida (fungal)    Follow-up information:    A

## (undated) NOTE — ED AVS SNAPSHOT
Gabe Hallmangabriella   MRN: [id number]    Department:  [facility] Emergency Department in [place]   Date of Visit:  8/28/2018           Disclosure     Insurance plans vary and the physician(s) referred by the ER may not be covered by your plan.  Please conta tell this physician (or your personal doctor if your instructions are to return to your personal doctor) about any new or lasting problems. The primary care or specialist physician will see patients referred from the [facility] Emergency Department.  Harsh Dan

## (undated) NOTE — MR AVS SNAPSHOT
[address] [address] [address]  [phone]               Thank you for choosing us for your health care visit with [facility], PT. We are glad to serve you and happy to provide you with this summary of your visit. This list is accurate as of: 6/13/17  5:24 PM.  Always use your most recent med list.                ATORVASTATIN CALCIUM OR   Take 40 mg by mouth daily. ClonazePAM 0.5 MG Tabs   Take 0.5 mg by mouth 2 (two) times daily as needed for Anxiety.    C information, go to [email or web address] and click on the Sign Up Now link in the [employer] box. Enter your [employer] Activation Code exactly as it appears below along with your Zip Code and Date of Birth to complete the sign-up process.  If you do